# Patient Record
Sex: FEMALE | Race: BLACK OR AFRICAN AMERICAN | NOT HISPANIC OR LATINO | Employment: OTHER | URBAN - METROPOLITAN AREA
[De-identification: names, ages, dates, MRNs, and addresses within clinical notes are randomized per-mention and may not be internally consistent; named-entity substitution may affect disease eponyms.]

---

## 2017-03-22 ENCOUNTER — TELEPHONE (OUTPATIENT)
Dept: FAMILY MEDICINE | Facility: CLINIC | Age: 62
End: 2017-03-22

## 2017-03-22 ENCOUNTER — OFFICE VISIT (OUTPATIENT)
Dept: FAMILY MEDICINE | Facility: CLINIC | Age: 62
End: 2017-03-22
Payer: COMMERCIAL

## 2017-03-22 VITALS
TEMPERATURE: 97.7 F | SYSTOLIC BLOOD PRESSURE: 157 MMHG | WEIGHT: 209 LBS | HEART RATE: 90 BPM | DIASTOLIC BLOOD PRESSURE: 82 MMHG | HEIGHT: 60 IN | BODY MASS INDEX: 41.03 KG/M2

## 2017-03-22 DIAGNOSIS — Z12.11 SCREEN FOR COLON CANCER: ICD-10-CM

## 2017-03-22 DIAGNOSIS — Z12.31 ENCOUNTER FOR SCREENING MAMMOGRAM FOR BREAST CANCER: ICD-10-CM

## 2017-03-22 DIAGNOSIS — Z13.220 SCREENING CHOLESTEROL LEVEL: ICD-10-CM

## 2017-03-22 DIAGNOSIS — I10 HYPERTENSION GOAL BP (BLOOD PRESSURE) < 140/90: ICD-10-CM

## 2017-03-22 DIAGNOSIS — Z11.59 NEED FOR HEPATITIS C SCREENING TEST: ICD-10-CM

## 2017-03-22 DIAGNOSIS — Z23 NEED FOR DIPHTHERIA-TETANUS-PERTUSSIS (TDAP) VACCINE, ADULT/ADOLESCENT: ICD-10-CM

## 2017-03-22 DIAGNOSIS — Z00.00 ROUTINE GENERAL MEDICAL EXAMINATION AT A HEALTH CARE FACILITY: Primary | ICD-10-CM

## 2017-03-22 DIAGNOSIS — E66.01 MORBID OBESITY, UNSPECIFIED OBESITY TYPE (H): ICD-10-CM

## 2017-03-22 DIAGNOSIS — Z12.4 SCREENING FOR MALIGNANT NEOPLASM OF CERVIX: ICD-10-CM

## 2017-03-22 LAB
ANION GAP SERPL CALCULATED.3IONS-SCNC: 10 MMOL/L (ref 3–14)
BUN SERPL-MCNC: 13 MG/DL (ref 7–30)
CALCIUM SERPL-MCNC: 9.9 MG/DL (ref 8.5–10.1)
CHLORIDE SERPL-SCNC: 96 MMOL/L (ref 94–109)
CHOLEST SERPL-MCNC: 232 MG/DL
CO2 SERPL-SCNC: 33 MMOL/L (ref 20–32)
CREAT SERPL-MCNC: 0.83 MG/DL (ref 0.52–1.04)
GFR SERPL CREATININE-BSD FRML MDRD: 70 ML/MIN/1.7M2
GLUCOSE SERPL-MCNC: 104 MG/DL (ref 70–99)
HDLC SERPL-MCNC: 75 MG/DL
LDLC SERPL CALC-MCNC: 136 MG/DL
NONHDLC SERPL-MCNC: 157 MG/DL
POTASSIUM SERPL-SCNC: 3.3 MMOL/L (ref 3.4–5.3)
SODIUM SERPL-SCNC: 139 MMOL/L (ref 133–144)
TRIGL SERPL-MCNC: 105 MG/DL

## 2017-03-22 PROCEDURE — 36415 COLL VENOUS BLD VENIPUNCTURE: CPT | Performed by: FAMILY MEDICINE

## 2017-03-22 PROCEDURE — G0145 SCR C/V CYTO,THINLAYER,RESCR: HCPCS | Performed by: FAMILY MEDICINE

## 2017-03-22 PROCEDURE — 87624 HPV HI-RISK TYP POOLED RSLT: CPT | Performed by: FAMILY MEDICINE

## 2017-03-22 PROCEDURE — 99386 PREV VISIT NEW AGE 40-64: CPT | Mod: 25 | Performed by: FAMILY MEDICINE

## 2017-03-22 PROCEDURE — 90715 TDAP VACCINE 7 YRS/> IM: CPT | Performed by: FAMILY MEDICINE

## 2017-03-22 PROCEDURE — 80048 BASIC METABOLIC PNL TOTAL CA: CPT | Performed by: FAMILY MEDICINE

## 2017-03-22 PROCEDURE — 80061 LIPID PANEL: CPT | Performed by: FAMILY MEDICINE

## 2017-03-22 PROCEDURE — 90471 IMMUNIZATION ADMIN: CPT | Performed by: FAMILY MEDICINE

## 2017-03-22 NOTE — NURSING NOTE
Screening Questionnaire for Adult Immunization    Are you sick today?   No   Do you have allergies to medications, food, a vaccine component or latex?   No   Have you ever had a serious reaction after receiving a vaccination?   No   Do you have a long-term health problem with heart disease, lung disease, asthma, kidney disease, metabolic disease (e.g. diabetes), anemia, or other blood disorder?   No   Do you have cancer, leukemia, HIV/AIDS, or any other immune system problem?   No   In the past 3 months, have you taken medications that affect  your immune system, such as prednisone, other steroids, or anticancer drugs; drugs for the treatment of rheumatoid arthritis, Crohn s disease, or psoriasis; or have you had radiation treatments?   No   Have you had a seizure, or a brain or other nervous system problem?   No   During the past year, have you received a transfusion of blood or blood     products, or been given immune (gamma) globulin or antiviral drug?   No   For women: Are you pregnant or is there a chance you could become        pregnant during the next month?   No   Have you received any vaccinations in the past 4 weeks?   No     Immunization questionnaire answers were all negative.      MNVFC doesn't apply on this patient           Screening performed by Monika Beach on 3/22/2017 at 2:08 PM.

## 2017-03-22 NOTE — MR AVS SNAPSHOT
After Visit Summary   3/22/2017    Melissa Coronado    MRN: [id number]           Patient Information     Date Of Birth          1955        Visit Information        Provider Department      3/22/2017 9:20 AM Yodit Pickering MD [facility]        Today's Diagnoses     Routine general medical examination at a health care facility    -  1    Morbid obesity, unspecified obesity type (H)        Hypertension goal BP (blood pressure) < 140/90        Screening cholesterol level        Need for hepatitis C screening test        Encounter for screening mammogram for breast cancer        Screen for colon cancer        Screening for malignant neoplasm of cervix        Need for diphtheria-tetanus-pertussis (Tdap) vaccine, adult/adolescent          Care Instructions      Preventive Health Recommendations  Female Ages 50 - 64    Yearly exam: See your health care provider every year in order to  o Review health changes.   o Discuss preventive care.    o Review your medicines if your doctor has prescribed any.      Get a Pap test every three years (unless you have an abnormal result and your provider advises testing more often).    If you get Pap tests with HPV test, you only need to test every 5 years, unless you have an abnormal result.     You do not need a Pap test if your uterus was removed (hysterectomy) and you have not had cancer.    You should be tested each year for STDs (sexually transmitted diseases) if you're at risk.     Have a mammogram every 1 to 2 years.    Have a colonoscopy at age 50, or have a yearly FIT test (stool test). These exams screen for colon cancer.      Have a cholesterol test every 5 years, or more often if advised.    Have a diabetes test (fasting glucose) every three years. If you are at risk for diabetes, you should have this test more often.     If you are at risk for osteoporosis (brittle bone disease), think about having a bone density scan (DEXA).    Shots: Get a flu  shot each year. Get a tetanus shot every 10 years.    Nutrition:     Eat at least 5 servings of fruits and vegetables each day.    Eat whole-grain bread, whole-wheat pasta and brown rice instead of white grains and rice.    Talk to your provider about Calcium and Vitamin D.     Lifestyle    Exercise at least 150 minutes a week (30 minutes a day, 5 days a week). This will help you control your weight and prevent disease.    Limit alcohol to one drink per day.    No smoking.     Wear sunscreen to prevent skin cancer.     See your dentist every six months for an exam and cleaning.    See your eye doctor every 1 to 2 years.          Follow-ups after your visit        Your next 10 appointments already scheduled     Mar 30, 2017  3:20 PM CDT   Office Visit with Yodit Pickering MD   [facility] ([facility]    [address] [facility] [address] [phone]           Bring a current list of meds and any records pertaining to this visit.  For Physicals, please bring immunization records and any forms needing to be filled out.  Please arrive 10 minutes early to complete paperwork.              Future tests that were ordered for you today     Open Future Orders        Priority Expected Expires Ordered    Fecal colorectal cancer screen (FIT) Routine 4/12/2017 6/14/2017 3/22/2017    MA Screening Digital Bilateral Routine  3/22/2018 3/22/2017    **Hepatitis C Screen Reflex to RNA FUTURE anytime Routine 3/22/2017 3/22/2018 3/22/2017            Who to contact     If you have questions or need follow up information about today's clinic visit or your schedule please contact [facility] directly at [phone].  Normal or non-critical lab and imaging results will be communicated to you by MyChart, letter or phone within 4 business days after the clinic has received the results. If you do not hear from us within 7 days, please contact the clinic through MyChart or phone. If you have a  "critical or abnormal lab result, we will notify you by phone as soon as possible.  Submit refill requests through [facility] or call your pharmacy and they will forward the refill request to us. Please allow 3 business days for your refill to be completed.          Additional Information About Your Visit        [employer] Information     [facility] lets you send messages to your doctor, view your test results, renew your prescriptions, schedule appointments and more. To sign up, go to [email or web address] . Click on \"Log in\" on the left side of the screen, which will take you to the Welcome page. Then click on \"Sign up Now\" on the right side of the page.     You will be asked to enter the access code listed below, as well as some personal information. Please follow the directions to create your username and password.     Your access code is: [email or web address]  Expires: 2017  7:50 AM     Your access code will  in 90 days. If you need help or a new code, please call your [facility] clinic or [phone].        Care EveryWhere ID     This is your Care EveryWhere ID. This could be used by other organizations to access your [facility] medical records  [id number]        Your Vitals Were     Pulse Temperature Height BMI (Body Mass Index)          90 97.7  F (36.5  C) (Oral) 5' (1.524 m) 40.82 kg/m2         Blood Pressure from Last 3 Encounters:   17 157/82   16 174/85    Weight from Last 3 Encounters:   17 209 lb (94.8 kg)   16 196 lb (88.9 kg)              We Performed the Following     Basic metabolic panel     HPV High Risk Types DNA Cervical     Lipid panel reflex to direct LDL     Pap imaged thin layer screen with HPV - recommended age 30 - 65 years (select HPV order below)     TDAP (ADACEL AGES 11-64)        [facility] Care [facility] Office Phone #    [phone]       No address on file        Thank you!     Thank you for choosing [facility]  for your care. " Our goal is always to provide you with excellent care. Hearing back from our patients is one way we can continue to improve our services. Please take a few minutes to complete the written survey that you may receive in the mail after your visit with us. Thank you!             Your Updated Medication List - Protect others around you: Learn how to safely use, store and throw away your medicines at www.disposemymeds.org.          This list is accurate as of: 3/22/17  3:27 PM.  Always use your most recent med list.                   Brand Name Dispense Instructions for use    HYDROCHLOROTHIAZIDE PO          POTASSIUM CHLORIDE PO          TYLENOL EXTRA STRENGTH PO      Take 500 mg by mouth

## 2017-03-22 NOTE — LETTER
March 30, 2017    Melissa Ivonne  [address]   [place] [address]    Dear Melissa,  We are happy to inform you that your PAP smear result from 3/22/17 is normal.  We are now able to do a follow up test on PAP smears. The DNA test is for HPV (Human Papilloma Virus). Cervical cancer is closely linked with certain types of HPV. Your result showed no evidence of high risk HPV.  Therefore we recommend you return in 5 years for your next pap smear and HPV test.  You will still need to return to the clinic every year for an annual exam and other preventive tests.  Please contact the clinic at [phone] with any questions.  Sincerely,    Yodit Moralez MD/jacky

## 2017-03-22 NOTE — PROGRESS NOTES
SUBJECTIVE:     CC: Melissa Coronado is an 62 year old woman who presents for preventive health visit.     Healthy Habits:    Do you get at least three servings of calcium containing foods daily (dairy, green leafy vegetables, etc.)? No     Amount of exercise or daily activities, outside of work: 0 day(s) per week    Problems taking medications regularly No    Medication side effects: No    Have you had an eye exam in the past two years? yes    Do you see a dentist twice per year? no    Do you have sleep apnea, excessive snoring or daytime drowsiness?no    [place] Originally [place] [facility].  No concerns today except back pain.   She made fu appt to address back pain        Today's PHQ-2 Score: No flowsheet data found.    Abuse: Current or Past(Physical, Sexual or Emotional)- No  Do you feel safe in your environment - Yes    Social History   Substance Use Topics     Smoking status: Never Smoker     Smokeless tobacco: Not on file     Alcohol use Not on file     The patient does not drink >3 drinks per day nor >7 drinks per week.    No results for input(s): CHOL, HDL, LDL, TRIG, CHOLHDLRATIO, NHDL in the last 38647 hours.    Reviewed orders with patient.  Reviewed health maintenance and updated orders accordingly - Yes    Mammo Decision Support:  Patient over age 50, mutual decision to screen reflected in health maintenance.    Pertinent mammograms are reviewed under the imaging tab.  History of abnormal Pap smear: NO - age 30-65 PAP every 5 years with negative HPV co-testing recommended    Reviewed and updated as needed this visit by clinical staff  Tobacco  Allergies  Med Hx  Surg Hx  Fam Hx  Soc Hx        Reviewed and updated as needed this visit by Provider            ROS:  C: NEGATIVE for fever, chills, change in weight  I: NEGATIVE for worrisome rashes, moles or lesions  E: NEGATIVE for vision changes or irritation  ENT: NEGATIVE for ear, mouth and throat problems  R: NEGATIVE for  significant cough or SOB  B: NEGATIVE for masses, tenderness or discharge  CV: NEGATIVE for chest pain, palpitations or peripheral edema  GI: NEGATIVE for nausea, abdominal pain, heartburn, or change in bowel habits  : NEGATIVE for unusual urinary or vaginal symptoms. No vaginal bleeding.  M: NEGATIVE for significant arthralgias or myalgia  N: NEGATIVE for weakness, dizziness or paresthesias  P: NEGATIVE for changes in mood or affect     Problem list, Medication list, Allergies, and Medical/Social/Surgical histories reviewed in [facility] and updated as appropriate.  OBJECTIVE:     /82  Pulse 90  Temp 97.7  F (36.5  C) (Oral)  Ht 5' (1.524 m)  Wt 209 lb (94.8 kg)  BMI 40.82 kg/m2  EXAM:  GENERAL APPEARANCE: healthy, alert and no distress  EYES: Eyes grossly normal to inspection, PERRL and conjunctivae and sclerae normal  HENT: ear canals and TM's normal, nose and mouth without ulcers or lesions, oropharynx clear and oral mucous membranes moist  NECK: no adenopathy, no asymmetry, masses, or scars and thyroid normal to palpation  RESP: lungs clear to auscultation - no rales, rhonchi or wheezes  BREAST: normal without masses, tenderness or nipple discharge and no palpable axillary masses or adenopathy  CV: regular rate and rhythm, normal S1 S2, no S3 or S4, no murmur, click or rub, no peripheral edema and peripheral pulses strong  ABDOMEN: soft, nontender, no hepatosplenomegaly, no masses and bowel sounds normal   (female): normal female external genitalia, normal urethral meatus, vaginal mucosal atrophy noted, normal cervix, adnexae, and uterus without masses or abnormal discharge  MS: no musculoskeletal defects are noted and gait is age appropriate without ataxia  SKIN: no suspicious lesions or rashes  NEURO: Normal strength and tone, sensory exam grossly normal, mentation intact and speech normal  PSYCH: mentation appears normal and affect normal/bright    ASSESSMENT/PLAN:     1. Routine general medical  examination at a health care facility      2. Hypertension goal BP (blood pressure) < 140/90  Did not take her meds this morning, has fu appt. Will recheck at that time  - Basic metabolic panel    3. Screening cholesterol level    - Lipid panel reflex to direct LDL    4. Need for hepatitis C screening test    - **Hepatitis C Screen Reflex to RNA FUTURE anytime; Future    5. Encounter for screening mammogram for breast cancer    - MA Screening Digital Bilateral; Future    6. Screen for colon cancer    - Fecal colorectal cancer screen (FIT); Future    7. Screening for malignant neoplasm of cervix    - Pap imaged thin layer screen with HPV - recommended age 30 - 65 years (select HPV order below)  - HPV High Risk Types DNA Cervical    8. Need for diphtheria-tetanus-pertussis (Tdap) vaccine, adult/adolescent    - TDAP (ADACEL AGES 11-64)    COUNSELING:   Reviewed preventive health counseling, as reflected in patient instructions       Regular exercise       Healthy diet/nutrition       Vision screening       Osteoporosis Prevention/Bone Health         reports that she has never smoked. She does not have any smokeless tobacco history on file.    Estimated body mass index is 40.82 kg/(m^2) as calculated from the following:    Height as of this encounter: 5' (1.524 m).    Weight as of this encounter: 209 lb (94.8 kg).   Weight management plan: Discussed healthy diet and exercise guidelines and patient will follow up in 12 months in clinic to re-evaluate.    Counseling Resources:  ATP IV Guidelines  Pooled Cohorts Equation Calculator  Breast Cancer Risk Calculator  FRAX Risk Assessment  ICSI Preventive Guidelines  Dietary Guidelines for Americans, 2010  USDA's MyPlate  ASA Prophylaxis  Lung CA Screening    Yodit Moralez MD  [facility]

## 2017-03-22 NOTE — NURSING NOTE
Chief Complaint   Patient presents with     Physical       Initial /82  Pulse 90  Temp 97.7  F (36.5  C) (Oral)  Ht 5' (1.524 m)  Wt 209 lb (94.8 kg)  BMI 40.82 kg/m2 Estimated body mass index is 40.82 kg/(m^2) as calculated from the following:    Height as of this encounter: 5' (1.524 m).    Weight as of this encounter: 209 lb (94.8 kg).  Medication Reconciliation: complete     Monika Beach MA

## 2017-03-24 LAB
COPATH REPORT: NORMAL
PAP: NORMAL

## 2017-03-28 LAB
FINAL DIAGNOSIS: NORMAL
HPV HR 12 DNA CVX QL NAA+PROBE: NEGATIVE
HPV16 DNA SPEC QL NAA+PROBE: NEGATIVE
HPV18 DNA SPEC QL NAA+PROBE: NEGATIVE
SPECIMEN DESCRIPTION: NORMAL

## 2017-03-30 DIAGNOSIS — Z11.59 NEED FOR HEPATITIS C SCREENING TEST: ICD-10-CM

## 2017-03-30 DIAGNOSIS — Z12.11 SCREEN FOR COLON CANCER: ICD-10-CM

## 2017-03-30 PROCEDURE — 82274 ASSAY TEST FOR BLOOD FECAL: CPT | Performed by: FAMILY MEDICINE

## 2017-03-30 PROCEDURE — 86803 HEPATITIS C AB TEST: CPT | Performed by: FAMILY MEDICINE

## 2017-03-30 PROCEDURE — 36415 COLL VENOUS BLD VENIPUNCTURE: CPT | Performed by: FAMILY MEDICINE

## 2017-03-30 NOTE — LETTER
[facility]  [address] [facility] [address] [phone]        April 3, 2017    Melissa Coronado  [address]  [place] [address]            Dear Melissa,    Your screening test for colon cancer is negative. Repeat this test yearly. Below is a copy of the results.  It was a pleasure to see you at your last appointment.    If you have any questions or concerns, please call myself or my nurse at [phone].    Sincerely,    Yodit Moralez MD    Results for orders placed or performed in visit on 03/30/17   **Hepatitis C Screen Reflex to RNA FUTURE anytime   Result Value Ref Range    Hepatitis C Antibody  NR     Nonreactive   Assay performance characteristics have not been established for newborns,   infants, and children     Fecal colorectal cancer screen (FIT)   Result Value Ref Range    Occult Blood Scn FIT Negative NEG

## 2017-03-30 NOTE — LETTER
[facility]  [address] [facility] [address]  [phone]        March 31, 2017    Melissa Coronado  [address]  [place] [address]            Dear Melissa,    Your screening test for hepatitis C is negative.     Yodit Moralez MD/naty    Results for orders placed or performed in visit on 03/30/17   **Hepatitis C Screen Reflex to RNA FUTURE anytime   Result Value Ref Range    Hepatitis C Antibody  NR     Nonreactive   Assay performance characteristics have not been established for newborns,   infants, and children

## 2017-03-31 LAB
HCV AB SERPL QL IA: NORMAL
HEMOCCULT STL QL IA: NEGATIVE

## 2017-04-13 ENCOUNTER — RADIANT APPOINTMENT (OUTPATIENT)
Dept: MAMMOGRAPHY | Facility: CLINIC | Age: 62
End: 2017-04-13
Attending: FAMILY MEDICINE
Payer: COMMERCIAL

## 2017-04-13 ENCOUNTER — OFFICE VISIT (OUTPATIENT)
Dept: FAMILY MEDICINE | Facility: CLINIC | Age: 62
End: 2017-04-13
Payer: COMMERCIAL

## 2017-04-13 ENCOUNTER — RADIANT APPOINTMENT (OUTPATIENT)
Dept: GENERAL RADIOLOGY | Facility: CLINIC | Age: 62
End: 2017-04-13
Attending: FAMILY MEDICINE
Payer: COMMERCIAL

## 2017-04-13 VITALS
BODY MASS INDEX: 41.01 KG/M2 | WEIGHT: 210 LBS | TEMPERATURE: 98 F | OXYGEN SATURATION: 97 % | HEART RATE: 84 BPM | RESPIRATION RATE: 15 BRPM | SYSTOLIC BLOOD PRESSURE: 147 MMHG | DIASTOLIC BLOOD PRESSURE: 63 MMHG

## 2017-04-13 DIAGNOSIS — M25.562 CHRONIC PAIN OF BOTH KNEES: ICD-10-CM

## 2017-04-13 DIAGNOSIS — G89.29 CHRONIC PAIN OF BOTH KNEES: ICD-10-CM

## 2017-04-13 DIAGNOSIS — M54.50 MIDLINE LOW BACK PAIN WITHOUT SCIATICA, UNSPECIFIED CHRONICITY: Primary | ICD-10-CM

## 2017-04-13 DIAGNOSIS — Z12.31 ENCOUNTER FOR SCREENING MAMMOGRAM FOR BREAST CANCER: ICD-10-CM

## 2017-04-13 DIAGNOSIS — M25.561 CHRONIC PAIN OF BOTH KNEES: ICD-10-CM

## 2017-04-13 PROCEDURE — 99214 OFFICE O/P EST MOD 30 MIN: CPT | Performed by: FAMILY MEDICINE

## 2017-04-13 PROCEDURE — G0202 SCR MAMMO BI INCL CAD: HCPCS | Mod: TC

## 2017-04-13 PROCEDURE — 72100 X-RAY EXAM L-S SPINE 2/3 VWS: CPT

## 2017-04-13 RX ORDER — NAPROXEN SODIUM 220 MG
TABLET ORAL 2 TIMES DAILY WITH MEALS
COMMUNITY
End: 2018-08-03

## 2017-04-13 ASSESSMENT — PAIN SCALES - GENERAL: PAINLEVEL: SEVERE PAIN (6)

## 2017-04-13 NOTE — PATIENT INSTRUCTIONS
"1. Refer to physical therapy  2. Follow up with orthopedics for knee pain in 2 weeks  3. Follow up with your primary care provider for follow up back pain in 2-4 weeks    Neck/Back Pain: General    Both neck and back pain are usually caused by injury to the muscles or ligaments of the spine. Sometimes the disks that separate each bone of the spine may cause pain by pressing on a nearby nerve. Back and neck pain may appear after a sudden twisting/bending force (such as in a car accident), or sometimes after a simple awkward movement. In either case, muscle spasm is often present and adds to the pain.  Acute neck and back pain usually gets better in 1 to 2 weeks. Pain related to disk disease, arthritis in the spinal joints or spinal stenosis (narrowing of the spinal canal) can become chronic and last for months or years.  Back and neck pain are common problems. Most people feel better in 1 or 2 weeks, and most of the rest in 1 to 2 months. Most people can remain active.  Symptoms  People experience and describe pain differently.    Pain can be sharp, stabbing, shooting, aching, cramping, or burning    Movement, standing, bending, lifting, sitting, or walking may worsen the pain    Pain can be localized to one spot or area, or it can be more generalized    Pain can spread or radiate upwards, downwards, to the front, or go down your arms    Muscle spasm may occur.  Cause  Most of the time \"mechanical problems\" with the muscles or spine cause the pain. it is usually caused by an injury, whether known or not, to the muscles or ligaments. While illnesses can cause back pain, it is usually not caused by a serious illness. Pain is usually related to physical activity, whether sports, exercise, work, or normal activity. Sometimes it can occur without an identifiable cause. This can happen simply by stretching or moving wrong, without noting pain at the time. Other causes include:    Overexertion, lifting, pushing, pulling " incorrectly or too aggressively.    Sudden twisting, bending or stretching from an accident (car or fall), or accidental movement.    Poor posture    Poor conditioning, lack of regular exercise    Spinal disc disease or arthritis    Stress    Pregnancy, or illness like appendicitis, bladder or kidney infection, pelvic infections   Home care    For neck pain: Use a comfortable pillow that supports the head and keeps the spine in a neutral position. The position of the head should not be tilted forward or backward.    When in bed, try to find a position of comfort. A firm mattress is best. Try lying flat on your back with pillows under your knees. You can also try lying on your side with your knees bent up towards your chest and a pillow between your knees.    At first, do not try to stretch out the sore spots. If there is a strain, it is not like the good soreness you get after exercising without an injury. In this case, stretching may make it worse.    Avoid prolonged sitting, long car rides or travel. This puts more stress on the lower back than standing or walking.    During the first 24 to 72 hours after an injury, apply an ice pack to the painful area for 20 minutes and then remove it for 20 minutes over a period of 60 to 90 minutes or several times a day. As a safety precaution, do not use a heating pad at bedtime. Sleeping with a heating pad can lead to skin burns or tissue damage.    Ice and heat therapies can be alternated. Talk with your health care provider about the best treatment for your back or neck pain.    Therapeutic massage can help relax the back and neck muscles without stretching them.    Be aware of safe lifting methods and do not lift anything over 15 pounds until all the pain is gone.  Medications  Talk to your health care provider before using medications, especially if you have other medical problems or are taking other medicines.    You may use acetaminophen (such as Tylenol) or ibuprofen  (such as Advil or Motrin) to control pain, unless another pain medicine was prescribed. If you have chronic conditions like diabetes, liver or kidney disease, stomach ulcers,  gastrointestinal bleeding, or are taking blood thinner medications.    Be careful if you are given pain medicines, narcotics, or medication for muscle spasm. They can cause drowsiness, and can affect your coordination, reflexes, and judgment. Do not drive or operate heavy machinery.  Follow-up care  Follow up with your health care provider if your symptoms do not start to improve after one week. Physical therapy or further tests may be needed.  If X-rays were taken, they will be reviewed by a radiologist. You will be notified of any new findings that may affect your care.  Call 911  Seek emergency medical care if any of the following occur:    Trouble breathing    Confusion    Very drowsy or trouble awakening    Fainting or loss of consciousness    Rapid or very slow heart rate    Loss of bowel or bladder control  When to seek medical care  Get prompt medical attention if any of the following occur:    Pain becomes worse or spreads into your arms or legs    Weakness, numbness or pain in one or both arms or legs    Numbness in the groin area    Difficulty walking    Fever of 100.4 F (38 C) or higher, or as directed by your healthcare provider    [id number] [employer]. [address] [place] [address], [place] [address]. All rights reserved. This information is not intended as a substitute for professional medical care. Always follow your healthcare professional's instructions.

## 2017-04-13 NOTE — MR AVS SNAPSHOT
"              After Visit Summary   4/13/2017    Melissa Coronado    MRN: [id number]           Patient Information     Date Of Birth          1955        Visit Information        Provider Department      4/13/2017 11:40 Adriana Birmingham MD [facility]        Today's Diagnoses     Midline low back pain without sciatica, unspecified chronicity    -  1    Chronic pain of both knees          Care Instructions    1. Refer to physical therapy  2. Follow up with orthopedics for knee pain in 2 weeks  3. Follow up with your primary care provider for follow up back pain in 2-4 weeks    Neck/Back Pain: General    Both neck and back pain are usually caused by injury to the muscles or ligaments of the spine. Sometimes the disks that separate each bone of the spine may cause pain by pressing on a nearby nerve. Back and neck pain may appear after a sudden twisting/bending force (such as in a car accident), or sometimes after a simple awkward movement. In either case, muscle spasm is often present and adds to the pain.  Acute neck and back pain usually gets better in 1 to 2 weeks. Pain related to disk disease, arthritis in the spinal joints or spinal stenosis (narrowing of the spinal canal) can become chronic and last for months or years.  Back and neck pain are common problems. Most people feel better in 1 or 2 weeks, and most of the rest in 1 to 2 months. Most people can remain active.  Symptoms  People experience and describe pain differently.    Pain can be sharp, stabbing, shooting, aching, cramping, or burning    Movement, standing, bending, lifting, sitting, or walking may worsen the pain    Pain can be localized to one spot or area, or it can be more generalized    Pain can spread or radiate upwards, downwards, to the front, or go down your arms    Muscle spasm may occur.  Cause  Most of the time \"mechanical problems\" with the muscles or spine cause the pain. it is usually caused by an injury, " whether known or not, to the muscles or ligaments. While illnesses can cause back pain, it is usually not caused by a serious illness. Pain is usually related to physical activity, whether sports, exercise, work, or normal activity. Sometimes it can occur without an identifiable cause. This can happen simply by stretching or moving wrong, without noting pain at the time. Other causes include:    Overexertion, lifting, pushing, pulling incorrectly or too aggressively.    Sudden twisting, bending or stretching from an accident (car or fall), or accidental movement.    Poor posture    Poor conditioning, lack of regular exercise    Spinal disc disease or arthritis    Stress    Pregnancy, or illness like appendicitis, bladder or kidney infection, pelvic infections   Home care    For neck pain: Use a comfortable pillow that supports the head and keeps the spine in a neutral position. The position of the head should not be tilted forward or backward.    When in bed, try to find a position of comfort. A firm mattress is best. Try lying flat on your back with pillows under your knees. You can also try lying on your side with your knees bent up towards your chest and a pillow between your knees.    At first, do not try to stretch out the sore spots. If there is a strain, it is not like the good soreness you get after exercising without an injury. In this case, stretching may make it worse.    Avoid prolonged sitting, long car rides or travel. This puts more stress on the lower back than standing or walking.    During the first 24 to 72 hours after an injury, apply an ice pack to the painful area for 20 minutes and then remove it for 20 minutes over a period of 60 to 90 minutes or several times a day. As a safety precaution, do not use a heating pad at bedtime. Sleeping with a heating pad can lead to skin burns or tissue damage.    Ice and heat therapies can be alternated. Talk with your health care provider about the best  treatment for your back or neck pain.    Therapeutic massage can help relax the back and neck muscles without stretching them.    Be aware of safe lifting methods and do not lift anything over 15 pounds until all the pain is gone.  Medications  Talk to your health care provider before using medications, especially if you have other medical problems or are taking other medicines.    You may use acetaminophen (such as Tylenol) or ibuprofen (such as Advil or Motrin) to control pain, unless another pain medicine was prescribed. If you have chronic conditions like diabetes, liver or kidney disease, stomach ulcers,  gastrointestinal bleeding, or are taking blood thinner medications.    Be careful if you are given pain medicines, narcotics, or medication for muscle spasm. They can cause drowsiness, and can affect your coordination, reflexes, and judgment. Do not drive or operate heavy machinery.  Follow-up care  Follow up with your health care provider if your symptoms do not start to improve after one week. Physical therapy or further tests may be needed.  If X-rays were taken, they will be reviewed by a radiologist. You will be notified of any new findings that may affect your care.  Call 911  Seek emergency medical care if any of the following occur:    Trouble breathing    Confusion    Very drowsy or trouble awakening    Fainting or loss of consciousness    Rapid or very slow heart rate    Loss of bowel or bladder control  When to seek medical care  Get prompt medical attention if any of the following occur:    Pain becomes worse or spreads into your arms or legs    Weakness, numbness or pain in one or both arms or legs    Numbness in the groin area    Difficulty walking    Fever of 100.4 F (38 C) or higher, or as directed by your healthcare provider    0467-1653 [employer]. [address] [place] [address], [place] [address]. All rights reserved. This information is not intended as a substitute for professional  medical care. Always follow your healthcare professional's instructions.              Follow-ups after your visit        Additional Services     [facility] PT, HAND, AND CHIROPRACTIC REFERRAL       **This order will print in the [facility] Scheduling Office**    Physical Therapy, Hand Therapy and Chiropractic Care are available through:    *[facility]  *[facility]  *[facility]    Call one number to schedule at any of the above locations: [phone].    Your provider has referred you to: Physical Therapy at [facility] or [facility]    Indication/Reason for Referral: Low Back Pain  Onset of Illness: on and off for past few years, worse past 2 months  Therapy Orders: Evaluate and Treat  Special Programs: None  Special Request: care for knee use. Patient also has history of recurrent knee problems.  Esau Cifuentes      Additional Comments for the Therapist or Chiropractor:none    Please be aware that coverage of these services is subject to the terms and limitations of your health insurance plan.  Call member services at your health plan with any benefit or coverage questions.      Please bring the following to your appointment:    *Your personal calendar for scheduling future appointments  *Comfortable clothing            ORTHO  REFERRAL       [facility] is referring you to the Orthopedic  Services at [facility].       The  Representative will assist you in the coordination of your Orthopedic and Musculoskeletal Care as prescribed by your physician.    The  Representative will call you within 1 business day to help schedule your appointment, or you may contact the  Representative at:    All areas ~ [phone]     Type of Referral : Non Surgical       Timeframe requested: Routine    Coverage of these services is subject to the terms and limitations of your health insurance plan.  Please call member  "services at your health plan with any benefit or coverage questions.      If X-rays, CT or MRI's have been performed, please contact the facility where they were done to arrange for , prior to your scheduled appointment.  Please bring this referral request to your appointment and present it to your specialist.                  Who to contact     If you have questions or need follow up information about today's clinic visit or your schedule please contact [facility] directly at [phone].  Normal or non-critical lab and imaging results will be communicated to you by MyChart, letter or phone within 4 business days after the clinic has received the results. If you do not hear from us within 7 days, please contact the clinic through [employer] or phone. If you have a critical or abnormal lab result, we will notify you by phone as soon as possible.  Submit refill requests through [employer] or call your pharmacy and they will forward the refill request to us. Please allow 3 business days for your refill to be completed.          Additional Information About Your Visit        [employer] Information     [employer] lets you send messages to your doctor, view your test results, renew your prescriptions, schedule appointments and more. To sign up, go to [email or web address] . Click on \"Log in\" on the left side of the screen, which will take you to the Welcome page. Then click on \"Sign up Now\" on the right side of the page.     You will be asked to enter the access code listed below, as well as some personal information. Please follow the directions to create your username and password.     Your access code is: [email or web address]  Expires: 2017  7:50 AM     Your access code will  in 90 days. If you need help or a new code, please call your [facility] or [phone].        Care EveryWhere ID     This is your Care EveryWhere ID. This could be used by other organizations to access your [place] medical " records  [id number]        Your Vitals Were     Pulse Temperature Respirations Pulse Oximetry Breastfeeding? BMI (Body Mass Index)    84 98  F (36.7  C) (Oral) 15 97% No 41.01 kg/m2       Blood Pressure from Last 3 Encounters:   04/13/17 147/63   03/22/17 157/82   06/03/16 174/85    Weight from Last 3 Encounters:   04/13/17 210 lb (95.3 kg)   03/22/17 209 lb (94.8 kg)   06/03/16 196 lb (88.9 kg)              We Performed the Following     [facility] PT, HAND, AND CHIROPRACTIC REFERRAL     ORTHO  REFERRAL     XR Lumbar Spine 2/3 Views        Primary Care Provider Office Phone #    [facility] [phone]       No address on file        Thank you!     Thank you for choosing [facility]  for your care. Our goal is always to provide you with excellent care. Hearing back from our patients is one way we can continue to improve our services. Please take a few minutes to complete the written survey that you may receive in the mail after your visit with us. Thank you!             Your Updated Medication List - Protect others around you: Learn how to safely use, store and throw away your medicines at www.disposemymeds.org.          This list is accurate as of: 4/13/17 12:23 PM.  Always use your most recent med list.                   Brand Name Dispense Instructions for use    ALEVE 220 MG tablet   Generic drug:  naproxen sodium      Take by mouth 2 times daily (with meals)       HYDROCHLOROTHIAZIDE PO          POTASSIUM CHLORIDE PO          TYLENOL EXTRA STRENGTH PO      Take 500 mg by mouth Reported on 4/13/2017

## 2017-04-13 NOTE — PROGRESS NOTES
SUBJECTIVE:                                                    Melissa Coronado is a 62 year old female who presents to clinic today for the following health issues:      Back Pain      Duration: about 2 mmonths        Specific cause: none    Description:   Location of pain: low back bilateral and upper back bilateral  Character of pain: sharp and intermittent   Pain radiation:none  New numbness or weakness in legs, not attributed to pain:  no     Intensity: , severe    History:   Pain interferes with job: not currently employed.  History of back problems: no prior back problems  Any previous MRI or X-rays: None  Sees a specialist for back pain:  No  Therapies tried without relief: stretching exercises    Alleviating factors:   Improved by: aleve      Precipitating factors:  Worsened by: Bending, Standing and Sitting     Accompanying Signs & Symptoms:  Risk of Fracture:  None  Risk of Cauda Equina:  None  Risk of Infection:  None  Risk of Cancer:  None                 Lumbar back pain   Also occasional left upper back pain  Has chronic bilateral knee pain    Problem list, Medication list, Allergies, and Medical/Social/Surgical histories reviewed in [facility] and updated as appropriate.        REVIEW OF SYSTEMS  General: negative for fever, constitutional symptoms or weight loss  Resp: negative for chest pain or shortness of breath  CV: negative for chest pain  : negative for dysuria , incontinence, frequency  Musculoskeletal: as above  Neurologic: negative for ataxia, saddle anesthesia, fecal or urinary incontinence, one sided weakness,  Paresthesias  Constitutional, HEENT, cardiovascular, pulmonary, gi and gu systems are negative, except as otherwise noted.    Physical Exam:  Vitals: /63  Pulse 84  Temp 98  F (36.7  C) (Oral)  Resp 15  Wt 210 lb (95.3 kg)  SpO2 97%  Breastfeeding? No  BMI 41.01 kg/m2  BMI= Body mass index is 41.01 kg/(m^2).  Constitutional: healthy, alert and no acute distress   Head:  atraumatic  CARDIO: regular in rate and rhythm no murmurs rubs or gallops  RESP: lungs clear to auscultation  ABDOMEN: soft nontender  NEURO: Patellar reflexes intact and equal b/l  BACK:  Patient obese. Straight leg raise intact, No spine tenderness  Not much paraspinal muscle tenderness to palpation, strength intact and equal b/l lower extremities. Sensory intact. Rectal exam declined/deferred.   GAIT: intact  Increased pain with range of motion of back. Patient stooped to walk.  Bilateral knee pain worse with range of motion and flexion.   Psychiatric: mentation appears normal and affect normal/bright    Diagnostic Test Results:  xrays show multiple levels of degeration LS spine but nothing acute to my review official pending.       Impression:    ICD-10-CM    1. Midline low back pain without sciatica, unspecified chronicity M54.5 XR Lumbar Spine 2/3 Views     [facility] PT, HAND, AND CHIROPRACTIC REFERRAL   2. Chronic pain of both knees M25.561 ORTHO  REFERRAL    M25.562     G89.29          Plan:  Instructions for back care and return precautions discussed.    Recommend PT  It appears that her knee pain is actually more debilitating than her back pain. Referred back to ortho. Ortho evaluated for same problem last year  Follow up with primary care provider for back pain.  Alarm signs or symptoms discussed, if present recommend go to ER   Discussed patient instructions in detail and recommended that she have her son read this to her as well to make sure she understood everything correctly.  activity modifications advised.  Over the counter medications discussed. Patient aware to avoid NSAIDS if with any kidney disease or ulcers. Proper dosing of over the counter medications likewise discussed.  Adverse reaction to medication discussed.  aware to come in right away if with any fever or chills, worsening symptoms, headache, bowel or bladder incontinence, motor or sensory deficits or gait disturbances.   follow-up  recommended.      Adriana Jones MD

## 2017-04-21 ENCOUNTER — OFFICE VISIT (OUTPATIENT)
Dept: ORTHOPEDICS | Facility: CLINIC | Age: 62
End: 2017-04-21
Payer: COMMERCIAL

## 2017-04-21 VITALS
HEIGHT: 60 IN | WEIGHT: 210 LBS | BODY MASS INDEX: 41.23 KG/M2 | SYSTOLIC BLOOD PRESSURE: 168 MMHG | DIASTOLIC BLOOD PRESSURE: 71 MMHG

## 2017-04-21 DIAGNOSIS — M25.562 ARTHRALGIA OF BOTH LOWER LEGS: ICD-10-CM

## 2017-04-21 DIAGNOSIS — M25.561 ARTHRALGIA OF BOTH LOWER LEGS: ICD-10-CM

## 2017-04-21 DIAGNOSIS — M17.0 PRIMARY OSTEOARTHRITIS OF KNEES, BILATERAL: Primary | ICD-10-CM

## 2017-04-21 PROCEDURE — 20611 DRAIN/INJ JOINT/BURSA W/US: CPT | Mod: 50 | Performed by: FAMILY MEDICINE

## 2017-04-21 PROCEDURE — 99244 OFF/OP CNSLTJ NEW/EST MOD 40: CPT | Mod: 25 | Performed by: FAMILY MEDICINE

## 2017-04-21 NOTE — NURSING NOTE
Chief Complaint   Patient presents with     Knee Pain     chronic bilateral knee pain > 1 year        Initial /71  Ht 5' (1.524 m)  Wt 210 lb (95.3 kg)  BMI 41.01 kg/m2 Estimated body mass index is 41.01 kg/(m^2) as calculated from the following:    Height as of this encounter: 5' (1.524 m).    Weight as of this encounter: 210 lb (95.3 kg).  Medication Reconciliation: complete     Derik Dye ATC

## 2017-04-21 NOTE — PROGRESS NOTES
Melissa Coronado  :  1955  DOS: 2017  MRN: [id number]    Sports Medicine Clinic Visit    PCP: Neeru Miranda    Melissa Coronado is a 62 year old female who is seen in consultation at the request of Adriana Jones MD with bilateral chronic knee pain.    Injury: Chronic bilateral knee pain over the last 1 year.  Pain located over bilateral deep medial, anterior knee, nonradiating.  Additional Features:  Positive: swelling and grinding.  Symptoms are better with Ibuprofen and Rest.  Symptoms are worse with: walking, going from sit to stand position, lifting, kneeling.  Other evaluation and/or treatments so far consists of: Ibuprofen, Rest and PCP consult, Ortho Surgeon consult (Dr Parson), steroid injection.  Recent imaging completed: X-rays completed 2016.  Prior History of related problems: Steroid injections completed by Dr Parson in 2016 provided good relief for ~ 2 weeks.    Social History: works as PCA    Review of Systems  Musculoskeletal: as above  Remainder of review of systems is negative including constitutional, CV, pulmonary, GI, Skin and Neurologic except as noted in HPI or medical history.    No past medical history on file.  No past surgical history on file.    Objective  /71  Ht 5' (1.524 m)  Wt 210 lb (95.3 kg)  BMI 41.01 kg/m2    General: healthy, alert and in no distress    HEENT: no scleral icterus or conjunctival erythema   Skin: no suspicious lesions or rash. No jaundice.   CV: regular rhythm by palpation, 2+ distal pulses, no pedal edema    Resp: normal respiratory effort without conversational dyspnea   Psych: normal mood and affect    Gait: mildly antalgic, appropriate coordination and balance   Neuro: normal light touch sensory exam of the extremities. Motor strength as noted below     Bilateral Knee exam    ROM:        Flexion 110 degrees on R, 120 on L       Extension -2 degrees       Range of motion limited by pain, mechanical    Inspection:       no  visible ecchymosis        effusion noted trace    Skin:       no visible deformities       well perfused       capillary refill brisk    Patellar Motion:        Crepitus noted in the patellofemoral joint    Tender:        medial patellar border       lateral patellar border       medial joint line       lateral joint line    Non Tender:         remainder of knee area        along MCL        distal IT Band        infrapatellar tendon        tibial tubercle       pes anserine bursa    Special Tests:        neg (-) varus at 0 deg and 30 deg       neg (-) valgus at 0 deg and 30 deg    Evaluation of ipsilateral kinetic chain       decreased strength with resisted abduction of the right hip       decreased strength with resisted extension of the right hip       B/l decreased quad tone and core deconditioning    Sports Medicine Clinic Procedure  Ultrasound Guided Bilateral Intra-Articular Knee Aspiration & Injection    Technique: The risks of the procedure were explained to the patient.  A consent was signed for the intra-articular knee procedure.  The patient was evaluated with a [employer] ultrasound machine using a 12 MHz linear probe.   The Bilateral knee was prepped and draped in a sterile manner.  Ultrasound identification of the patella, suprapatellar pouch, quadriceps tendon and femur in both long and short axis. The probe was placed in short axis to the Bilateral femur.  A 1.5 inch 18 gauge needle was placed under ultrasound guidance into the superior knee joint.  15 ml of clear yellow colored fluid was aspirated on L, 12ml on R   A mixture of 2 ml's 1% lidocaine, 2ml's 0.5% marcaine, and 1 ml kenalog (40mg/ml) was injected without difficulty. The needle was removed and there was good hemostasis without complications.  There was ultrasound documentation of needle placement and injection.  Pre-procedural pain 7/10 on R, 6/10 on L.  Post procedural pain 2/10 b/l.    Radiology  XR KNEE BILATERAL 3 VW 6/3/2016 9:58  AM     HISTORY: Bilateral knee pain.     COMPARISON: None.     FINDINGS: 3 views of the right knee. Moderate tricompartmental  osteophyte formation. Joint spaces are preserved. No joint effusion.     3 views of the left knee. Moderate tricompartmental osteophyte  formation. Joint spaces are preserved. No joint effusion.         IMPRESSION: Moderate bilateral osteoarthritis, predominantly  characterized by marginal osteophyte formation.    Assessment:  1. Primary osteoarthritis of knees, bilateral    2. Arthralgia of both lower legs        Plan:  Discussed the assessment with the patient.  Follow up: prn  Signs of OA clinically and on XR  XR images independently visualized and reviewed with patient today in clinic  No repeat imaging today  B/l US guided CSI  Modest benefit previously  Hopeful that aspiration with US guided injection will be more helpful  Reviewed wt loss, activity modification and progressive increase in activity as tolerated and guided by pain  Reviewed options for potential steroid vs viscosupplementation injections and the possibility for future orthopedic referral prn  Reviewed safe and appropriate OTC medication choices, try tylenol first  Up to 3000mg daily of tylenol is generally safe, NSAID dosing and duration limitations reviewed  Discussed nature of degenerative arthrosis of the knee.   Discussed symptom treatment with ice or heat, topical treatments, and rest if needed.   Expectations and goals of CSI reviewed  Often 2-3 days for steroid effect, and can take up to two weeks for maximum effect  We discussed modified progressive pain-free activity as tolerated  Do not overuse in first two weeks if feeling better due to concern for vulnerability while steroid is working  Supportive care reviewed  All questions were answered today  Contact us with additional questions or concerns  Signs and sx of concern reviewed    Thanks very much for sending this nice lady to us, I will keep you updated  with her progress      Zuhair Davenport DO, CATANO  Primary Care Sports Medicine  [facility]               Disclaimer: This note consists of symbols derived from keyboarding, dictation and/or voice recognition software. As a result, there may be errors in the script that have gone undetected. Please consider this when interpreting information found in this chart.

## 2017-04-24 RX ORDER — TRIAMCINOLONE ACETONIDE 40 MG/ML
80 INJECTION, SUSPENSION INTRA-ARTICULAR; INTRAMUSCULAR ONCE
Qty: 2 ML | Refills: 0 | OUTPATIENT
Start: 2017-04-24 | End: 2017-04-24

## 2017-08-07 NOTE — PROGRESS NOTES
SUBJECTIVE:                                                    Melissa Coronado is a 62 year old female who presents to clinic today for the following health issues:      Hypertension Follow-up      Outpatient blood pressures are being checked at home.  Results are little high.    Low Salt Diet: not monitoring salt        Amount of exercise or physical activity: walk sosmetimew    Problems taking medications regularly: No    Medication side effects: hard time getting medication from pharmacy on time    Diet: regular (no restrictions)      Running 150's/ 80-90's  She was previously getting care through a clinic in [facility].   Only ever prescribed HCTZ for her BP.   Denies chest pain, palpitations, headaches, vision changes, dizziness, numbness, tingling, and peripheral edema.  She has only 2 doses of her blood pressure medication left. She has run out of the potassium.   She did not take any of her medication this morning.   Unsure what dosage of potassium and HCTZ she is taking.   She denies any other concerns.    Problem list and histories reviewed & adjusted, as indicated.  Additional history: as documented    Patient Active Problem List   Diagnosis     Hypertension goal BP (blood pressure) < 140/90     History reviewed. No pertinent surgical history.    Social History   Substance Use Topics     Smoking status: Never Smoker     Smokeless tobacco: Not on file     Alcohol use No     Family History   Problem Relation Age of Onset     Hernia Brother      Other - See Comments Son      suffers from sneezing disorder         Current Outpatient Prescriptions   Medication Sig Dispense Refill     naproxen sodium (ALEVE) 220 MG tablet Take by mouth 2 times daily (with meals)       HYDROCHLOROTHIAZIDE PO        Acetaminophen (TYLENOL EXTRA STRENGTH PO) Take 500 mg by mouth Reported on 4/13/2017       No Known Allergies  BP Readings from Last 3 Encounters:   08/08/17 195/84   04/21/17 168/71   04/13/17 147/63    Wt  Readings from Last 3 Encounters:   08/08/17 216 lb (98 kg)   04/21/17 210 lb (95.3 kg)   04/13/17 210 lb (95.3 kg)                        Reviewed and updated as needed this visit by clinical staff     Reviewed and updated as needed this visit by Provider         ROS:  Constitutional, HEENT, cardiovascular, pulmonary, neurological and integumentary systems are negative, except as otherwise noted.      OBJECTIVE:   /82  Pulse 62  Temp 97.4  F (36.3  C) (Oral)  Wt 216 lb (98 kg)  SpO2 98%  BMI 42.18 kg/m2  Body mass index is 42.18 kg/(m^2).  GENERAL: healthy, alert and no distress  EYES: Eyes grossly normal to inspection  HENT: normal cephalic/atraumatic, nose and mouth without ulcers or lesions, oropharynx clear and oral mucous membranes moist  NECK: no adenopathy, no asymmetry, masses, or scars and thyroid normal to palpation  RESP: lungs clear to auscultation - no rales, rhonchi or wheezes  CV: regular rate and rhythm, normal S1 S2, no S3 or S4, no murmur, click or rub, no peripheral edema and peripheral pulses strong  MS: no gross musculoskeletal defects noted, no edema  SKIN: no suspicious lesions or rashes  NEURO: Normal strength and tone, mentation intact and speech normal        ASSESSMENT/PLAN:     1. Hypertension goal BP (blood pressure) < 140/90  Uncontrolled. Stop HCTZ and potassium. Start Cardizem. Recheck in 2 weeks.   - diltiazem (CARDIZEM CD) 120 MG 24 hr capsule; Take 1 capsule (120 mg) by mouth daily  Dispense: 30 capsule; Refill: 0    Patient Instructions   Take your new medication as prescribed.    Return in 2 weeks for a recheck of your blood pressure. Recommend follow up with Dr. Crandall - our internal medicine provider.    Follow up with your eye doctor as soon as possible to continue care of your glaucoma.       Established High Blood Pressure    High blood pressure (hypertension) is a chronic disease. Often, healthcare providers don t know what causes it. But it can be caused by  certain health conditions and medicines.  If you have high blood pressure, you may not have any symptoms. If you do have symptoms, they may include headache, dizziness, changes in your vision, chest pain, and shortness of breath. But even without symptoms, high blood pressure that s not treated raises your risk for heart attack and stroke. High blood pressure is a serious health risk and shouldn t be ignored.  A blood pressure reading is made up of two numbers: a higher number over a lower number. The top number is the systolic pressure. The bottom number is the diastolic pressure. A normal blood pressure is a systolic pressure of  less than 120 over a diastolic pressure of less than 80. You will see your blood pressure readings written together. For example, a person with a systolic pressure of 188 and a diastolic pressure of 78 will have 118/78 written in the medical record.  High blood pressure is when either the top number is 140 or higher, or the bottom number is 90 or higher. This must be the result when taking your blood pressure a number of times. The blood pressures between normal and high are called prehypertension.  Home care  If you have high blood pressure, you should do what is listed below to lower your blood pressure. If you are taking medicines for high blood pressure, these methods may reduce or end your need for medicines in the future.    Begin a weight-loss program if you are overweight.    Cut back on how much salt you get in your diet. Here s how to do this:    Don t eat foods that have a lot of salt. These include olives, pickles, smoked meats, and salted potato chips.    Don t add salt to your food at the table.    Use only small amounts of salt when cooking.    Start an exercise program. Talk with your healthcare provider about the type of exercise program that would be best for you. It doesn't have to be hard. Even brisk walking for 20 minutes 3 times a week is a good form of  exercise.    Don t take medicines that stimulate the heart. This includes many over-the-counter cold and sinus decongestant pills and sprays, as well as diet pills. Check the warnings about hypertension on the label. Before buying any over-the-counter medicines or supplements, always ask the pharmacist about the product's potential interaction with your high blood pressure and your high blood pressure medicines.    Stimulants such as amphetamine or cocaine could be deadly for someone with high blood pressure. Never take these.    Limit how much caffeine you get in your diet. Switch to caffeine-free products.    Stop smoking. If you are a long-time smoker, this can be hard. Talk to your healthcare provider about medicines and nicotine replacement options to help you. Also, enroll in a stop-smoking program to make it more likely that you will quit for good.    Learn how to handle stress. This is an important part of any program to lower blood pressure. Learn about relaxation methods like meditation, yoga, or biofeedback.    If your provider prescribed medicines, take them exactly as directed. Missing doses may cause your blood pressure get out of control.    If you miss a dose or doses, check with your healthcare provider or pharmacist about what to do.    Consider buying an automatic blood pressure machine. Ask your provider for a recommendation. You can get one of these at most pharmacies.     The American Heart Association recommends the following guidelines for home blood pressure monitoring:    Don't smoke or drink coffee for 30 minutes before taking your blood pressure.    Go to the bathroom before the test.    Relax for 5 minutes before taking the measurement.    Sit with your back supported (don't sit on a couch or soft chair); keep your feet on the floor uncrossed. Place your arm on a solid flat surface (like a table) with the upper part of the arm at heart level. Place the middle of the cuff directly above  the eye of the elbow. Check the monitor's instruction manual for an illustration.    Take multiple readings. When you measure, take 2 to 3 readings one minute apart and record all of the results.    Take your blood pressure at the same time every day, or as your healthcare provider recommends.    Record the date, time, and blood pressure reading.    Take the record with you to your next medical appointment. If your blood pressure monitor has a built-in memory, simply take the monitor with you to your next appointment.    Call your provider if you have several high readings. Don't be frightened by a single high blood pressure reading, but if you get several high readings, check in with your healthcare provider.    Note: When blood pressure reaches a systolic (top number) of 180 or higher OR diastolic (bottom number) of 110 or higher, seek emergency medical treatment.  Follow-up care  You will need to see your healthcare provider regularly. This is to check your blood pressure and to make changes to your medicines. Make a follow-up appointment as directed. Bring the record of your home blood pressure readings to the appointment.  When to seek medical advice  Call your healthcare provider right away if any of these occur:    Blood pressure reaches a systolic (upper number) of 180 or higher OR a diastolic (bottom number) of 110 or higher    Chest pain or shortness of breath    Severe headache    Throbbing or rushing sound in the ears    Nosebleed    Sudden severe pain in your belly (abdomen)    Extreme drowsiness, confusion, or fainting    Dizziness or spinning sensation (vertigo)    Weakness of an arm or leg or one side of the face    You have problems speaking or seeing   Date Last Reviewed: 12/1/2016 2000-2017 [employer]. [address] [place] [address], [place] [address]. All rights reserved. This information is not intended as a substitute for professional medical care. Always follow your healthcare  professional's instructions.            Milla Koch PA-C  [facility]

## 2017-08-08 ENCOUNTER — OFFICE VISIT (OUTPATIENT)
Dept: FAMILY MEDICINE | Facility: CLINIC | Age: 62
End: 2017-08-08
Payer: COMMERCIAL

## 2017-08-08 VITALS
WEIGHT: 216 LBS | HEART RATE: 62 BPM | OXYGEN SATURATION: 98 % | BODY MASS INDEX: 42.18 KG/M2 | DIASTOLIC BLOOD PRESSURE: 82 MMHG | TEMPERATURE: 97.4 F | SYSTOLIC BLOOD PRESSURE: 170 MMHG

## 2017-08-08 DIAGNOSIS — I10 HYPERTENSION GOAL BP (BLOOD PRESSURE) < 140/90: Primary | ICD-10-CM

## 2017-08-08 PROCEDURE — 99214 OFFICE O/P EST MOD 30 MIN: CPT | Performed by: PHYSICIAN ASSISTANT

## 2017-08-08 RX ORDER — DILTIAZEM HYDROCHLORIDE 120 MG/1
120 CAPSULE, COATED, EXTENDED RELEASE ORAL DAILY
Qty: 30 CAPSULE | Refills: 0 | Status: SHIPPED | OUTPATIENT
Start: 2017-08-08 | End: 2017-09-04

## 2017-08-08 RX ORDER — POTASSIUM CHLORIDE 750 MG/1
10 TABLET, EXTENDED RELEASE ORAL
Qty: 30 TABLET | Refills: 0 | Status: CANCELLED | OUTPATIENT
Start: 2017-08-08

## 2017-08-08 RX ORDER — HYDROCHLOROTHIAZIDE 25 MG/1
25 TABLET ORAL DAILY
Qty: 30 TABLET | Refills: 1 | Status: CANCELLED | OUTPATIENT
Start: 2017-08-08

## 2017-08-08 ASSESSMENT — ANXIETY QUESTIONNAIRES
2. NOT BEING ABLE TO STOP OR CONTROL WORRYING: NOT AT ALL
5. BEING SO RESTLESS THAT IT IS HARD TO SIT STILL: NOT AT ALL
7. FEELING AFRAID AS IF SOMETHING AWFUL MIGHT HAPPEN: NOT AT ALL
IF YOU CHECKED OFF ANY PROBLEMS ON THIS QUESTIONNAIRE, HOW DIFFICULT HAVE THESE PROBLEMS MADE IT FOR YOU TO DO YOUR WORK, TAKE CARE OF THINGS AT HOME, OR GET ALONG WITH OTHER PEOPLE: NOT DIFFICULT AT ALL
6. BECOMING EASILY ANNOYED OR IRRITABLE: NOT AT ALL
1. FEELING NERVOUS, ANXIOUS, OR ON EDGE: MORE THAN HALF THE DAYS

## 2017-08-08 ASSESSMENT — PATIENT HEALTH QUESTIONNAIRE - PHQ9
SUM OF ALL RESPONSES TO PHQ QUESTIONS 1-9: 3
5. POOR APPETITE OR OVEREATING: NOT AT ALL

## 2017-08-08 NOTE — NURSING NOTE
Chief Complaint   Patient presents with     Hypertension       Initial /84  Pulse 62  Temp 97.4  F (36.3  C) (Oral)  Wt 216 lb (98 kg)  SpO2 98%  BMI 42.18 kg/m2 Estimated body mass index is 42.18 kg/(m^2) as calculated from the following:    Height as of 4/21/17: 5' (1.524 m).    Weight as of this encounter: 216 lb (98 kg).  Medication Reconciliation: complete     Lesa Michael, cma

## 2017-08-08 NOTE — MR AVS SNAPSHOT
After Visit Summary   8/8/2017    Melissa Coronado    MRN: [id number]           Patient Information     Date Of Birth          1955        Visit Information        Provider Department      8/8/2017 9:50 AM Milla Koch PA-C [facility]        Today's Diagnoses     Hypertension goal BP (blood pressure) < 140/90    -  1      Care Instructions    Take your new medication as prescribed.    Return in 2 weeks for a recheck of your blood pressure. Recommend follow up with Dr. Crandall - our internal medicine provider.    Follow up with your eye doctor as soon as possible to continue care of your glaucoma.       Established High Blood Pressure    High blood pressure (hypertension) is a chronic disease. Often, healthcare providers don t know what causes it. But it can be caused by certain health conditions and medicines.  If you have high blood pressure, you may not have any symptoms. If you do have symptoms, they may include headache, dizziness, changes in your vision, chest pain, and shortness of breath. But even without symptoms, high blood pressure that s not treated raises your risk for heart attack and stroke. High blood pressure is a serious health risk and shouldn t be ignored.  A blood pressure reading is made up of two numbers: a higher number over a lower number. The top number is the systolic pressure. The bottom number is the diastolic pressure. A normal blood pressure is a systolic pressure of  less than 120 over a diastolic pressure of less than 80. You will see your blood pressure readings written together. For example, a person with a systolic pressure of 188 and a diastolic pressure of 78 will have 118/78 written in the medical record.  High blood pressure is when either the top number is 140 or higher, or the bottom number is 90 or higher. This must be the result when taking your blood pressure a number of times. The blood pressures between normal and high are called  prehypertension.  Home care  If you have high blood pressure, you should do what is listed below to lower your blood pressure. If you are taking medicines for high blood pressure, these methods may reduce or end your need for medicines in the future.    Begin a weight-loss program if you are overweight.    Cut back on how much salt you get in your diet. Here s how to do this:    Don t eat foods that have a lot of salt. These include olives, pickles, smoked meats, and salted potato chips.    Don t add salt to your food at the table.    Use only small amounts of salt when cooking.    Start an exercise program. Talk with your healthcare provider about the type of exercise program that would be best for you. It doesn't have to be hard. Even brisk walking for 20 minutes 3 times a week is a good form of exercise.    Don t take medicines that stimulate the heart. This includes many over-the-counter cold and sinus decongestant pills and sprays, as well as diet pills. Check the warnings about hypertension on the label. Before buying any over-the-counter medicines or supplements, always ask the pharmacist about the product's potential interaction with your high blood pressure and your high blood pressure medicines.    Stimulants such as amphetamine or cocaine could be deadly for someone with high blood pressure. Never take these.    Limit how much caffeine you get in your diet. Switch to caffeine-free products.    Stop smoking. If you are a long-time smoker, this can be hard. Talk to your healthcare provider about medicines and nicotine replacement options to help you. Also, enroll in a stop-smoking program to make it more likely that you will quit for good.    Learn how to handle stress. This is an important part of any program to lower blood pressure. Learn about relaxation methods like meditation, yoga, or biofeedback.    If your provider prescribed medicines, take them exactly as directed. Missing doses may cause your  blood pressure get out of control.    If you miss a dose or doses, check with your healthcare provider or pharmacist about what to do.    Consider buying an automatic blood pressure machine. Ask your provider for a recommendation. You can get one of these at most pharmacies.     The American Heart Association recommends the following guidelines for home blood pressure monitoring:    Don't smoke or drink coffee for 30 minutes before taking your blood pressure.    Go to the bathroom before the test.    Relax for 5 minutes before taking the measurement.    Sit with your back supported (don't sit on a couch or soft chair); keep your feet on the floor uncrossed. Place your arm on a solid flat surface (like a table) with the upper part of the arm at heart level. Place the middle of the cuff directly above the eye of the elbow. Check the monitor's instruction manual for an illustration.    Take multiple readings. When you measure, take 2 to 3 readings one minute apart and record all of the results.    Take your blood pressure at the same time every day, or as your healthcare provider recommends.    Record the date, time, and blood pressure reading.    Take the record with you to your next medical appointment. If your blood pressure monitor has a built-in memory, simply take the monitor with you to your next appointment.    Call your provider if you have several high readings. Don't be frightened by a single high blood pressure reading, but if you get several high readings, check in with your healthcare provider.    Note: When blood pressure reaches a systolic (top number) of 180 or higher OR diastolic (bottom number) of 110 or higher, seek emergency medical treatment.  Follow-up care  You will need to see your healthcare provider regularly. This is to check your blood pressure and to make changes to your medicines. Make a follow-up appointment as directed. Bring the record of your home blood pressure readings to the  "appointment.  When to seek medical advice  Call your healthcare provider right away if any of these occur:    Blood pressure reaches a systolic (upper number) of 180 or higher OR a diastolic (bottom number) of 110 or higher    Chest pain or shortness of breath    Severe headache    Throbbing or rushing sound in the ears    Nosebleed    Sudden severe pain in your belly (abdomen)    Extreme drowsiness, confusion, or fainting    Dizziness or spinning sensation (vertigo)    Weakness of an arm or leg or one side of the face    You have problems speaking or seeing   Date Last Reviewed: 12/1/2016 2000-2017 [employer] [address] [place] [address]. All rights reserved. This information is not intended as a substitute for professional medical care. Always follow your healthcare professional's instructions.                Follow-ups after your visit        Who to contact     If you have questions or need follow up information about today's clinic visit or your schedule please contact [facility] directly at [phone].  Normal or non-critical lab and imaging results will be communicated to you by [employer], letter or phone within 4 business days after the clinic has received the results. If you do not hear from us within 7 days, please contact the clinic through [employer] or phone. If you have a critical or abnormal lab result, we will notify you by phone as soon as possible.  Submit refill requests through [employer] or call your pharmacy and they will forward the refill request to us. Please allow 3 business days for your refill to be completed.          Additional Information About Your Visit        [employer] Information     [employer] lets you send messages to your doctor, view your test results, renew your prescriptions, schedule appointments and more. To sign up, go to [email or web address] . Click on \"Log in\" on the left side of the screen, which will take you to the Welcome page. " "Then click on \"Sign up Now\" on the right side of the page.     You will be asked to enter the access code listed below, as well as some personal information. Please follow the directions to create your username and password.     Your access code is: [email or web address]  Expires: 2017 10:53 AM     Your access code will  in 90 days. If you need help or a new code, please call your [place] clinic or [phone].        Care EveryWhere ID     This is your Care EveryWhere ID. This could be used by other organizations to access your [place] medical records  [id number]        Your Vitals Were     Pulse Temperature Pulse Oximetry BMI (Body Mass Index)          62 97.4  F (36.3  C) (Oral) 98% 42.18 kg/m2         Blood Pressure from Last 3 Encounters:   17 170/82   17 168/71   17 147/63    Weight from Last 3 Encounters:   17 216 lb (98 kg)   17 210 lb (95.3 kg)   17 210 lb (95.3 kg)              Today, you had the following     No orders found for display         Today's Medication Changes          These changes are accurate as of: 17 10:53 AM.  If you have any questions, ask your nurse or doctor.               Start taking these medicines.        Dose/Directions    diltiazem 120 MG 24 hr capsule   Commonly known as:  CARDIZEM CD   Used for:  Hypertension goal BP (blood pressure) < 140/90   Started by:  Milla Koch PA-C        Dose:  120 mg   Take 1 capsule (120 mg) by mouth daily   Quantity:  30 capsule   Refills:  0         Stop taking these medicines if you haven't already. Please contact your care team if you have questions.     POTASSIUM CHLORIDE PO   Stopped by:  Milla Koch PA-C                Where to get your medicines      These medications were sent to [facility]/pharmacy #[id number] - [place] -  [address] [place] [address]     Phone:  [phone]     diltiazem 120 MG 24 hr capsule       "          Primary Care Provider Office Phone #    [facility] [phone]       No address on file        Equal Access to Services     JOHN MAJOR : Hadii aad ku hadtiffanie Castañeda, wajudyda luqdavid, yazta kakeyonnada belia, leonila tioin hayaan keibrandt carroll laEvanyari murray. So [facility] [phone].    ATENCIÓN: Si habla español, tiene a sprague disposición servicios gratuitos de asistencia lingüística. Llame al [phone].    We comply with applicable federal civil rights laws and [place] laws. We do not discriminate on the basis of race, color, national origin, age, disability sex, sexual orientation or gender identity.            Thank you!     Thank you for choosing [facility]  for your care. Our goal is always to provide you with excellent care. Hearing back from our patients is one way we can continue to improve our services. Please take a few minutes to complete the written survey that you may receive in the mail after your visit with us. Thank you!             Your Updated Medication List - Protect others around you: Learn how to safely use, store and throw away your medicines at www.disposemymeds.org.          This list is accurate as of: 8/8/17 10:53 AM.  Always use your most recent med list.                   Brand Name Dispense Instructions for use Diagnosis    ALEVE 220 MG tablet   Generic drug:  naproxen sodium      Take by mouth 2 times daily (with meals)        diltiazem 120 MG 24 hr capsule    CARDIZEM CD    30 capsule    Take 1 capsule (120 mg) by mouth daily    Hypertension goal BP (blood pressure) < 140/90       HYDROCHLOROTHIAZIDE PO           TYLENOL EXTRA STRENGTH PO      Take 500 mg by mouth Reported on 4/13/2017

## 2017-08-08 NOTE — PATIENT INSTRUCTIONS
Take your new medication as prescribed.    Return in 2 weeks for a recheck of your blood pressure. Recommend follow up with Dr. Crandall - our internal medicine provider.    Follow up with your eye doctor as soon as possible to continue care of your glaucoma.       Established High Blood Pressure    High blood pressure (hypertension) is a chronic disease. Often, healthcare providers don t know what causes it. But it can be caused by certain health conditions and medicines.  If you have high blood pressure, you may not have any symptoms. If you do have symptoms, they may include headache, dizziness, changes in your vision, chest pain, and shortness of breath. But even without symptoms, high blood pressure that s not treated raises your risk for heart attack and stroke. High blood pressure is a serious health risk and shouldn t be ignored.  A blood pressure reading is made up of two numbers: a higher number over a lower number. The top number is the systolic pressure. The bottom number is the diastolic pressure. A normal blood pressure is a systolic pressure of  less than 120 over a diastolic pressure of less than 80. You will see your blood pressure readings written together. For example, a person with a systolic pressure of 188 and a diastolic pressure of 78 will have 118/78 written in the medical record.  High blood pressure is when either the top number is 140 or higher, or the bottom number is 90 or higher. This must be the result when taking your blood pressure a number of times. The blood pressures between normal and high are called prehypertension.  Home care  If you have high blood pressure, you should do what is listed below to lower your blood pressure. If you are taking medicines for high blood pressure, these methods may reduce or end your need for medicines in the future.    Begin a weight-loss program if you are overweight.    Cut back on how much salt you get in your diet. Here s how to do  this:    Don t eat foods that have a lot of salt. These include olives, pickles, smoked meats, and salted potato chips.    Don t add salt to your food at the table.    Use only small amounts of salt when cooking.    Start an exercise program. Talk with your healthcare provider about the type of exercise program that would be best for you. It doesn't have to be hard. Even brisk walking for 20 minutes 3 times a week is a good form of exercise.    Don t take medicines that stimulate the heart. This includes many over-the-counter cold and sinus decongestant pills and sprays, as well as diet pills. Check the warnings about hypertension on the label. Before buying any over-the-counter medicines or supplements, always ask the pharmacist about the product's potential interaction with your high blood pressure and your high blood pressure medicines.    Stimulants such as amphetamine or cocaine could be deadly for someone with high blood pressure. Never take these.    Limit how much caffeine you get in your diet. Switch to caffeine-free products.    Stop smoking. If you are a long-time smoker, this can be hard. Talk to your healthcare provider about medicines and nicotine replacement options to help you. Also, enroll in a stop-smoking program to make it more likely that you will quit for good.    Learn how to handle stress. This is an important part of any program to lower blood pressure. Learn about relaxation methods like meditation, yoga, or biofeedback.    If your provider prescribed medicines, take them exactly as directed. Missing doses may cause your blood pressure get out of control.    If you miss a dose or doses, check with your healthcare provider or pharmacist about what to do.    Consider buying an automatic blood pressure machine. Ask your provider for a recommendation. You can get one of these at most pharmacies.     The American [employer] Association recommends the following guidelines for home blood pressure  monitoring:    Don't smoke or drink coffee for 30 minutes before taking your blood pressure.    Go to the bathroom before the test.    Relax for 5 minutes before taking the measurement.    Sit with your back supported (don't sit on a couch or soft chair); keep your feet on the floor uncrossed. Place your arm on a solid flat surface (like a table) with the upper part of the arm at heart level. Place the middle of the cuff directly above the eye of the elbow. Check the monitor's instruction manual for an illustration.    Take multiple readings. When you measure, take 2 to 3 readings one minute apart and record all of the results.    Take your blood pressure at the same time every day, or as your healthcare provider recommends.    Record the date, time, and blood pressure reading.    Take the record with you to your next medical appointment. If your blood pressure monitor has a built-in memory, simply take the monitor with you to your next appointment.    Call your provider if you have several high readings. Don't be frightened by a single high blood pressure reading, but if you get several high readings, check in with your healthcare provider.    Note: When blood pressure reaches a systolic (top number) of 180 or higher OR diastolic (bottom number) of 110 or higher, seek emergency medical treatment.  Follow-up care  You will need to see your healthcare provider regularly. This is to check your blood pressure and to make changes to your medicines. Make a follow-up appointment as directed. Bring the record of your home blood pressure readings to the appointment.  When to seek medical advice  Call your healthcare provider right away if any of these occur:    Blood pressure reaches a systolic (upper number) of 180 or higher OR a diastolic (bottom number) of 110 or higher    Chest pain or shortness of breath    Severe headache    Throbbing or rushing sound in the ears    Nosebleed    Sudden severe pain in your belly  (abdomen)    Extreme drowsiness, confusion, or fainting    Dizziness or spinning sensation (vertigo)    Weakness of an arm or leg or one side of the face    You have problems speaking or seeing   Date Last Reviewed: 12/1/2016 2000-2017 [employer]. [address] [place] [id number]. All rights reserved. This information is not intended as a substitute for professional medical care. Always follow your healthcare professional's instructions.

## 2017-08-21 ENCOUNTER — OFFICE VISIT (OUTPATIENT)
Dept: INTERNAL MEDICINE | Facility: CLINIC | Age: 62
End: 2017-08-21
Payer: COMMERCIAL

## 2017-08-21 VITALS
TEMPERATURE: 97.7 F | BODY MASS INDEX: 41.52 KG/M2 | DIASTOLIC BLOOD PRESSURE: 68 MMHG | HEART RATE: 62 BPM | OXYGEN SATURATION: 96 % | WEIGHT: 212.6 LBS | SYSTOLIC BLOOD PRESSURE: 148 MMHG

## 2017-08-21 DIAGNOSIS — I10 BENIGN ESSENTIAL HYPERTENSION: Primary | ICD-10-CM

## 2017-08-21 DIAGNOSIS — G44.219 EPISODIC TENSION-TYPE HEADACHE, NOT INTRACTABLE: ICD-10-CM

## 2017-08-21 DIAGNOSIS — E66.01 MORBID OBESITY, UNSPECIFIED OBESITY TYPE (H): ICD-10-CM

## 2017-08-21 LAB
ANION GAP SERPL CALCULATED.3IONS-SCNC: 4 MMOL/L (ref 3–14)
BUN SERPL-MCNC: 17 MG/DL (ref 7–30)
CALCIUM SERPL-MCNC: 9.2 MG/DL (ref 8.5–10.1)
CHLORIDE SERPL-SCNC: 106 MMOL/L (ref 94–109)
CO2 SERPL-SCNC: 30 MMOL/L (ref 20–32)
CREAT SERPL-MCNC: 0.58 MG/DL (ref 0.52–1.04)
GFR SERPL CREATININE-BSD FRML MDRD: >90 ML/MIN/1.7M2
GLUCOSE SERPL-MCNC: 103 MG/DL (ref 70–99)
POTASSIUM SERPL-SCNC: 3.8 MMOL/L (ref 3.4–5.3)
SODIUM SERPL-SCNC: 140 MMOL/L (ref 133–144)

## 2017-08-21 PROCEDURE — 36415 COLL VENOUS BLD VENIPUNCTURE: CPT | Performed by: INTERNAL MEDICINE

## 2017-08-21 PROCEDURE — 80048 BASIC METABOLIC PNL TOTAL CA: CPT | Performed by: INTERNAL MEDICINE

## 2017-08-21 PROCEDURE — 99214 OFFICE O/P EST MOD 30 MIN: CPT | Performed by: INTERNAL MEDICINE

## 2017-08-21 RX ORDER — METOPROLOL SUCCINATE 25 MG/1
12.5 TABLET, EXTENDED RELEASE ORAL DAILY
Qty: 45 TABLET | Refills: 1 | Status: SHIPPED | OUTPATIENT
Start: 2017-08-21 | End: 2018-01-18

## 2017-08-21 ASSESSMENT — ANXIETY QUESTIONNAIRES
6. BECOMING EASILY ANNOYED OR IRRITABLE: NOT AT ALL
5. BEING SO RESTLESS THAT IT IS HARD TO SIT STILL: NOT AT ALL
3. WORRYING TOO MUCH ABOUT DIFFERENT THINGS: NOT AT ALL
7. FEELING AFRAID AS IF SOMETHING AWFUL MIGHT HAPPEN: SEVERAL DAYS
6. BECOMING EASILY ANNOYED OR IRRITABLE: NOT AT ALL
2. NOT BEING ABLE TO STOP OR CONTROL WORRYING: NOT AT ALL
GAD7 TOTAL SCORE: 3
1. FEELING NERVOUS, ANXIOUS, OR ON EDGE: SEVERAL DAYS
2. NOT BEING ABLE TO STOP OR CONTROL WORRYING: NOT AT ALL
IF YOU CHECKED OFF ANY PROBLEMS ON THIS QUESTIONNAIRE, HOW DIFFICULT HAVE THESE PROBLEMS MADE IT FOR YOU TO DO YOUR WORK, TAKE CARE OF THINGS AT HOME, OR GET ALONG WITH OTHER PEOPLE: NOT DIFFICULT AT ALL
7. FEELING AFRAID AS IF SOMETHING AWFUL MIGHT HAPPEN: SEVERAL DAYS
5. BEING SO RESTLESS THAT IT IS HARD TO SIT STILL: NOT AT ALL
3. WORRYING TOO MUCH ABOUT DIFFERENT THINGS: NOT AT ALL
IF YOU CHECKED OFF ANY PROBLEMS ON THIS QUESTIONNAIRE, HOW DIFFICULT HAVE THESE PROBLEMS MADE IT FOR YOU TO DO YOUR WORK, TAKE CARE OF THINGS AT HOME, OR GET ALONG WITH OTHER PEOPLE: NOT DIFFICULT AT ALL
GAD7 TOTAL SCORE: 2
1. FEELING NERVOUS, ANXIOUS, OR ON EDGE: NOT AT ALL

## 2017-08-21 ASSESSMENT — PATIENT HEALTH QUESTIONNAIRE - PHQ9
5. POOR APPETITE OR OVEREATING: SEVERAL DAYS
5. POOR APPETITE OR OVEREATING: SEVERAL DAYS

## 2017-08-21 NOTE — PROGRESS NOTES
SUBJECTIVE:   Melissa Coronado is a 62 year old female who presents to clinic today for the following health issues:      Hypertension Follow-up      Outpatient blood pressures are being checked at home.  Results are >140/90.    Low Salt Diet: no added salt        Amount of exercise or physical activity: 2-3 days/week for an average of 30-45 minutes    Problems taking medications regularly: Yes,      Medication side effects: headache  Diet: low fat/cholesterol      Seen by another clinic provider (Milla Koch) 2 weeks ago, BP was uncontrolled at that time, patient was switched from hydrochlorothiazide (d/t prev noted hypokalemia, 6 months ago-actually, she had run out of hydrochlorothiazide when seen 2 weeks ago) and started on diltiazem. She feels that her headaches at the back of the head are not as well controlled on the diltiazem.    Patient denies chest pain, chest pressure, shortness of breath, or any noted lower extremity swelling.   She has had visual changes lately (for the past several months)-she has made an eye doctor appointment.  sometimes feels palpitations about once a month-lasting a few minutes at a time-sometimes gets this with shortness of breath, when she walks for a long time-usually when she walks faster.  Also gets left-sided LBP when she walks faster.     Her headaches: can stretch from the back to the front of the head, (but) often unilateral and involving the eye; no teariness of the eyes.    She is not sure if she has migraines in her family.     Patient is originally from [facility].     (d.t a misunderstanding, 2 FAHEEM-7 were filled today, but they are both low scores).      Problem list and histories reviewed & adjusted, as indicated.  Additional history: as documented    Patient Active Problem List   Diagnosis     Hypertension goal BP (blood pressure) < 140/90     Benign essential hypertension     History reviewed. No pertinent surgical history.    Social History   Substance Use Topics      Smoking status: Never Smoker     Smokeless tobacco: Never Used     Alcohol use Yes      Comment: small amt     Family History   Problem Relation Age of Onset     Hernia Brother      Other - See Comments Son      suffers from sneezing disorder         Current Outpatient Prescriptions   Medication Sig Dispense Refill     metoprolol (TOPROL-XL) 25 MG 24 hr tablet Take 0.5 tablets (12.5 mg) by mouth daily 45 tablet 1     diltiazem (CARDIZEM CD) 120 MG 24 hr capsule Take 1 capsule (120 mg) by mouth daily 30 capsule 0     naproxen sodium (ALEVE) 220 MG tablet Take by mouth 2 times daily (with meals)       Acetaminophen (TYLENOL EXTRA STRENGTH PO) Take 500 mg by mouth Reported on 4/13/2017           Reviewed and updated as needed this visit by clinical staffTobacco  Allergies  Meds  Med Hx  Surg Hx  Fam Hx  Soc Hx      Reviewed and updated as needed this visit by Provider       ==============================================================  ROS:  Constitutional, HEENT, cardiovascular, pulmonary, GI, , musculoskeletal, neuro, skin, endocrine and psych systems are negative, except as otherwise noted.         OBJECTIVE:                                                    /68 (BP Location: Right arm, Patient Position: Chair, Cuff Size: Adult Large)  Pulse 62  Temp 97.7  F (36.5  C) (Oral)  Wt 212 lb 9.6 oz (96.4 kg)  SpO2 96%  BMI 41.52 kg/m2  Body mass index is 41.52 kg/(m^2).     Vitals:    08/21/17 1004   BP: 148/68   BP Location: Right arm   Patient Position: Chair   Cuff Size: Adult Large   Pulse: 62   Temp: 97.7  F (36.5  C)   TempSrc: Oral   SpO2: 96%   Weight: 212 lb 9.6 oz (96.4 kg)     GENERAL APPEARANCE: healthy, alert and in no distress  EYES: Eyes grossly normal to inspection, and conjunctivae and sclerae normal  HENT: head normocephalic and atraumatic and mouth without ulcers or lesions, oropharynx clear and oral mucous membranes moist  NECK: no noticeable adenopathy, no asymmetry, masses, or  scars   RESP: lungs clear to auscultation - no rales, rhonchi or wheezes  CV: regular rate and rhythm, normal S1 S2, no S3 or S4, no murmur, click or rub, no peripheral edema and peripheral pulses strong  ABDOMEN: soft, nontender, no hepatosplenomegaly, no masses and bowel sounds normal  MS: no musculoskeletal defects are noted and gait is age appropriate without ataxia; no neck tenderness  SKIN: no suspicious lesions or rashes  NEURO: mentation intact and speech normal  PSYCH: mentation appears normal and affect normal/bright.     Results for orders placed or performed in visit on 08/21/17   Basic metabolic panel   Result Value Ref Range    Sodium 140 133 - 144 mmol/L    Potassium 3.8 3.4 - 5.3 mmol/L    Chloride 106 94 - 109 mmol/L    Carbon Dioxide 30 20 - 32 mmol/L    Anion Gap 4 3 - 14 mmol/L    Glucose 103 (H) 70 - 99 mg/dL    Urea Nitrogen 17 7 - 30 mg/dL    Creatinine 0.58 0.52 - 1.04 mg/dL    GFR Estimate >90 >60 mL/min/1.7m2    GFR Estimate If Black >90 >60 mL/min/1.7m2    Calcium 9.2 8.5 - 10.1 mg/dL        ASSESSMENT/PLAN:                                                        ICD-10-CM    1. Benign essential hypertension I10 Basic metabolic panel     Basic metabolic panel     metoprolol (TOPROL-XL) 25 MG 24 hr tablet   2. Episodic tension-type headache, not intractable G44.219 metoprolol (TOPROL-XL) 25 MG 24 hr tablet     (I10) Benign essential hypertension  (primary encounter diagnosis)  Comment: controlled, asympt-doubt the headaches are caused by her now modestly elevated BPs  Plan: Basic metabolic panel, metoprolol (TOPROL-XL)         25 MG 24 hr tablet        As per orders above and patient instructions below.     (G44.219) Episodic tension-type headache, not intractable  Comment: differential diagnosis: tension +/- migraine; vs s/e to the diltiazem  Plan: metoprolol (TOPROL-XL) 25 MG 24 hr tablet       As per orders above and patient instructions below.      Patient Instructions     For your blood  pressure:  Please start taking metoprolol and continue diltiazem.  Continue your healthy diet habits.  See tips below for the DASH diet.     We will let you know your test results as discussed: by phone for urgent results, otherwise by postal mail.  We do advise yearly eye doctor appointments for patients with a history of high blood pressure. Keep your upcoming appointment today, with the eye doctor. Ask them to send a copy of their note to us.    For your headaches:  The metoprolol which we will add today, to your diltiazem should help with your headaches.  Please see below for more tips regarding headaches.    Please return to clinic in 2 months to recheck your blood pressure and your headaches.        Tension Headaches     Massaging your neck muscles can help relieve tension headache pain.     Tension headaches cause a dull, steady pain on both sides of the head and in the neck and the back of the head. The eyes may also feel tired. Tension headaches can be triggered by muscle tension and clenching, lack of sleep, poor posture, eyestrain, stress,depression, anxiety, arthritis of the neck, and other factors.  To help prevent tension headaches  Take the following steps:     Make sure your work area is properly set up to help you avoid neck strain and eyestrain.    Make sure that your eyeglass prescription is current and is appropriate for the work you do.    Learn techniques for relaxing and reducing emotional stress. These include deep breathing, progressive relaxation, yoga, meditation, and biofeedback.    Maintain a regular exercise regimen under the guidance of a doctor. This can help keep your neck and back flexible, strong, and relaxed.  To relieve the pain  Take the following steps:    Use moist heat to relax the muscles. Soak in a hot bath or wrap a warm, moist towel around your neck.    Brush your scalp lightly with a soft hairbrush.    Give yourself a massage. Knead the muscles running from your  shoulders up the back of your skull.    Use an ice pack. Apply this directly to the place where you feel pain.    Rest. Sleeping often helps relieve headache pain.    Drink plenty of fluids. Dehydration is another trigger for headaches.    Use pain medicine sparingly for moderate to severe pain.   Date Last Reviewed: 10/19/2015    1095-9505 [employer]. [address] [place] [address], [place] [address]. All rights reserved. This information is not intended as a substitute for professional medical care. Always follow your healthcare professional's instructions.        Self-Care for Headaches  Most headaches aren't serious and can be relieved with self-care. But some headaches may be a sign of another health problem like eye trouble or high blood pressure. To find the best treatment, learn what kind of headaches you get. For tension headaches, self-care will usually help. To treat migraines, ask your healthcare provider for advice. It is also possible to get both tension and migraine headaches. Self-care involves relieving the pain and avoiding headache  triggers  if you can.    Ways to reduce pain and tension  Try these steps:    Apply a cold compress or ice pack to the pain site.    Drink fluids. If nausea makes it hard to drink, try sucking on ice.    Rest. Protect yourself from bright light and loud noises.    Calm your emotions by imagining a peaceful scene.    Massage tight neck, shoulder, and head muscles.    To relax muscles, soak in a hot bath or use a hot shower.  Use medicines  Aspirin or aspirin substitutes, such as ibuprofen and acetaminophen, can relieve headache. Remember: Never give aspirin to anyone 18 years old or younger because of the risk of developing Reye syndrome. Use pain medicines only when necessary.  Track your headaches  Keeping a headache diary can help you and your healthcare provider identify what's causing your headaches:    Note when each headache happens.    Identify your  "activities and the foods you've eaten 6 to 8 hours before the headache began.    Look for any trends or \"triggers.\"  Signs of tension headache  Any of the following can be signs:    Dull pain or feeling of pressure in a tight band around your head    Pain in your neck or shoulders    Headache without a definite beginning or end    Headache after an activity such as driving or working on a computer  Signs of migraine  Any of the following can be signs:    Throbbing pain on one or both sides of your head    Nausea or vomiting    Extreme sensitivity to light, sound, and smells    Bright spots, flashes, or other visual changes    Pain or nausea so severe that you can't continue your daily activities  Call your healthcare provider   If you have any of the following symptoms, contact your healthcare provider:    A headache that lingers after a recent injury or bump to the head.    A fever with a stiff neck or pain when you bend your head toward your chest.    A headache along with slurred speech, changes in your vision, or numbness or weakness in your arms or legs.    A headache for longer than 3 days.    Frequent headaches, especially in the morning.    Headaches with seizures     Seek immediate medical attention if you have a headache that you would call \"the worst headache you have ever had.\"   Date Last Reviewed: 10/4/2015    1716-1513 [employer]. [address] [place] [address], [place] [address]. All rights reserved. This information is not intended as a substitute for professional medical care. Always follow your healthcare professional's instructions.        * Tension Headache    Muscle Tension Headache (also called \"stress headache\") is a very common cause of head pain. Under stress, some people tense the muscles of their shoulder, neck and scalp without knowing it. If this lasts long enough, a headache can occur. These headaches can be very painful and last for hours or even days.  Home Care:    If you " were given pain medicine for this headache, do not drive yourself home. Arrange for a ride, instead. When you get home, try to sleep. You should feel much better when you wake up.    Heat to the back of your neck may relieve neck spasm.    Drink only clear liquids or eat a very light diet to avoid nausea/vomiting until symptoms improve.  Preventing Future Headaches    Identify the sources of stress in your life. These may not be obvious! Learn new ways to handle your stress, such as regular exercise, biofeedback, self-hypnosis and meditation. For more information about this, consult your doctor or go to a local bookstore and review the many books and tapes on this subject.    At the first sign of a tension headache, take time out if possible. Remove yourself from the stressful situation, find a quiet comfortable place to sit or lie down and let yourself relax. Heat and deep massage of the tight areas in the neck and shoulders may help reduce muscle spasm. Medicine, such as ibuprofen (Advil or Motrin) or a prescribed muscle relaxant may be helpful at this point.  Follow Up with your doctor if the headache is not better within the next 24 hours. If you have frequent headaches you should discuss a treatment plan with your primary care doctor. Ask if you can have medicine to take at home the next time you get a bad headache. This may avoid the need for a visit to the emergency department in the future. Poorly controlled chronic headaches may require a referral to a neurologist (headache specialist).  Call your Doctor Or Get Prompt Medical Attention if any of the following occur:    Worsening of your head pain or no improvement within 24 hours    Repeated vomiting (unable to keep liquids down)    Fever of 100.4 F (38.0 C) or higher, or as directed by your healthcare provider    Stiff neck    Extreme drowsiness, confusion or fainting    Dizziness, vertigo (dizziness with spinning sensation)    Weakness of an arm or leg or  one side of the face    Difficulty with speech or vision    8170-8147 [employer], [address] [facility], [place] [address]. All rights reserved. This information is not intended as a substitute for professional medical care. Always follow your healthcare professional's instructions.                                       Dietary Approaches to Stop Hypertension (The DASH Diet)   What is hypertension?   Hypertension is blood pressure that keeps being higher than normal. Blood pressure is the force of blood against artery walls as the heart pumps blood through the body. Blood pressure can be unhealthy if it is above 120/80. The higher your blood pressure, the greater the health risk.   High blood pressure can be controlled if you take these steps:   Maintain a healthy weight.   Are physically active.   Follow a healthy eating plan, which includes foods that do not have a lot of salt and sodium.   Do not drink a lot of alcohol.   Diet affects high blood pressure. Following the DASH diet and reducing the amount of sodium in your diet will help lower your blood pressure. It will also help prevent high blood pressure.   What is the DASH diet?   Dietary Approaches to Stop Hypertension (DASH) is a diet that is low in saturated fat, cholesterol, and total fat. It emphasizes fruits, vegetables, and low-fat dairy foods. The DASH diet also includes whole-grain products, fish, poultry, and nuts. It encourages fewer servings of red meat, sweets, and sugar-containing beverages. It is rich in magnesium, potassium, and calcium, as well as protein and fiber.   How do I get started on the DASH diet?   The DASH diet requires no special foods and has no hard-to-follow recipes. Start by seeing how DASH compares with your current eating habits.   The DASH eating plan illustrated below is based on a diet of 2,000 calories a day. Your healthcare provider or a dietitian can help you determine how many calories a day you need. Most adults  need somewhere between 1600 and 2800 calories a day. Serving sizes for different foods vary from 1/2 cup to 1 and 1/4 cups. Check product nutrition labels for serving sizes and the number of calories per serving.                      Number of        Examples of  Food Group      servings         serving size  ----------------------------------------------------------------    Grains and      7 to 8 per day   1 slice of bread  grain products                   1 cup ready-to-eat cold cereal                                   1/2 cup cooked rice, pasta,                                   or cereal    Vegetables      4 to 5 per day   1 cup raw leafy vegetable                                   1/2 cup cooked vegetable                                   6 ounces (oz) vegetable juice      Fruits          4 to 5 per day   1 medium fruit                                   1/4 cup dried fruit                                   1/2 cup fresh, frozen, or                                   canned fruit                                   6 oz fruit juice      Low-fat or      2 to 3 per day   8 oz milk  fat-free                         1 cup yogurt  dairy foods                      1 and 1/2 oz cheese    Lean meats,  poultry,        2 or fewer per   3 oz cooked lean meat,  or fish         day              skinless poultry, or fish    Nuts, seeds,    4 to 5 per week  1/3 cup or 1 and 1/2 oz nuts  and dry beans                    1 tablespoon or 1/2 oz seeds                                   1/2 cup cooked dry beans    Fats and oils   2 to 3 per day   1 teaspoon soft margarine                                   1 tablespoon low-fat mayonnaise                                   2 tablespoons light salad                                   dressing                                   1 teaspoon vegetable oil    Sweets          5 per week       1 tablespoon sugar                                   1 tablespoon jelly or jam                                    1/2 oz jelly beans                                   8 oz lemonade  ----------------------------------------------------------------  Make changes gradually. Here are some suggestions that might help:   If you now eat 1 or 2 servings of vegetables a day, add a serving at lunch and another at dinner.   If you have not been eating fruit regularly, or have only juice at breakfast, add a serving to your meals or have it as a snack.   Drink milk or water with lunch or dinner instead of soda, sugar-sweetened tea, or alcohol. Choose low-fat (1%) or fat-free (nonfat) dairy products so that you are eating fewer calories and less saturated fat, total fat, and cholesterol.   Read food labels on margarines and salad dressings to choose products lowest in fat.   If you now eat large portions of meat, slowly cut back--by a half or a third at each meal. Limit meat to 6 ounces a day (two 3-ounce servings). Three to 4 ounces is about the size of a deck of cards.   Have 2 or more meatless meals each week. Increase servings of vegetables, rice, pasta, and beans in all meals. Try casseroles, pasta, and stir-de paz dishes, which have less meat and more vegetables, grains, and beans.   Use fruits canned in their own juice. Fresh fruits require little or no preparation. Dried fruits are a good choice to carry with you or to have ready in the car.   Try these snacks ideas: unsalted pretzels or nuts mixed with raisins, afshan crackers, low-fat and fat-free yogurt or frozen yogurt, popcorn with no salt or butter added, and raw vegetables.   Choose whole-grain foods to get more nutrients, including minerals and fiber. For example, choose whole-wheat bread, whole-grain cereals, or brown rice.   Use fresh, frozen, or no-salt-added canned vegetables.   Remember to also reduce the salt and sodium in your diet. Try to have no more than 2000 milligrams (mg) of sodium per day, with a goal of further reducing the sodium to 1500 mg per day. Three  important ways to reduce sodium are:   Eat food products with reduced-sodium or no salt added.   Use less salt when you prepare foods and do not add salt to your food at the table.   Read food labels. Aim for foods that contain less than 5% of the daily value of sodium.   The DASH eating plan is not designed for weight loss. But it contains many lower-calorie foods, such as fruits and vegetables. You can make it lower in calories by replacing high-calorie foods with more fruits and vegetables. Some ideas to increase fruits and vegetables and decrease calories include:   Eat a medium apple instead of 4 shortbread cookies. You'll save 80 calories.   Eat 1/4 cup of dried apricots instead of a 2-ounce bag of pork rinds. You'll save 230 calories.   Have a hamburger that's 3 ounces instead of 6 ounces. Add a 1/2 cup serving of carrots and a 1/2 cup serving of spinach. You'll save more than 200 calories.   Instead of 5 ounces of chicken, have a stir de paz with 2 ounces of chicken and 1 and 1/2 cups of raw vegetables. Use just a small amount of vegetable oil. You'll save 50 calories.   Have a 1/2 cup serving of low-fat frozen                                 Dietary Approaches to Stop Hypertension (The DASH Diet)   What is hypertension?   Hypertension is blood pressure that keeps being higher than normal. Blood pressure is the force of blood against artery walls as the heart pumps blood through the body. Blood pressure can be unhealthy if it is above 120/80. The higher your blood pressure, the greater the health risk.   High blood pressure can be controlled if you take these steps:   Maintain a healthy weight.   Are physically active.   Follow a healthy eating plan, which includes foods that do not have a lot of salt and sodium.   Do not drink a lot of alcohol.   Diet affects high blood pressure. Following the DASH diet and reducing the amount of sodium in your diet will help lower your blood pressure. It will also help  prevent high blood pressure.   What is the DASH diet?   Dietary Approaches to Stop Hypertension (DASH) is a diet that is low in saturated fat, cholesterol, and total fat. It emphasizes fruits, vegetables, and low-fat dairy foods. The DASH diet also includes whole-grain products, fish, poultry, and nuts. It encourages fewer servings of red meat, sweets, and sugar-containing beverages. It is rich in magnesium, potassium, and calcium, as well as protein and fiber.   How do I get started on the DASH diet?   The DASH diet requires no special foods and has no hard-to-follow recipes. Start by seeing how DASH compares with your current eating habits.   The DASH eating plan illustrated below is based on a diet of 2,000 calories a day. Your healthcare provider or a dietitian can help you determine how many calories a day you need. Most adults need somewhere between 1600 and 2800 calories a day. Serving sizes for different foods vary from 1/2 cup to 1 and 1/4 cups. Check product nutrition labels for serving sizes and the number of calories per serving.                      Number of        Examples of  Food Group      servings         serving size  ----------------------------------------------------------------    Grains and      7 to 8 per day   1 slice of bread  grain products                   1 cup ready-to-eat cold cereal                                   1/2 cup cooked rice, pasta,                                   or cereal    Vegetables      4 to 5 per day   1 cup raw leafy vegetable                                   1/2 cup cooked vegetable                                   6 ounces (oz) vegetable juice      Fruits          4 to 5 per day   1 medium fruit                                   1/4 cup dried fruit                                   1/2 cup fresh, frozen, or                                   canned fruit                                   6 oz fruit juice      Low-fat or      2 to 3 per day   8 oz  milk  fat-free                         1 cup yogurt  dairy foods                      1 and 1/2 oz cheese    Lean meats,  poultry,        2 or fewer per   3 oz cooked lean meat,  or fish         day              skinless poultry, or fish    Nuts, seeds,    4 to 5 per week  1/3 cup or 1 and 1/2 oz nuts  and dry beans                    1 tablespoon or 1/2 oz seeds                                   1/2 cup cooked dry beans    Fats and oils   2 to 3 per day   1 teaspoon soft margarine                                   1 tablespoon low-fat mayonnaise                                   2 tablespoons light salad                                   dressing                                   1 teaspoon vegetable oil    Sweets          5 per week       1 tablespoon sugar                                   1 tablespoon jelly or jam                                   1/2 oz jelly beans                                   8 oz lemonade  ----------------------------------------------------------------  Make changes gradually. Here are some suggestions that might help:   If you now eat 1 or 2 servings of vegetables a day, add a serving at lunch and another at dinner.   If you have not been eating fruit regularly, or have only juice at breakfast, add a serving to your meals or have it as a snack.   Drink milk or water with lunch or dinner instead of soda, sugar-sweetened tea, or alcohol. Choose low-fat (1%) or fat-free (nonfat) dairy products so that you are eating fewer calories and less saturated fat, total fat, and cholesterol.   Read food labels on margarines and salad dressings to choose products lowest in fat.   If you now eat large portions of meat, slowly cut back--by a half or a third at each meal. Limit meat to 6 ounces a day (two 3-ounce servings). Three to 4 ounces is about the size of a deck of cards.   Have 2 or more meatless meals each week. Increase servings of vegetables, rice, pasta, and beans in all meals. Try  casseroles, pasta, and stir-de paz dishes, which have less meat and more vegetables, grains, and beans.   Use fruits canned in their own juice. Fresh fruits require little or no preparation. Dried fruits are a good choice to carry with you or to have ready in the car.   Try these snacks ideas: unsalted pretzels or nuts mixed with raisins, afshan crackers, low-fat and fat-free yogurt or frozen yogurt, popcorn with no salt or butter added, and raw vegetables.   Choose whole-grain foods to get more nutrients, including minerals and fiber. For example, choose whole-wheat bread, whole-grain cereals, or brown rice.   Use fresh, frozen, or no-salt-added canned vegetables.   Remember to also reduce the salt and sodium in your diet. Try to have no more than 2000 milligrams (mg) of sodium per day, with a goal of further reducing the sodium to 1500 mg per day. Three important ways to reduce sodium are:   Eat food products with reduced-sodium or no salt added.   Use less salt when you prepare foods and do not add salt to your food at the table.   Read food labels. Aim for foods that contain less than 5% of the daily value of sodium.   The DASH eating plan is not designed for weight loss. But it contains many lower-calorie foods, such as fruits and vegetables. You can make it lower in calories by replacing high-calorie foods with more fruits and vegetables. Some ideas to increase fruits and vegetables and decrease calories include:   Eat a medium apple instead of 4 shortbread cookies. You'll save 80 calories.   Eat 1/4 cup of dried apricots instead of a 2-ounce bag of pork rinds. You'll save 230 calories.   Have a hamburger that's 3 ounces instead of 6 ounces. Add a 1/2 cup serving of carrots and a 1/2 cup serving of spinach. You'll save more than 200 calories.   Instead of 5 ounces of chicken, have a stir de paz with 2 ounces of chicken and 1 and 1/2 cups of raw vegetables. Use just a small amount of vegetable oil. You'll save 50  calories.   Have a 1/2 cup serving of low-fat frozen yogurt instead of a 1-and-1/2-ounce chocolate bar. You'll save about 110 calories.   Use low-fat or fat-free condiments, such as fat-free salad dressings.   Eat smaller portions. Cut back gradually.   Use food labels to compare fat and calorie content in packaged foods. Items marked low fat or fat free may be lower in fat but not lower in calories than their regular versions.   Limit foods with lots of added sugar, such as pies, flavored yogurts, candy bars, ice cream, sherbet, regular soft drinks, and fruit drinks.   Drink water or club soda instead of cola or other soda drinks.     For more information, see the National [employer], [employer], [employer] [place] Web site at: http://www.nhlbi.nih.gov/health/public/heart/hbp/dash/.  yogurt instead of a 1-and-1/2-ounce chocolate bar. You'll save about 110 calories.   Use low-fat or fat-free condiments, such as fat-free salad dressings.   Eat smaller portions. Cut back gradually.   Use food labels to compare fat and calorie content in packaged foods. Items marked low fat or fat free may be lower in fat but not lower in calories than their regular versions.   Limit foods with lots of added sugar, such as pies, flavored yogurts, candy bars, ice cream, sherbet, regular soft drinks, and fruit drinks.   Drink water or club soda instead of cola or other soda drinks.     For more information, see the National Heart, Lung, and [employer] [place] Web site at: http://www.nhlbi.nih.gov/health/public/heart/hbp/dash/.                         Marina Crandall MD  [facility]

## 2017-08-21 NOTE — PATIENT INSTRUCTIONS
For your blood pressure:  Please start taking metoprolol and continue diltiazem.  Continue your healthy diet habits.  See tips below for the DASH diet.     We will let you know your test results as discussed: by phone for urgent results, otherwise by postal mail.  We do advise yearly eye doctor appointments for patients with a history of high blood pressure. Keep your upcoming appointment today, with the eye doctor. Ask them to send a copy of their note to us.    For your headaches:  The metoprolol which we will add today, to your diltiazem should help with your headaches.  Please see below for more tips regarding headaches.    Please return to clinic in 2 months to recheck your blood pressure and your headaches.        Tension Headaches     Massaging your neck muscles can help relieve tension headache pain.     Tension headaches cause a dull, steady pain on both sides of the head and in the neck and the back of the head. The eyes may also feel tired. Tension headaches can be triggered by muscle tension and clenching, lack of sleep, poor posture, eyestrain, stress,depression, anxiety, arthritis of the neck, and other factors.  To help prevent tension headaches  Take the following steps:     Make sure your work area is properly set up to help you avoid neck strain and eyestrain.    Make sure that your eyeglass prescription is current and is appropriate for the work you do.    Learn techniques for relaxing and reducing emotional stress. These include deep breathing, progressive relaxation, yoga, meditation, and biofeedback.    Maintain a regular exercise regimen under the guidance of a doctor. This can help keep your neck and back flexible, strong, and relaxed.  To relieve the pain  Take the following steps:    Use moist heat to relax the muscles. Soak in a hot bath or wrap a warm, moist towel around your neck.    Brush your scalp lightly with a soft hairbrush.    Give yourself a massage. Knead the muscles running  from your shoulders up the back of your skull.    Use an ice pack. Apply this directly to the place where you feel pain.    Rest. Sleeping often helps relieve headache pain.    Drink plenty of fluids. Dehydration is another trigger for headaches.    Use pain medicine sparingly for moderate to severe pain.   Date Last Reviewed: 10/19/2015    0850-1629 [employer]. [address] [place] [address], [place] [address]. All rights reserved. This information is not intended as a substitute for professional medical care. Always follow your healthcare professional's instructions.        Self-Care for Headaches  Most headaches aren't serious and can be relieved with self-care. But some headaches may be a sign of another health problem like eye trouble or high blood pressure. To find the best treatment, learn what kind of headaches you get. For tension headaches, self-care will usually help. To treat migraines, ask your healthcare provider for advice. It is also possible to get both tension and migraine headaches. Self-care involves relieving the pain and avoiding headache  triggers  if you can.    Ways to reduce pain and tension  Try these steps:    Apply a cold compress or ice pack to the pain site.    Drink fluids. If nausea makes it hard to drink, try sucking on ice.    Rest. Protect yourself from bright light and loud noises.    Calm your emotions by imagining a peaceful scene.    Massage tight neck, shoulder, and head muscles.    To relax muscles, soak in a hot bath or use a hot shower.  Use medicines  Aspirin or aspirin substitutes, such as ibuprofen and acetaminophen, can relieve headache. Remember: Never give aspirin to anyone 18 years old or younger because of the risk of developing Reye syndrome. Use pain medicines only when necessary.  Track your headaches  Keeping a headache diary can help you and your healthcare provider identify what's causing your headaches:    Note when each headache happens.    Identify  "your activities and the foods you've eaten 6 to 8 hours before the headache began.    Look for any trends or \"triggers.\"  Signs of tension headache  Any of the following can be signs:    Dull pain or feeling of pressure in a tight band around your head    Pain in your neck or shoulders    Headache without a definite beginning or end    Headache after an activity such as driving or working on a computer  Signs of migraine  Any of the following can be signs:    Throbbing pain on one or both sides of your head    Nausea or vomiting    Extreme sensitivity to light, sound, and smells    Bright spots, flashes, or other visual changes    Pain or nausea so severe that you can't continue your daily activities  Call your healthcare provider   If you have any of the following symptoms, contact your healthcare provider:    A headache that lingers after a recent injury or bump to the head.    A fever with a stiff neck or pain when you bend your head toward your chest.    A headache along with slurred speech, changes in your vision, or numbness or weakness in your arms or legs.    A headache for longer than 3 days.    Frequent headaches, especially in the morning.    Headaches with seizures     Seek immediate medical attention if you have a headache that you would call \"the worst headache you have ever had.\"   Date Last Reviewed: 10/4/2015    4692-5098 [employer]. [address] [place] [address], [place] [address]. All rights reserved. This information is not intended as a substitute for professional medical care. Always follow your healthcare professional's instructions.        * Tension Headache    Muscle Tension Headache (also called \"stress headache\") is a very common cause of head pain. Under stress, some people tense the muscles of their shoulder, neck and scalp without knowing it. If this lasts long enough, a headache can occur. These headaches can be very painful and last for hours or even days.  Home Care:    If " you were given pain medicine for this headache, do not drive yourself home. Arrange for a ride, instead. When you get home, try to sleep. You should feel much better when you wake up.    Heat to the back of your neck may relieve neck spasm.    Drink only clear liquids or eat a very light diet to avoid nausea/vomiting until symptoms improve.  Preventing Future Headaches    Identify the sources of stress in your life. These may not be obvious! Learn new ways to handle your stress, such as regular exercise, biofeedback, self-hypnosis and meditation. For more information about this, consult your doctor or go to a local bookstore and review the many books and tapes on this subject.    At the first sign of a tension headache, take time out if possible. Remove yourself from the stressful situation, find a quiet comfortable place to sit or lie down and let yourself relax. Heat and deep massage of the tight areas in the neck and shoulders may help reduce muscle spasm. Medicine, such as ibuprofen (Advil or Motrin) or a prescribed muscle relaxant may be helpful at this point.  Follow Up with your doctor if the headache is not better within the next 24 hours. If you have frequent headaches you should discuss a treatment plan with your primary care doctor. Ask if you can have medicine to take at home the next time you get a bad headache. This may avoid the need for a visit to the emergency department in the future. Poorly controlled chronic headaches may require a referral to a neurologist (headache specialist).  Call your Doctor Or Get Prompt Medical Attention if any of the following occur:    Worsening of your head pain or no improvement within 24 hours    Repeated vomiting (unable to keep liquids down)    Fever of 100.4 F (38.0 C) or higher, or as directed by your healthcare provider    Stiff neck    Extreme drowsiness, confusion or fainting    Dizziness, vertigo (dizziness with spinning sensation)    Weakness of an arm or  leg or one side of the face    Difficulty with speech or vision    3898-6406 [employer], [address] [facility], [place] [address]. All rights reserved. This information is not intended as a substitute for professional medical care. Always follow your healthcare professional's instructions.                                       Dietary Approaches to Stop Hypertension (The DASH Diet)   What is hypertension?   Hypertension is blood pressure that keeps being higher than normal. Blood pressure is the force of blood against artery walls as the heart pumps blood through the body. Blood pressure can be unhealthy if it is above 120/80. The higher your blood pressure, the greater the health risk.   High blood pressure can be controlled if you take these steps:   Maintain a healthy weight.   Are physically active.   Follow a healthy eating plan, which includes foods that do not have a lot of salt and sodium.   Do not drink a lot of alcohol.   Diet affects high blood pressure. Following the DASH diet and reducing the amount of sodium in your diet will help lower your blood pressure. It will also help prevent high blood pressure.   What is the DASH diet?   Dietary Approaches to Stop Hypertension (DASH) is a diet that is low in saturated fat, cholesterol, and total fat. It emphasizes fruits, vegetables, and low-fat dairy foods. The DASH diet also includes whole-grain products, fish, poultry, and nuts. It encourages fewer servings of red meat, sweets, and sugar-containing beverages. It is rich in magnesium, potassium, and calcium, as well as protein and fiber.   How do I get started on the DASH diet?   The DASH diet requires no special foods and has no hard-to-follow recipes. Start by seeing how DASH compares with your current eating habits.   The DASH eating plan illustrated below is based on a diet of 2,000 calories a day. Your healthcare provider or a dietitian can help you determine how many calories a day you need. Most  adults need somewhere between 1600 and 2800 calories a day. Serving sizes for different foods vary from 1/2 cup to 1 and 1/4 cups. Check product nutrition labels for serving sizes and the number of calories per serving.                      Number of        Examples of  Food Group      servings         serving size  ----------------------------------------------------------------    Grains and      7 to 8 per day   1 slice of bread  grain products                   1 cup ready-to-eat cold cereal                                   1/2 cup cooked rice, pasta,                                   or cereal    Vegetables      4 to 5 per day   1 cup raw leafy vegetable                                   1/2 cup cooked vegetable                                   6 ounces (oz) vegetable juice      Fruits          4 to 5 per day   1 medium fruit                                   1/4 cup dried fruit                                   1/2 cup fresh, frozen, or                                   canned fruit                                   6 oz fruit juice      Low-fat or      2 to 3 per day   8 oz milk  fat-free                         1 cup yogurt  dairy foods                      1 and 1/2 oz cheese    Lean meats,  poultry,        2 or fewer per   3 oz cooked lean meat,  or fish         day              skinless poultry, or fish    Nuts, seeds,    4 to 5 per week  1/3 cup or 1 and 1/2 oz nuts  and dry beans                    1 tablespoon or 1/2 oz seeds                                   1/2 cup cooked dry beans    Fats and oils   2 to 3 per day   1 teaspoon soft margarine                                   1 tablespoon low-fat mayonnaise                                   2 tablespoons light salad                                   dressing                                   1 teaspoon vegetable oil    Sweets          5 per week       1 tablespoon sugar                                   1 tablespoon jelly or jam                                    1/2 oz jelly beans                                   8 oz lemonade  ----------------------------------------------------------------  Make changes gradually. Here are some suggestions that might help:   If you now eat 1 or 2 servings of vegetables a day, add a serving at lunch and another at dinner.   If you have not been eating fruit regularly, or have only juice at breakfast, add a serving to your meals or have it as a snack.   Drink milk or water with lunch or dinner instead of soda, sugar-sweetened tea, or alcohol. Choose low-fat (1%) or fat-free (nonfat) dairy products so that you are eating fewer calories and less saturated fat, total fat, and cholesterol.   Read food labels on margarines and salad dressings to choose products lowest in fat.   If you now eat large portions of meat, slowly cut back--by a half or a third at each meal. Limit meat to 6 ounces a day (two 3-ounce servings). Three to 4 ounces is about the size of a deck of cards.   Have 2 or more meatless meals each week. Increase servings of vegetables, rice, pasta, and beans in all meals. Try casseroles, pasta, and stir-de paz dishes, which have less meat and more vegetables, grains, and beans.   Use fruits canned in their own juice. Fresh fruits require little or no preparation. Dried fruits are a good choice to carry with you or to have ready in the car.   Try these snacks ideas: unsalted pretzels or nuts mixed with raisins, afshan crackers, low-fat and fat-free yogurt or frozen yogurt, popcorn with no salt or butter added, and raw vegetables.   Choose whole-grain foods to get more nutrients, including minerals and fiber. For example, choose whole-wheat bread, whole-grain cereals, or brown rice.   Use fresh, frozen, or no-salt-added canned vegetables.   Remember to also reduce the salt and sodium in your diet. Try to have no more than 2000 milligrams (mg) of sodium per day, with a goal of further reducing the sodium to 1500 mg per day.  Three important ways to reduce sodium are:   Eat food products with reduced-sodium or no salt added.   Use less salt when you prepare foods and do not add salt to your food at the table.   Read food labels. Aim for foods that contain less than 5% of the daily value of sodium.   The DASH eating plan is not designed for weight loss. But it contains many lower-calorie foods, such as fruits and vegetables. You can make it lower in calories by replacing high-calorie foods with more fruits and vegetables. Some ideas to increase fruits and vegetables and decrease calories include:   Eat a medium apple instead of 4 shortbread cookies. You'll save 80 calories.   Eat 1/4 cup of dried apricots instead of a 2-ounce bag of pork rinds. You'll save 230 calories.   Have a hamburger that's 3 ounces instead of 6 ounces. Add a 1/2 cup serving of carrots and a 1/2 cup serving of spinach. You'll save more than 200 calories.   Instead of 5 ounces of chicken, have a stir de paz with 2 ounces of chicken and 1 and 1/2 cups of raw vegetables. Use just a small amount of vegetable oil. You'll save 50 calories.   Have a 1/2 cup serving of low-fat frozen                                 Dietary Approaches to Stop Hypertension (The DASH Diet)   What is hypertension?   Hypertension is blood pressure that keeps being higher than normal. Blood pressure is the force of blood against artery walls as the heart pumps blood through the body. Blood pressure can be unhealthy if it is above 120/80. The higher your blood pressure, the greater the health risk.   High blood pressure can be controlled if you take these steps:   Maintain a healthy weight.   Are physically active.   Follow a healthy eating plan, which includes foods that do not have a lot of salt and sodium.   Do not drink a lot of alcohol.   Diet affects high blood pressure. Following the DASH diet and reducing the amount of sodium in your diet will help lower your blood pressure. It will also help  prevent high blood pressure.   What is the DASH diet?   Dietary Approaches to Stop Hypertension (DASH) is a diet that is low in saturated fat, cholesterol, and total fat. It emphasizes fruits, vegetables, and low-fat dairy foods. The DASH diet also includes whole-grain products, fish, poultry, and nuts. It encourages fewer servings of red meat, sweets, and sugar-containing beverages. It is rich in magnesium, potassium, and calcium, as well as protein and fiber.   How do I get started on the DASH diet?   The DASH diet requires no special foods and has no hard-to-follow recipes. Start by seeing how DASH compares with your current eating habits.   The DASH eating plan illustrated below is based on a diet of 2,000 calories a day. Your healthcare provider or a dietitian can help you determine how many calories a day you need. Most adults need somewhere between 1600 and 2800 calories a day. Serving sizes for different foods vary from 1/2 cup to 1 and 1/4 cups. Check product nutrition labels for serving sizes and the number of calories per serving.                      Number of        Examples of  Food Group      servings         serving size  ----------------------------------------------------------------    Grains and      7 to 8 per day   1 slice of bread  grain products                   1 cup ready-to-eat cold cereal                                   1/2 cup cooked rice, pasta,                                   or cereal    Vegetables      4 to 5 per day   1 cup raw leafy vegetable                                   1/2 cup cooked vegetable                                   6 ounces (oz) vegetable juice      Fruits          4 to 5 per day   1 medium fruit                                   1/4 cup dried fruit                                   1/2 cup fresh, frozen, or                                   canned fruit                                   6 oz fruit juice      Low-fat or      2 to 3 per day   8 oz  milk  fat-free                         1 cup yogurt  dairy foods                      1 and 1/2 oz cheese    Lean meats,  poultry,        2 or fewer per   3 oz cooked lean meat,  or fish         day              skinless poultry, or fish    Nuts, seeds,    4 to 5 per week  1/3 cup or 1 and 1/2 oz nuts  and dry beans                    1 tablespoon or 1/2 oz seeds                                   1/2 cup cooked dry beans    Fats and oils   2 to 3 per day   1 teaspoon soft margarine                                   1 tablespoon low-fat mayonnaise                                   2 tablespoons light salad                                   dressing                                   1 teaspoon vegetable oil    Sweets          5 per week       1 tablespoon sugar                                   1 tablespoon jelly or jam                                   1/2 oz jelly beans                                   8 oz lemonade  ----------------------------------------------------------------  Make changes gradually. Here are some suggestions that might help:   If you now eat 1 or 2 servings of vegetables a day, add a serving at lunch and another at dinner.   If you have not been eating fruit regularly, or have only juice at breakfast, add a serving to your meals or have it as a snack.   Drink milk or water with lunch or dinner instead of soda, sugar-sweetened tea, or alcohol. Choose low-fat (1%) or fat-free (nonfat) dairy products so that you are eating fewer calories and less saturated fat, total fat, and cholesterol.   Read food labels on margarines and salad dressings to choose products lowest in fat.   If you now eat large portions of meat, slowly cut back--by a half or a third at each meal. Limit meat to 6 ounces a day (two 3-ounce servings). Three to 4 ounces is about the size of a deck of cards.   Have 2 or more meatless meals each week. Increase servings of vegetables, rice, pasta, and beans in all meals. Try  casseroles, pasta, and stir-de paz dishes, which have less meat and more vegetables, grains, and beans.   Use fruits canned in their own juice. Fresh fruits require little or no preparation. Dried fruits are a good choice to carry with you or to have ready in the car.   Try these snacks ideas: unsalted pretzels or nuts mixed with raisins, afshan crackers, low-fat and fat-free yogurt or frozen yogurt, popcorn with no salt or butter added, and raw vegetables.   Choose whole-grain foods to get more nutrients, including minerals and fiber. For example, choose whole-wheat bread, whole-grain cereals, or brown rice.   Use fresh, frozen, or no-salt-added canned vegetables.   Remember to also reduce the salt and sodium in your diet. Try to have no more than 2000 milligrams (mg) of sodium per day, with a goal of further reducing the sodium to 1500 mg per day. Three important ways to reduce sodium are:   Eat food products with reduced-sodium or no salt added.   Use less salt when you prepare foods and do not add salt to your food at the table.   Read food labels. Aim for foods that contain less than 5% of the daily value of sodium.   The DASH eating plan is not designed for weight loss. But it contains many lower-calorie foods, such as fruits and vegetables. You can make it lower in calories by replacing high-calorie foods with more fruits and vegetables. Some ideas to increase fruits and vegetables and decrease calories include:   Eat a medium apple instead of 4 shortbread cookies. You'll save 80 calories.   Eat 1/4 cup of dried apricots instead of a 2-ounce bag of pork rinds. You'll save 230 calories.   Have a hamburger that's 3 ounces instead of 6 ounces. Add a 1/2 cup serving of carrots and a 1/2 cup serving of spinach. You'll save more than 200 calories.   Instead of 5 ounces of chicken, have a stir de paz with 2 ounces of chicken and 1 and 1/2 cups of raw vegetables. Use just a small amount of vegetable oil. You'll save 50  calories.   Have a 1/2 cup serving of low-fat frozen yogurt instead of a 1-and-1/2-ounce chocolate bar. You'll save about 110 calories.   Use low-fat or fat-free condiments, such as fat-free salad dressings.   Eat smaller portions. Cut back gradually.   Use food labels to compare fat and calorie content in packaged foods. Items marked low fat or fat free may be lower in fat but not lower in calories than their regular versions.   Limit foods with lots of added sugar, such as pies, flavored yogurts, candy bars, ice cream, sherbet, regular soft drinks, and fruit drinks.   Drink water or club soda instead of cola or other soda drinks.     For more information, see the National Heart, Lung, and Blood [place] Web site at: http://www.nhlbi.nih.gov/health/public/heart/hbp/dash/.  yogurt instead of a 1-and-1/2-ounce chocolate bar. You'll save about 110 calories.   Use low-fat or fat-free condiments, such as fat-free salad dressings.   Eat smaller portions. Cut back gradually.   Use food labels to compare fat and calorie content in packaged foods. Items marked low fat or fat free may be lower in fat but not lower in calories than their regular versions.   Limit foods with lots of added sugar, such as pies, flavored yogurts, candy bars, ice cream, sherbet, regular soft drinks, and fruit drinks.   Drink water or club soda instead of cola or other soda drinks.     For more information, see the National Heart, Lung, and Blood [place] Web site at: http://www.nhlbi.nih.gov/health/public/heart/hbp/dash/.

## 2017-08-21 NOTE — LETTER
August 24, 2017    Melissa Coronado  [address]  [place] [address]          Dear Melissa,     Your potassium level is now within normal limits.    Please continue the treatment plan that we discussed at your last clinic visit and let me know if you have any questions via [facility] or by calling [phone].      Marina Crandall MD/[facility]    Results for orders placed or performed in visit on 08/21/17   Basic metabolic panel   Result Value Ref Range    Sodium 140 133 - 144 mmol/L    Potassium 3.8 3.4 - 5.3 mmol/L    Chloride 106 94 - 109 mmol/L    Carbon Dioxide 30 20 - 32 mmol/L    Anion Gap 4 3 - 14 mmol/L    Glucose 103 (H) 70 - 99 mg/dL    Urea Nitrogen 17 7 - 30 mg/dL    Creatinine 0.58 0.52 - 1.04 mg/dL    GFR Estimate >90 >60 mL/min/1.7m2    GFR Estimate If Black >90 >60 mL/min/1.7m2    Calcium 9.2 8.5 - 10.1 mg/dL

## 2017-08-21 NOTE — NURSING NOTE
Chief Complaint   Patient presents with     Establish Care     Hypertension       Initial /68 (BP Location: Right arm, Patient Position: Chair, Cuff Size: Adult Large)  Pulse 62  Temp 97.7  F (36.5  C) (Oral)  Wt 212 lb 9.6 oz (96.4 kg)  SpO2 96%  BMI 41.52 kg/m2 Estimated body mass index is 41.52 kg/(m^2) as calculated from the following:    Height as of 4/21/17: 5' (1.524 m).    Weight as of this encounter: 212 lb 9.6 oz (96.4 kg).  Medication Reconciliation: complete    Evelyn Ayala CMA

## 2017-08-21 NOTE — MR AVS SNAPSHOT
After Visit Summary   8/21/2017    Melissa Coronado    MRN: [id number]           Patient Information     Date Of Birth          1955        Visit Information        Provider Department      8/21/2017 9:50 AM Marina Crandall MD [facility]        Today's Diagnoses     Benign essential hypertension    -  1    Episodic tension-type headache, not intractable          Care Instructions    For your blood pressure:  Please start taking metoprolol and continue diltiazem.  Continue your healthy diet habits.  See tips below for the DASH diet.     We will let you know your test results as discussed: by phone for urgent results, otherwise by postal mail.  We do advise yearly eye doctor appointments for patients with a history of high blood pressure. Keep your upcoming appointment today, with the eye doctor. Ask them to send a copy of their note to us.    For your headaches:  The metoprolol which we will add today, to your diltiazem should help with your headaches.  Please see below for more tips regarding headaches.    Please return to clinic in 2 months to recheck your blood pressure and your headaches.        Tension Headaches     Massaging your neck muscles can help relieve tension headache pain.     Tension headaches cause a dull, steady pain on both sides of the head and in the neck and the back of the head. The eyes may also feel tired. Tension headaches can be triggered by muscle tension and clenching, lack of sleep, poor posture, eyestrain, stress,depression, anxiety, arthritis of the neck, and other factors.  To help prevent tension headaches  Take the following steps:     Make sure your work area is properly set up to help you avoid neck strain and eyestrain.    Make sure that your eyeglass prescription is current and is appropriate for the work you do.    Learn techniques for relaxing and reducing emotional stress. These include deep breathing, progressive relaxation, yoga,  meditation, and biofeedback.    Maintain a regular exercise regimen under the guidance of a doctor. This can help keep your neck and back flexible, strong, and relaxed.  To relieve the pain  Take the following steps:    Use moist heat to relax the muscles. Soak in a hot bath or wrap a warm, moist towel around your neck.    Brush your scalp lightly with a soft hairbrush.    Give yourself a massage. Knead the muscles running from your shoulders up the back of your skull.    Use an ice pack. Apply this directly to the place where you feel pain.    Rest. Sleeping often helps relieve headache pain.    Drink plenty of fluids. Dehydration is another trigger for headaches.    Use pain medicine sparingly for moderate to severe pain.   Date Last Reviewed: 10/19/2015    6343-3674 [employer]. [address] [place] [address], [place] [address]. All rights reserved. This information is not intended as a substitute for professional medical care. Always follow your healthcare professional's instructions.        Self-Care for Headaches  Most headaches aren't serious and can be relieved with self-care. But some headaches may be a sign of another health problem like eye trouble or high blood pressure. To find the best treatment, learn what kind of headaches you get. For tension headaches, self-care will usually help. To treat migraines, ask your healthcare provider for advice. It is also possible to get both tension and migraine headaches. Self-care involves relieving the pain and avoiding headache  triggers  if you can.    Ways to reduce pain and tension  Try these steps:    Apply a cold compress or ice pack to the pain site.    Drink fluids. If nausea makes it hard to drink, try sucking on ice.    Rest. Protect yourself from bright light and loud noises.    Calm your emotions by imagining a peaceful scene.    Massage tight neck, shoulder, and head muscles.    To relax muscles, soak in a hot bath or use a hot shower.  Use  "medicines  Aspirin or aspirin substitutes, such as ibuprofen and acetaminophen, can relieve headache. Remember: Never give aspirin to anyone 18 years old or younger because of the risk of developing Reye syndrome. Use pain medicines only when necessary.  Track your headaches  Keeping a headache diary can help you and your healthcare provider identify what's causing your headaches:    Note when each headache happens.    Identify your activities and the foods you've eaten 6 to 8 hours before the headache began.    Look for any trends or \"triggers.\"  Signs of tension headache  Any of the following can be signs:    Dull pain or feeling of pressure in a tight band around your head    Pain in your neck or shoulders    Headache without a definite beginning or end    Headache after an activity such as driving or working on a computer  Signs of migraine  Any of the following can be signs:    Throbbing pain on one or both sides of your head    Nausea or vomiting    Extreme sensitivity to light, sound, and smells    Bright spots, flashes, or other visual changes    Pain or nausea so severe that you can't continue your daily activities  Call your healthcare provider   If you have any of the following symptoms, contact your healthcare provider:    A headache that lingers after a recent injury or bump to the head.    A fever with a stiff neck or pain when you bend your head toward your chest.    A headache along with slurred speech, changes in your vision, or numbness or weakness in your arms or legs.    A headache for longer than 3 days.    Frequent headaches, especially in the morning.    Headaches with seizures     Seek immediate medical attention if you have a headache that you would call \"the worst headache you have ever had.\"   Date Last Reviewed: 10/4/2015    4367-7713 [employer]. [address] [place] [address], [place] [address]. All rights reserved. This information is not intended as a substitute for professional " "medical care. Always follow your healthcare professional's instructions.        * Tension Headache    Muscle Tension Headache (also called \"stress headache\") is a very common cause of head pain. Under stress, some people tense the muscles of their shoulder, neck and scalp without knowing it. If this lasts long enough, a headache can occur. These headaches can be very painful and last for hours or even days.  Home Care:    If you were given pain medicine for this headache, do not drive yourself home. Arrange for a ride, instead. When you get home, try to sleep. You should feel much better when you wake up.    Heat to the back of your neck may relieve neck spasm.    Drink only clear liquids or eat a very light diet to avoid nausea/vomiting until symptoms improve.  Preventing Future Headaches    Identify the sources of stress in your life. These may not be obvious! Learn new ways to handle your stress, such as regular exercise, biofeedback, self-hypnosis and meditation. For more information about this, consult your doctor or go to a local bookstore and review the many books and tapes on this subject.    At the first sign of a tension headache, take time out if possible. Remove yourself from the stressful situation, find a quiet comfortable place to sit or lie down and let yourself relax. Heat and deep massage of the tight areas in the neck and shoulders may help reduce muscle spasm. Medicine, such as ibuprofen (Advil or Motrin) or a prescribed muscle relaxant may be helpful at this point.  Follow Up with your doctor if the headache is not better within the next 24 hours. If you have frequent headaches you should discuss a treatment plan with your primary care doctor. Ask if you can have medicine to take at home the next time you get a bad headache. This may avoid the need for a visit to the emergency department in the future. Poorly controlled chronic headaches may require a referral to a neurologist (headache " specialist).  Call your Doctor Or Get Prompt Medical Attention if any of the following occur:    Worsening of your head pain or no improvement within 24 hours    Repeated vomiting (unable to keep liquids down)    Fever of 100.4 F (38.0 C) or higher, or as directed by your healthcare provider    Stiff neck    Extreme drowsiness, confusion or fainting    Dizziness, vertigo (dizziness with spinning sensation)    Weakness of an arm or leg or one side of the face    Difficulty with speech or vision    0460-1049 [facility], [address] [place] [address]. All rights reserved. This information is not intended as a substitute for professional medical care. Always follow your healthcare professional's instructions.                                       Dietary Approaches to Stop Hypertension (The DASH Diet)   What is hypertension?   Hypertension is blood pressure that keeps being higher than normal. Blood pressure is the force of blood against artery walls as the heart pumps blood through the body. Blood pressure can be unhealthy if it is above 120/80. The higher your blood pressure, the greater the health risk.   High blood pressure can be controlled if you take these steps:   Maintain a healthy weight.   Are physically active.   Follow a healthy eating plan, which includes foods that do not have a lot of salt and sodium.   Do not drink a lot of alcohol.   Diet affects high blood pressure. Following the DASH diet and reducing the amount of sodium in your diet will help lower your blood pressure. It will also help prevent high blood pressure.   What is the DASH diet?   Dietary Approaches to Stop Hypertension (DASH) is a diet that is low in saturated fat, cholesterol, and total fat. It emphasizes fruits, vegetables, and low-fat dairy foods. The DASH diet also includes whole-grain products, fish, poultry, and nuts. It encourages fewer servings of red meat, sweets, and sugar-containing beverages. It is rich in  magnesium, potassium, and calcium, as well as protein and fiber.   How do I get started on the DASH diet?   The DASH diet requires no special foods and has no hard-to-follow recipes. Start by seeing how DASH compares with your current eating habits.   The DASH eating plan illustrated below is based on a diet of 2,000 calories a day. Your healthcare provider or a dietitian can help you determine how many calories a day you need. Most adults need somewhere between 1600 and 2800 calories a day. Serving sizes for different foods vary from 1/2 cup to 1 and 1/4 cups. Check product nutrition labels for serving sizes and the number of calories per serving.                      Number of        Examples of  Food Group      servings         serving size  ----------------------------------------------------------------    Grains and      7 to 8 per day   1 slice of bread  grain products                   1 cup ready-to-eat cold cereal                                   1/2 cup cooked rice, pasta,                                   or cereal    Vegetables      4 to 5 per day   1 cup raw leafy vegetable                                   1/2 cup cooked vegetable                                   6 ounces (oz) vegetable juice      Fruits          4 to 5 per day   1 medium fruit                                   1/4 cup dried fruit                                   1/2 cup fresh, frozen, or                                   canned fruit                                   6 oz fruit juice      Low-fat or      2 to 3 per day   8 oz milk  fat-free                         1 cup yogurt  dairy foods                      1 and 1/2 oz cheese    Lean meats,  poultry,        2 or fewer per   3 oz cooked lean meat,  or fish         day              skinless poultry, or fish    Nuts, seeds,    4 to 5 per week  1/3 cup or 1 and 1/2 oz nuts  and dry beans                    1 tablespoon or 1/2 oz seeds                                   1/2 cup  cooked dry beans    Fats and oils   2 to 3 per day   1 teaspoon soft margarine                                   1 tablespoon low-fat mayonnaise                                   2 tablespoons light salad                                   dressing                                   1 teaspoon vegetable oil    Sweets          5 per week       1 tablespoon sugar                                   1 tablespoon jelly or jam                                   1/2 oz jelly beans                                   8 oz lemonade  ----------------------------------------------------------------  Make changes gradually. Here are some suggestions that might help:   If you now eat 1 or 2 servings of vegetables a day, add a serving at lunch and another at dinner.   If you have not been eating fruit regularly, or have only juice at breakfast, add a serving to your meals or have it as a snack.   Drink milk or water with lunch or dinner instead of soda, sugar-sweetened tea, or alcohol. Choose low-fat (1%) or fat-free (nonfat) dairy products so that you are eating fewer calories and less saturated fat, total fat, and cholesterol.   Read food labels on margarines and salad dressings to choose products lowest in fat.   If you now eat large portions of meat, slowly cut back--by a half or a third at each meal. Limit meat to 6 ounces a day (two 3-ounce servings). Three to 4 ounces is about the size of a deck of cards.   Have 2 or more meatless meals each week. Increase servings of vegetables, rice, pasta, and beans in all meals. Try casseroles, pasta, and stir-de paz dishes, which have less meat and more vegetables, grains, and beans.   Use fruits canned in their own juice. Fresh fruits require little or no preparation. Dried fruits are a good choice to carry with you or to have ready in the car.   Try these snacks ideas: unsalted pretzels or nuts mixed with raisins, afshan crackers, low-fat and fat-free yogurt or frozen yogurt, popcorn with no  salt or butter added, and raw vegetables.   Choose whole-grain foods to get more nutrients, including minerals and fiber. For example, choose whole-wheat bread, whole-grain cereals, or brown rice.   Use fresh, frozen, or no-salt-added canned vegetables.   Remember to also reduce the salt and sodium in your diet. Try to have no more than 2000 milligrams (mg) of sodium per day, with a goal of further reducing the sodium to 1500 mg per day. Three important ways to reduce sodium are:   Eat food products with reduced-sodium or no salt added.   Use less salt when you prepare foods and do not add salt to your food at the table.   Read food labels. Aim for foods that contain less than 5% of the daily value of sodium.   The DASH eating plan is not designed for weight loss. But it contains many lower-calorie foods, such as fruits and vegetables. You can make it lower in calories by replacing high-calorie foods with more fruits and vegetables. Some ideas to increase fruits and vegetables and decrease calories include:   Eat a medium apple instead of 4 shortbread cookies. You'll save 80 calories.   Eat 1/4 cup of dried apricots instead of a 2-ounce bag of pork rinds. You'll save 230 calories.   Have a hamburger that's 3 ounces instead of 6 ounces. Add a 1/2 cup serving of carrots and a 1/2 cup serving of spinach. You'll save more than 200 calories.   Instead of 5 ounces of chicken, have a stir de paz with 2 ounces of chicken and 1 and 1/2 cups of raw vegetables. Use just a small amount of vegetable oil. You'll save 50 calories.   Have a 1/2 cup serving of low-fat frozen                                 Dietary Approaches to Stop Hypertension (The DASH Diet)   What is hypertension?   Hypertension is blood pressure that keeps being higher than normal. Blood pressure is the force of blood against artery walls as the heart pumps blood through the body. Blood pressure can be unhealthy if it is above 120/80. The higher your blood  pressure, the greater the health risk.   High blood pressure can be controlled if you take these steps:   Maintain a healthy weight.   Are physically active.   Follow a healthy eating plan, which includes foods that do not have a lot of salt and sodium.   Do not drink a lot of alcohol.   Diet affects high blood pressure. Following the DASH diet and reducing the amount of sodium in your diet will help lower your blood pressure. It will also help prevent high blood pressure.   What is the DASH diet?   Dietary Approaches to Stop Hypertension (DASH) is a diet that is low in saturated fat, cholesterol, and total fat. It emphasizes fruits, vegetables, and low-fat dairy foods. The DASH diet also includes whole-grain products, fish, poultry, and nuts. It encourages fewer servings of red meat, sweets, and sugar-containing beverages. It is rich in magnesium, potassium, and calcium, as well as protein and fiber.   How do I get started on the DASH diet?   The DASH diet requires no special foods and has no hard-to-follow recipes. Start by seeing how DASH compares with your current eating habits.   The DASH eating plan illustrated below is based on a diet of 2,000 calories a day. Your healthcare provider or a dietitian can help you determine how many calories a day you need. Most adults need somewhere between 1600 and 2800 calories a day. Serving sizes for different foods vary from 1/2 cup to 1 and 1/4 cups. Check product nutrition labels for serving sizes and the number of calories per serving.                      Number of        Examples of  Food Group      servings         serving size  ----------------------------------------------------------------    Grains and      7 to 8 per day   1 slice of bread  grain products                   1 cup ready-to-eat cold cereal                                   1/2 cup cooked rice, pasta,                                   or cereal    Vegetables      4 to 5 per day   1 cup raw leafy  vegetable                                   1/2 cup cooked vegetable                                   6 ounces (oz) vegetable juice      Fruits          4 to 5 per day   1 medium fruit                                   1/4 cup dried fruit                                   1/2 cup fresh, frozen, or                                   canned fruit                                   6 oz fruit juice      Low-fat or      2 to 3 per day   8 oz milk  fat-free                         1 cup yogurt  dairy foods                      1 and 1/2 oz cheese    Lean meats,  poultry,        2 or fewer per   3 oz cooked lean meat,  or fish         day              skinless poultry, or fish    Nuts, seeds,    4 to 5 per week  1/3 cup or 1 and 1/2 oz nuts  and dry beans                    1 tablespoon or 1/2 oz seeds                                   1/2 cup cooked dry beans    Fats and oils   2 to 3 per day   1 teaspoon soft margarine                                   1 tablespoon low-fat mayonnaise                                   2 tablespoons light salad                                   dressing                                   1 teaspoon vegetable oil    Sweets          5 per week       1 tablespoon sugar                                   1 tablespoon jelly or jam                                   1/2 oz jelly beans                                   8 oz lemonade  ----------------------------------------------------------------  Make changes gradually. Here are some suggestions that might help:   If you now eat 1 or 2 servings of vegetables a day, add a serving at lunch and another at dinner.   If you have not been eating fruit regularly, or have only juice at breakfast, add a serving to your meals or have it as a snack.   Drink milk or water with lunch or dinner instead of soda, sugar-sweetened tea, or alcohol. Choose low-fat (1%) or fat-free (nonfat) dairy products so that you are eating fewer calories and less saturated fat,  total fat, and cholesterol.   Read food labels on margarines and salad dressings to choose products lowest in fat.   If you now eat large portions of meat, slowly cut back--by a half or a third at each meal. Limit meat to 6 ounces a day (two 3-ounce servings). Three to 4 ounces is about the size of a deck of cards.   Have 2 or more meatless meals each week. Increase servings of vegetables, rice, pasta, and beans in all meals. Try casseroles, pasta, and stir-de paz dishes, which have less meat and more vegetables, grains, and beans.   Use fruits canned in their own juice. Fresh fruits require little or no preparation. Dried fruits are a good choice to carry with you or to have ready in the car.   Try these snacks ideas: unsalted pretzels or nuts mixed with raisins, afshan crackers, low-fat and fat-free yogurt or frozen yogurt, popcorn with no salt or butter added, and raw vegetables.   Choose whole-grain foods to get more nutrients, including minerals and fiber. For example, choose whole-wheat bread, whole-grain cereals, or brown rice.   Use fresh, frozen, or no-salt-added canned vegetables.   Remember to also reduce the salt and sodium in your diet. Try to have no more than 2000 milligrams (mg) of sodium per day, with a goal of further reducing the sodium to 1500 mg per day. Three important ways to reduce sodium are:   Eat food products with reduced-sodium or no salt added.   Use less salt when you prepare foods and do not add salt to your food at the table.   Read food labels. Aim for foods that contain less than 5% of the daily value of sodium.   The DASH eating plan is not designed for weight loss. But it contains many lower-calorie foods, such as fruits and vegetables. You can make it lower in calories by replacing high-calorie foods with more fruits and vegetables. Some ideas to increase fruits and vegetables and decrease calories include:   Eat a medium apple instead of 4 shortbread cookies. You'll save 80  calories.   Eat 1/4 cup of dried apricots instead of a 2-ounce bag of pork rinds. You'll save 230 calories.   Have a hamburger that's 3 ounces instead of 6 ounces. Add a 1/2 cup serving of carrots and a 1/2 cup serving of spinach. You'll save more than 200 calories.   Instead of 5 ounces of chicken, have a stir de paz with 2 ounces of chicken and 1 and 1/2 cups of raw vegetables. Use just a small amount of vegetable oil. You'll save 50 calories.   Have a 1/2 cup serving of low-fat frozen yogurt instead of a 1-and-1/2-ounce chocolate bar. You'll save about 110 calories.   Use low-fat or fat-free condiments, such as fat-free salad dressings.   Eat smaller portions. Cut back gradually.   Use food labels to compare fat and calorie content in packaged foods. Items marked low fat or fat free may be lower in fat but not lower in calories than their regular versions.   Limit foods with lots of added sugar, such as pies, flavored yogurts, candy bars, ice cream, sherbet, regular soft drinks, and fruit drinks.   Drink water or club soda instead of cola or other soda drinks.     For more information, see the National Heart, Lung, and [employer] [place] Web site at: http://www.nhlbi.nih.gov/health/public/heart/hbp/dash/.  yogurt instead of a 1-and-1/2-ounce chocolate bar. You'll save about 110 calories.   Use low-fat or fat-free condiments, such as fat-free salad dressings.   Eat smaller portions. Cut back gradually.   Use food labels to compare fat and calorie content in packaged foods. Items marked low fat or fat free may be lower in fat but not lower in calories than their regular versions.   Limit foods with lots of added sugar, such as pies, flavored yogurts, candy bars, ice cream, sherbet, regular soft drinks, and fruit drinks.   Drink water or club soda instead of cola or other soda drinks.     For more information, see the National Heart, Lung, and Blood [place] Web site at:  "http://www.nhlbi.nih.gov/health/public/heart/hbp/dash/.                Follow-ups after your visit        Who to contact     If you have questions or need follow up information about today's clinic visit or your schedule please contact [facility] directly at [phone].  Normal or non-critical lab and imaging results will be communicated to you by MyChart, letter or phone within 4 business days after the clinic has received the results. If you do not hear from us within 7 days, please contact the clinic through MyChart or phone. If you have a critical or abnormal lab result, we will notify you by phone as soon as possible.  Submit refill requests through [employer] or call your pharmacy and they will forward the refill request to us. Please allow 3 business days for your refill to be completed.          Additional Information About Your Visit        MyChart Information     [employer] lets you send messages to your doctor, view your test results, renew your prescriptions, schedule appointments and more. To sign up, go to [email or web address] . Click on \"Log in\" on the left side of the screen, which will take you to the Welcome page. Then click on \"Sign up Now\" on the right side of the page.     You will be asked to enter the access code listed below, as well as some personal information. Please follow the directions to create your username and password.     Your access code is: [email or web address]  Expires: 2017 10:53 AM     Your access code will  in 90 days. If you need help or a new code, please call your [facility] or [phone].        Care EveryWhere ID     This is your Care EveryWhere ID. This could be used by other organizations to access your [place] medical records  [id number]        Your Vitals Were     Pulse Temperature Pulse Oximetry BMI (Body Mass Index)          62 97.7  F (36.5  C) (Oral) 96% 41.52 kg/m2         Blood Pressure from Last 3 Encounters:   17 148/68 "   08/08/17 170/82   04/21/17 168/71    Weight from Last 3 Encounters:   08/21/17 212 lb 9.6 oz (96.4 kg)   08/08/17 216 lb (98 kg)   04/21/17 210 lb (95.3 kg)              We Performed the Following     Basic metabolic panel          Today's Medication Changes          These changes are accurate as of: 8/21/17 10:49 AM.  If you have any questions, ask your nurse or doctor.               Start taking these medicines.        Dose/Directions    metoprolol 25 MG 24 hr tablet   Commonly known as:  TOPROL-XL   Used for:  Benign essential hypertension, Episodic tension-type headache, not intractable   Started by:  Marina Crandall MD        Dose:  12.5 mg   Take 0.5 tablets (12.5 mg) by mouth daily   Quantity:  45 tablet   Refills:  1         Stop taking these medicines if you haven't already. Please contact your care team if you have questions.     HYDROCHLOROTHIAZIDE PO   Stopped by:  Marina Crandall MD                Where to get your medicines      These medications were sent to [facility]/pharmacy #[id number] - [place], [place] - [address] [address] [place] [address]     Phone:  [phone]     metoprolol 25 MG 24 hr tablet                Primary Care Provider Office Phone #    [facility] [phone]       No address on file        Equal Access to Services     JOHN MAJOR AH: Hadcal alaniz Sohu, waaxda luqadaha, qaybta kaalmada belia, leonila barnes . So [facility] [phone].    ATENCIÓN: Si habla español, tiene a sprague disposición servicios gratuitos de asistencia lingüística. Llame al [phone].    We comply with applicable federal civil rights laws and [place] laws. We do not discriminate on the basis of race, color, national origin, age, disability sex, sexual orientation or gender identity.            Thank you!     [facility] you [facility]  [facility]. Our goal is always  to provide you with excellent care. Hearing back from our patients is one way we can continue to improve our services. Please take a few minutes to complete the written survey that you may receive in the mail after your visit with us. Thank you!             Your Updated Medication List - Protect others around you: Learn how to safely use, store and throw away your medicines at www.disposemymeds.org.          This list is accurate as of: 8/21/17 10:49 AM.  Always use your most recent med list.                   Brand Name Dispense Instructions for use Diagnosis    ALEVE 220 MG tablet   Generic drug:  naproxen sodium      Take by mouth 2 times daily (with meals)        diltiazem 120 MG 24 hr capsule    CARDIZEM CD    30 capsule    Take 1 capsule (120 mg) by mouth daily    Hypertension goal BP (blood pressure) < 140/90       metoprolol 25 MG 24 hr tablet    TOPROL-XL    45 tablet    Take 0.5 tablets (12.5 mg) by mouth daily    Benign essential hypertension, Episodic tension-type headache, not intractable       TYLENOL EXTRA STRENGTH PO      Take 500 mg by mouth Reported on 4/13/2017

## 2017-08-22 PROBLEM — E66.01 MORBID OBESITY (H): Status: ACTIVE | Noted: 2017-08-22

## 2017-08-22 ASSESSMENT — ANXIETY QUESTIONNAIRES: GAD7 TOTAL SCORE: 3

## 2017-08-24 NOTE — PROGRESS NOTES
Please send letter informing the patient and attach results:    Dear Melissa,     Your potassium level is now within normal limits.    Please continue the treatment plan that we discussed at your last clinic visit and let me know if you have any questions via [employer] or by calling [phone].      Marina Crandall MD

## 2017-09-04 DIAGNOSIS — I10 HYPERTENSION GOAL BP (BLOOD PRESSURE) < 140/90: ICD-10-CM

## 2017-09-06 RX ORDER — DILTIAZEM HYDROCHLORIDE 120 MG/1
CAPSULE, COATED, EXTENDED RELEASE ORAL
Qty: 30 CAPSULE | Refills: 0 | Status: SHIPPED | OUTPATIENT
Start: 2017-09-06 | End: 2017-10-14

## 2017-10-14 DIAGNOSIS — I10 HYPERTENSION GOAL BP (BLOOD PRESSURE) < 140/90: ICD-10-CM

## 2017-10-17 RX ORDER — DILTIAZEM HYDROCHLORIDE 120 MG/1
120 CAPSULE, COATED, EXTENDED RELEASE ORAL DAILY
Qty: 30 CAPSULE | Refills: 0 | Status: SHIPPED | OUTPATIENT
Start: 2017-10-17 | End: 2017-12-16

## 2017-12-16 DIAGNOSIS — I10 HYPERTENSION GOAL BP (BLOOD PRESSURE) < 140/90: ICD-10-CM

## 2017-12-19 RX ORDER — DILTIAZEM HYDROCHLORIDE 120 MG/1
CAPSULE, COATED, EXTENDED RELEASE ORAL
Qty: 30 CAPSULE | Refills: 0 | Status: SHIPPED | OUTPATIENT
Start: 2017-12-19 | End: 2017-12-22

## 2017-12-19 NOTE — TELEPHONE ENCOUNTER
Overdue for a follow-up visit and no showed to the last appointment.  I will send a 1 month supply-please call and advise the patient to schedule an appointment with me, within that time frame.  Thank you,  Marina Crandall MD

## 2017-12-22 ENCOUNTER — OFFICE VISIT (OUTPATIENT)
Dept: INTERNAL MEDICINE | Facility: CLINIC | Age: 62
End: 2017-12-22
Payer: COMMERCIAL

## 2017-12-22 DIAGNOSIS — I10 HYPERTENSION GOAL BP (BLOOD PRESSURE) < 140/90: ICD-10-CM

## 2017-12-22 DIAGNOSIS — J40 BRONCHITIS: Primary | ICD-10-CM

## 2017-12-22 PROCEDURE — 99214 OFFICE O/P EST MOD 30 MIN: CPT | Performed by: INTERNAL MEDICINE

## 2017-12-22 RX ORDER — AZITHROMYCIN 250 MG/1
TABLET, FILM COATED ORAL
Qty: 6 TABLET | Refills: 0 | Status: SHIPPED | OUTPATIENT
Start: 2017-12-22 | End: 2018-01-18

## 2017-12-22 RX ORDER — DILTIAZEM HYDROCHLORIDE 180 MG/1
180 CAPSULE, COATED, EXTENDED RELEASE ORAL DAILY
Qty: 90 CAPSULE | Refills: 0 | Status: SHIPPED | OUTPATIENT
Start: 2017-12-22 | End: 2018-01-18

## 2017-12-22 NOTE — MR AVS SNAPSHOT
After Visit Summary   12/22/2017    Melissa Coronado    MRN: [id number]           Patient Information     Date Of Birth          1955        Visit Information        Provider Department      12/22/2017 10:10 AM Marina Crandall MD [facility]        Today's Diagnoses     Bronchitis    -  1    Hypertension goal BP (blood pressure) < 140/90          Care Instructions    For your current cold and cough symptoms:  Treat this as you would treat a regular cold. If your symptoms are not better after a total of 10 days, start to take the antibiotic (Zpack).  If your symptoms become worse in the next few days, start to take the antibiotic. Otherwise, do not take the antibiotic.  Please also measure your temperature every 4 hours and take acetaminophen (Tylenol) or Ibuprofen for a fever of 100.4 degrees F or above.     For your blood pressure:  Please increase the Diltiazem from 120mg daily to 180mg daily (I sent in a new prescription).  Continue your metoprolol.   return to clinic in a month.     For your headaches:  You can retry using the Aleve (Naproxen).   See the tips below, regarding headaches:    Tension Headaches     Massaging your neck muscles can help relieve tension headache pain.     Tension headaches cause a dull, steady pain on both sides of the head and in the neck and the back of the head. The eyes may also feel tired. Tension headaches can be triggered by muscle tension and clenching, lack of sleep, poor posture, eyestrain, stress,depression, anxiety, arthritis of the neck, and other factors.  To help prevent tension headaches  Take the following steps:    Make sure your work area is properly set up to help you avoid neck strain and eyestrain.    Make sure that your eyeglass prescription is current and is appropriate for the work you do.    Learn techniques for relaxing and reducing emotional stress. These include deep breathing, progressive relaxation, yoga,  meditation, and biofeedback.    Maintain a regular exercise regimen under the guidance of a doctor. This can help keep your neck and back flexible, strong, and relaxed.  To relieve the pain  Take the following steps:    Use moist heat to relax the muscles. Soak in a hot bath or wrap a warm, moist towel around your neck.    Brush your scalp lightly with a soft hairbrush.    Give yourself a massage. Knead the muscles running from your shoulders up the back of your skull.    Use an ice pack. Apply this directly to the place where you feel pain.    Rest. Sleeping often helps relieve headache pain.    Drink plenty of fluids. Dehydration is another trigger for headaches.    Use pain medicine sparingly for moderate to severe pain.   Date Last Reviewed: 10/19/2015    1705-3825 [employer]. [address] [place] [address], [place] [address]. All rights reserved. This information is not intended as a substitute for professional medical care. Always follow your healthcare professional's instructions.        Self-Care for Headaches  Most headaches aren't serious and can be relieved with self-care. But some headaches may be a sign of another health problem like eye trouble or high blood pressure. To find the best treatment, learn what kind of headaches you get. For tension headaches, self-care will usually help. To treat migraines, ask your healthcare provider for advice. It is also possible to get both tension and migraine headaches. Self-care involves relieving the pain and avoiding headache  triggers  if you can.    Ways to reduce pain and tension  Try these steps:    Apply a cold compress or ice pack to the pain site.    Drink fluids. If nausea makes it hard to drink, try sucking on ice.    Rest. Protect yourself from bright light and loud noises.    Calm your emotions by imagining a peaceful scene.    Massage tight neck, shoulder, and head muscles.    To relax muscles, soak in a hot bath or use a hot shower.  Use  "medicines  Aspirin or aspirin substitutes, such as ibuprofen and acetaminophen, can relieve headache. Remember: Never give aspirin to anyone 18 years old or younger because of the risk of developing Reye syndrome. Use pain medicines only when necessary.  Track your headaches  Keeping a headache diary can help you and your healthcare provider identify what's causing your headaches:    Note when each headache happens.    Identify your activities and the foods you've eaten 6 to 8 hours before the headache began.    Look for any trends or \"triggers.\"  Signs of tension headache  Any of the following can be signs:    Dull pain or feeling of pressure in a tight band around your head    Pain in your neck or shoulders    Headache without a definite beginning or end    Headache after an activity such as driving or working on a computer  Signs of migraine  Any of the following can be signs:    Throbbing pain on one or both sides of your head    Nausea or vomiting    Extreme sensitivity to light, sound, and smells    Bright spots, flashes, or other visual changes    Pain or nausea so severe that you can't continue your daily activities  Call your healthcare provider   If you have any of the following symptoms, contact your healthcare provider:    A headache that lingers after a recent injury or bump to the head.    A fever with a stiff neck or pain when you bend your head toward your chest.    A headache along with slurred speech, changes in your vision, or numbness or weakness in your arms or legs.    A headache for longer than 3 days.    Frequent headaches, especially in the morning.    Headaches with seizures     Seek immediate medical attention if you have a headache that you would call \"the worst headache you have ever had.\"   Date Last Reviewed: 10/4/2015    4707-4900 [employer]. [address] [place] [address], [place] [address]. All rights reserved. This information is not intended as a substitute for professional " medical care. Always follow your healthcare professional's instructions.        Preventing Tension Headaches  Lifestyle changes are the key to preventing tension-type headaches. Learn what changes in your environment and daily activities can prevent the strain and tension that lead to headaches. If emotional stress is a factor, stress reduction may bring relief. Other lifestyle changes can help keep you healthier and better able to cope with pain.  What may cause your headaches  Causes of tension-type headaches include:    Posture and movement. Your posture while you sit, work, drive, and even sleep can put stress on your shoulders and neck. This can tighten muscles in the back of your head, causing headaches.    Lifting and carrying. These can cause strain on your back and neck, especially if you carry too much weight, carry weight unevenly, or use poor lifting technique.    Certain sports. Activities that involve jumping, running, and sudden starts, stops, or changes of direction can jar your neck and head. This may lead to tight muscles and pain. Weightlifting and other activities that require upper body strength can lead to tight neck and shoulder muscles.    Jaw tension. Clenching your jaw or grinding your teeth can result in tension and pain. This may happen while you sleep without your knowing it.    Eye problems. Eyestrain can cause tension in the muscles around the eyes. Or a problem with your eyeglass prescription can make you hold your head at an awkward angle. This can cause neck strain and headaches.    Emotional stress. Many factors lead to emotional stress: overwork, family problems, financial difficulties, or sudden life changes. This may cause muscle tension.  What you can do  Once you know what s causing your headaches, you can work to prevent them. You may need to seek professional help to make some of the needed changes.    Posture and movement changes. Change the setup of your workspace and car.  Learn and maintain good posture. Avoid positions that strain your neck or shoulders. Make sure your bedding and pillows provide good support. Avoid sleeping on couches, chairs, or floors when a bed is available.     Lifting and carrying. Learn good lifting technique. Make sure to use the proper tools and equipment for lifting.    Change your sport. Switch to a low-impact sport. To help relieve stress on your neck and head, cut back on activities that depend on upper body strength or that put a lot of twisting motion on the back, such as golf.     Dental work. See your dentist if you think your headaches are caused by jaw tension or teeth grinding.    New eyewear. Buy an extra pair of glasses adjusted for reading or working at a computer. This helps to reduce eyestrain and keep your neck at a comfortable angle.    Stress management. Learn techniques for relaxing and reducing emotional stress, like deep breathing, visualization, progressive relaxation, and biofeedback. Regular sleep habits can also help to control stress.    Regular sleep and meals. It is important to have a regular sleep cycle and to avoid skipping meals.  Exercise can help  Exercise can improve overall health and help you to relax.    Neck exercises help keep your neck muscles relaxed during the day. Try the neck exercises shown on this sheet. Start in a neutral (relaxed, centered) position. Do 3 repetitions every 2 to 4 hours throughout the day.    Low-impact aerobic exercise helps keep your muscles strong and flexible. This helps prevent tension and the pain it causes.    Stretching, richard chi, and yoga help keep your muscles flexible. They may also help relieve emotional stress.    Lower your left ear toward your left shoulder. Return to neutral. Repeat on the right side.   Lower your chin to your chest and slowly return to neutral.     Look to the left. Return to neutral. Then look to the right.     Move your head forward and back while keeping  "the top of your head level.   Date Last Reviewed: 11/8/2015 2000-2017 [employer]. [address] [place] [address], [place] [address]. All rights reserved. This information is not intended as a substitute for professional medical care. Always follow your healthcare professional's instructions.                Follow-ups after your visit        Your next 10 appointments already scheduled     Dec 29, 2017  8:50 AM CST   Office Visit with Marina [facility]all MD   [facility]    [address] [address] [phone]           Bring a current list of meds and any records pertaining to this visit. For Physicals, please bring immunization records and any forms needing to be filled out. Please arrive 10 minutes early to complete paperwork.              Who to contact     If you have questions or need follow up information about today's clinic visit or your schedule please contact [facility] directly at [phone].  Normal or non-critical lab and imaging results will be communicated to you by [employer], letter or phone within 4 business days after the clinic has received the results. If you do not hear from us within 7 days, please contact the clinic through [employer] or phone. If you have a critical or abnormal lab result, we will notify you by phone as soon as possible.  Submit refill requests through [employer] or call your pharmacy and they will forward the refill request to us. Please allow 3 business days for your refill to be completed.          Additional Information About Your Visit        [employer] Information     [employer] lets you send messages to your doctor, view your test results, renew your prescriptions, schedule appointments and more. To sign up, go to [email or web address] . Click on \"Log in\" on the left side of the screen, which will take you to the Welcome page. Then click on \"Sign up Now\" on the right side of the page. "     You will be asked to enter the access code listed below, as well as some personal information. Please follow the directions to create your username and password.     Your access code is: [email or web address]  Expires: 3/22/2018 10:44 AM     Your access code will  in 90 days. If you need help or a new code, please call your [place] clinic or [phone].        Care EveryWhere ID     This is your Care EveryWhere ID. This could be used by other organizations to access your [place] medical records  [id number]        Your Vitals Were     Pulse Temperature Respirations Height Pulse Oximetry BMI (Body Mass Index)    72 100.6  F (38.1  C) (Oral) 22 5' (1.524 m) 95% 40.43 kg/m2       Blood Pressure from Last 3 Encounters:   17 155/80   17 148/68   17 170/82    Weight from Last 3 Encounters:   17 207 lb (93.9 kg)   17 212 lb 9.6 oz (96.4 kg)   17 216 lb (98 kg)              Today, you had the following     No orders found for display         Today's Medication Changes          These changes are accurate as of: 17 10:44 AM.  If you have any questions, ask your nurse or doctor.               Start taking these medicines.        Dose/Directions    azithromycin 250 MG tablet   Commonly known as:  ZITHROMAX   Used for:  Bronchitis   Started by:  Marina Crandall MD        Two tablets first day, then one tablet daily for four days.   Quantity:  6 tablet   Refills:  0         These medicines have changed or have updated prescriptions.        Dose/Directions    diltiazem 180 MG 24 hr capsule   This may have changed:  See the new instructions.   Used for:  Hypertension goal BP (blood pressure) < 140/90   Changed by:  Marina Crandall MD        Dose:  180 mg   Take 1 capsule (180 mg) by mouth daily   Quantity:  90 capsule   Refills:  0            Where to get your medicines      These medications were sent to [facility]/pharmacy #[id number] - [place], [place] - [address] [place]  [address] AT [facility]  [address] [place] [address] [place] [address]     Phone:  [phone]     azithromycin 250 MG tablet    diltiazem 180 MG 24 hr capsule                Primary Care Provider Office Phone # Fax #    Marina Crandall [phone] [address] [facility] [address]        Equal Access to Services     JOHN MAJOR : Hadii aad ku hadasho Soomaali, waaxda luqadaha, qaybta kaalmada adeegyada, waxay idiin hayaan adeeg kharash laandrade . So [facility] [phone].    ATENCIÓN: Si habla español, tiene a sprague disposición servicios gratuitos de asistencia lingüística. Llame al [phone].    We comply with applicable federal civil rights laws and [place] laws. We do not discriminate on the basis of race, color, national origin, age, disability, sex, sexual orientation, or gender identity.            Thank you!     Thank you for choosing [facility]  for your care. Our goal is always to provide you with excellent care. Hearing back from our patients is one way we can continue to improve our services. Please take a few minutes to complete the written survey that you may receive in the mail after your visit with us. Thank you!             Your Updated Medication List - Protect others around you: Learn how to safely use, store and throw away your medicines at www.disposemymeds.org.          This list is accurate as of: 12/22/17 10:44 AM.  Always use your most recent med list.                   Brand Name Dispense Instructions for use Diagnosis    ALEVE 220 MG tablet   Generic drug:  naproxen sodium      Take by mouth 2 times daily (with meals)        azithromycin 250 MG tablet    ZITHROMAX    6 tablet    Two tablets first day, then one tablet daily for four days.    Bronchitis       diltiazem 180 MG 24 hr capsule     90 capsule    Take 1 capsule (180 mg) by mouth daily    Hypertension goal BP (blood pressure) < 140/90       metoprolol 25 MG 24 hr tablet     TOPROL-XL    45 tablet    Take 0.5 tablets (12.5 mg) by mouth daily    Benign essential hypertension, Episodic tension-type headache, not intractable       TYLENOL EXTRA STRENGTH PO      Take 500 mg by mouth Reported on 4/13/2017

## 2017-12-22 NOTE — PATIENT INSTRUCTIONS
For your current cold and cough symptoms:  Treat this as you would treat a regular cold. If your symptoms are not better after a total of 10 days, start to take the antibiotic (Zpack).  If your symptoms become worse in the next few days, start to take the antibiotic. Otherwise, do not take the antibiotic.  Please also measure your temperature every 4 hours and take acetaminophen (Tylenol) or Ibuprofen for a fever of 100.4 degrees F or above.     For your blood pressure:  Please increase the Diltiazem from 120mg daily to 180mg daily (I sent in a new prescription).  Continue your metoprolol.   return to clinic in a month.     For your headaches:  You can retry using the Aleve (Naproxen).   See the tips below, regarding headaches:    Tension Headaches     Massaging your neck muscles can help relieve tension headache pain.     Tension headaches cause a dull, steady pain on both sides of the head and in the neck and the back of the head. The eyes may also feel tired. Tension headaches can be triggered by muscle tension and clenching, lack of sleep, poor posture, eyestrain, stress,depression, anxiety, arthritis of the neck, and other factors.  To help prevent tension headaches  Take the following steps:    Make sure your work area is properly set up to help you avoid neck strain and eyestrain.    Make sure that your eyeglass prescription is current and is appropriate for the work you do.    Learn techniques for relaxing and reducing emotional stress. These include deep breathing, progressive relaxation, yoga, meditation, and biofeedback.    Maintain a regular exercise regimen under the guidance of a doctor. This can help keep your neck and back flexible, strong, and relaxed.  To relieve the pain  Take the following steps:    Use moist heat to relax the muscles. Soak in a hot bath or wrap a warm, moist towel around your neck.    Brush your scalp lightly with a soft hairbrush.    Give yourself a massage. Knead the muscles  running from your shoulders up the back of your skull.    Use an ice pack. Apply this directly to the place where you feel pain.    Rest. Sleeping often helps relieve headache pain.    Drink plenty of fluids. Dehydration is another trigger for headaches.    Use pain medicine sparingly for moderate to severe pain.   Date Last Reviewed: 10/19/2015    9751-5884 [employer]. [address] [place] [address], [place] [address]. All rights reserved. This information is not intended as a substitute for professional medical care. Always follow your healthcare professional's instructions.        Self-Care for Headaches  Most headaches aren't serious and can be relieved with self-care. But some headaches may be a sign of another health problem like eye trouble or high blood pressure. To find the best treatment, learn what kind of headaches you get. For tension headaches, self-care will usually help. To treat migraines, ask your healthcare provider for advice. It is also possible to get both tension and migraine headaches. Self-care involves relieving the pain and avoiding headache  triggers  if you can.    Ways to reduce pain and tension  Try these steps:    Apply a cold compress or ice pack to the pain site.    Drink fluids. If nausea makes it hard to drink, try sucking on ice.    Rest. Protect yourself from bright light and loud noises.    Calm your emotions by imagining a peaceful scene.    Massage tight neck, shoulder, and head muscles.    To relax muscles, soak in a hot bath or use a hot shower.  Use medicines  Aspirin or aspirin substitutes, such as ibuprofen and acetaminophen, can relieve headache. Remember: Never give aspirin to anyone 18 years old or younger because of the risk of developing Reye syndrome. Use pain medicines only when necessary.  Track your headaches  Keeping a headache diary can help you and your healthcare provider identify what's causing your headaches:    Note when each headache  "happens.    Identify your activities and the foods you've eaten 6 to 8 hours before the headache began.    Look for any trends or \"triggers.\"  Signs of tension headache  Any of the following can be signs:    Dull pain or feeling of pressure in a tight band around your head    Pain in your neck or shoulders    Headache without a definite beginning or end    Headache after an activity such as driving or working on a computer  Signs of migraine  Any of the following can be signs:    Throbbing pain on one or both sides of your head    Nausea or vomiting    Extreme sensitivity to light, sound, and smells    Bright spots, flashes, or other visual changes    Pain or nausea so severe that you can't continue your daily activities  Call your healthcare provider   If you have any of the following symptoms, contact your healthcare provider:    A headache that lingers after a recent injury or bump to the head.    A fever with a stiff neck or pain when you bend your head toward your chest.    A headache along with slurred speech, changes in your vision, or numbness or weakness in your arms or legs.    A headache for longer than 3 days.    Frequent headaches, especially in the morning.    Headaches with seizures     Seek immediate medical attention if you have a headache that you would call \"the worst headache you have ever had.\"   Date Last Reviewed: 10/4/2015    7995-3335 [employer]. [address] [place] [address], [place] [address]. All rights reserved. This information is not intended as a substitute for professional medical care. Always follow your healthcare professional's instructions.        Preventing Tension Headaches  Lifestyle changes are the key to preventing tension-type headaches. Learn what changes in your environment and daily activities can prevent the strain and tension that lead to headaches. If emotional stress is a factor, stress reduction may bring relief. Other lifestyle changes can help keep you " healthier and better able to cope with pain.  What may cause your headaches  Causes of tension-type headaches include:    Posture and movement. Your posture while you sit, work, drive, and even sleep can put stress on your shoulders and neck. This can tighten muscles in the back of your head, causing headaches.    Lifting and carrying. These can cause strain on your back and neck, especially if you carry too much weight, carry weight unevenly, or use poor lifting technique.    Certain sports. Activities that involve jumping, running, and sudden starts, stops, or changes of direction can jar your neck and head. This may lead to tight muscles and pain. Weightlifting and other activities that require upper body strength can lead to tight neck and shoulder muscles.    Jaw tension. Clenching your jaw or grinding your teeth can result in tension and pain. This may happen while you sleep without your knowing it.    Eye problems. Eyestrain can cause tension in the muscles around the eyes. Or a problem with your eyeglass prescription can make you hold your head at an awkward angle. This can cause neck strain and headaches.    Emotional stress. Many factors lead to emotional stress: overwork, family problems, financial difficulties, or sudden life changes. This may cause muscle tension.  What you can do  Once you know what s causing your headaches, you can work to prevent them. You may need to seek professional help to make some of the needed changes.    Posture and movement changes. Change the setup of your workspace and car. Learn and maintain good posture. Avoid positions that strain your neck or shoulders. Make sure your bedding and pillows provide good support. Avoid sleeping on couches, chairs, or floors when a bed is available.     Lifting and carrying. Learn good lifting technique. Make sure to use the proper tools and equipment for lifting.    Change your sport. Switch to a low-impact sport. To help relieve stress on  your neck and head, cut back on activities that depend on upper body strength or that put a lot of twisting motion on the back, such as golf.     Dental work. See your dentist if you think your headaches are caused by jaw tension or teeth grinding.    New eyewear. Buy an extra pair of glasses adjusted for reading or working at a computer. This helps to reduce eyestrain and keep your neck at a comfortable angle.    Stress management. Learn techniques for relaxing and reducing emotional stress, like deep breathing, visualization, progressive relaxation, and biofeedback. Regular sleep habits can also help to control stress.    Regular sleep and meals. It is important to have a regular sleep cycle and to avoid skipping meals.  Exercise can help  Exercise can improve overall health and help you to relax.    Neck exercises help keep your neck muscles relaxed during the day. Try the neck exercises shown on this sheet. Start in a neutral (relaxed, centered) position. Do 3 repetitions every 2 to 4 hours throughout the day.    Low-impact aerobic exercise helps keep your muscles strong and flexible. This helps prevent tension and the pain it causes.    Stretching, richard chi, and yoga help keep your muscles flexible. They may also help relieve emotional stress.    Lower your left ear toward your left shoulder. Return to neutral. Repeat on the right side.   Lower your chin to your chest and slowly return to neutral.     Look to the left. Return to neutral. Then look to the right.     Move your head forward and back while keeping the top of your head level.   Date Last Reviewed: 11/8/2015 2000-2017 [employer]. [address] [place] [address], [place] [address]. All rights reserved. This information is not intended as a substitute for professional medical care. Always follow your healthcare professional's instructions.

## 2017-12-22 NOTE — PROGRESS NOTES
SUBJECTIVE:   Melissa Coronado is a 62 year old female who presents to clinic today for the following health issues:      ENT Symptoms             Symptoms: cc Present Absent Comment   Fever/Chills  x     Fatigue  x     Muscle Aches  x  Joint pain and headache   Eye Irritation   x    Sneezing  x     Nasal Jatinder/Drg  x     Sinus Pressure/Pain   x    Loss of smell  x     Dental pain  x     Sore Throat  x     Swollen Glands  x     Ear Pain/Fullness   x    Cough  x     Wheeze  x     Chest Pain  x     Shortness of breath   x    Rash   xx    Other         Symptom duration:  3days ago   Symptom severity:  severe   Treatments tried:  none   Contacts:  none       Reviewed and updated as needed this visit by clinical staffTobacco  Allergies  Meds  Problems  Med Hx  Surg Hx  Fam Hx  Soc Hx        Reviewed and updated as needed this visit by Provider  Tobacco  Meds  Problems           Patient Active Problem List   Diagnosis     Hypertension goal BP (blood pressure) < 140/90     Benign essential hypertension     Morbid obesity (H)       History reviewed. No pertinent past medical history.    History reviewed. No pertinent surgical history.    Family History   Problem Relation Age of Onset     Hernia Brother      Other - See Comments Son      suffers from sneezing disorder       Social History   Substance Use Topics     Smoking status: Never Smoker     Smokeless tobacco: Never Used     Alcohol use Yes      Comment: small amt       Current Outpatient Prescriptions   Medication     diltiazem 180 MG 24 hr capsule     azithromycin (ZITHROMAX) 250 MG tablet     metoprolol (TOPROL-XL) 25 MG 24 hr tablet     naproxen sodium (ALEVE) 220 MG tablet     Acetaminophen (TYLENOL EXTRA STRENGTH PO)     No current facility-administered medications for this visit.          ROS:  Constitutional, HEENT, cardiovascular, pulmonary, GI, , musculoskeletal, neuro, skin, endocrine and psych systems are negative, except as otherwise noted.      OBJECTIVE:                                                    /80  Pulse 72  Temp 100.6  F (38.1  C) (Oral)  Resp 22  Ht 5' (1.524 m)  Wt 207 lb (93.9 kg)  SpO2 95%  BMI 40.43 kg/m2     GENERAL APPEARANCE: healthy, alert and in no distress  EYES: Eyes grossly normal to inspection, and conjunctivae and sclerae normal  HENT: head normocephalic and atraumatic and mouth without ulcers or lesions, oropharynx clear and oral mucous membranes moist  NECK: no noticeable adenopathy, no asymmetry, masses, or scars   RESP: lungs clear to auscultation - no rales, rhonchi or wheezes  CV: regular rate and rhythm, normal S1 S2, no S3 or S4, no murmur, click or rub, no peripheral edema and peripheral pulses strong  ABDOMEN: soft, nontender, no hepatosplenomegaly, no masses and bowel sounds normal  MS: no musculoskeletal defects are noted and gait is age appropriate without ataxia  SKIN: no suspicious lesions or rashes  NEURO: mentation intact and speech normal  PSYCH: mentation appears normal and affect normal/bright.    Results for orders placed or performed in visit on 08/21/17   Basic metabolic panel   Result Value Ref Range    Sodium 140 133 - 144 mmol/L    Potassium 3.8 3.4 - 5.3 mmol/L    Chloride 106 94 - 109 mmol/L    Carbon Dioxide 30 20 - 32 mmol/L    Anion Gap 4 3 - 14 mmol/L    Glucose 103 (H) 70 - 99 mg/dL    Urea Nitrogen 17 7 - 30 mg/dL    Creatinine 0.58 0.52 - 1.04 mg/dL    GFR Estimate >90 >60 mL/min/1.7m2    GFR Estimate If Black >90 >60 mL/min/1.7m2    Calcium 9.2 8.5 - 10.1 mg/dL       No results found for this or any previous visit (from the past 744 hour(s)).      ASSESSMENT/PLAN:                                                        ICD-10-CM    1. Bronchitis J40 azithromycin (ZITHROMAX) 250 MG tablet   2. Hypertension goal BP (blood pressure) < 140/90 I10 diltiazem 180 MG 24 hr capsule       Patient Instructions     For your current cold and cough symptoms:  Treat this as you would treat a  regular cold. If your symptoms are not better after a total of 10 days, start to take the antibiotic (Zpack).  If your symptoms become worse in the next few days, start to take the antibiotic. Otherwise, do not take the antibiotic.  Please also measure your temperature every 4 hours and take acetaminophen (Tylenol) or Ibuprofen for a fever of 100.4 degrees F or above.     For your blood pressure:  Please increase the Diltiazem from 120mg daily to 180mg daily (I sent in a new prescription).  Continue your metoprolol.   return to clinic in a month.     For your headaches:  You can retry using the Aleve (Naproxen).   See the tips below, regarding headaches:    Tension Headaches     Massaging your neck muscles can help relieve tension headache pain.     Tension headaches cause a dull, steady pain on both sides of the head and in the neck and the back of the head. The eyes may also feel tired. Tension headaches can be triggered by muscle tension and clenching, lack of sleep, poor posture, eyestrain, stress,depression, anxiety, arthritis of the neck, and other factors.  To help prevent tension headaches  Take the following steps:    Make sure your work area is properly set up to help you avoid neck strain and eyestrain.    Make sure that your eyeglass prescription is current and is appropriate for the work you do.    Learn techniques for relaxing and reducing emotional stress. These include deep breathing, progressive relaxation, yoga, meditation, and biofeedback.    Maintain a regular exercise regimen under the guidance of a doctor. This can help keep your neck and back flexible, strong, and relaxed.  To relieve the pain  Take the following steps:    Use moist heat to relax the muscles. Soak in a hot bath or wrap a warm, moist towel around your neck.    Brush your scalp lightly with a soft hairbrush.    Give yourself a massage. Knead the muscles running from your shoulders up the back of your skull.    Use an ice  pack. Apply this directly to the place where you feel pain.    Rest. Sleeping often helps relieve headache pain.    Drink plenty of fluids. Dehydration is another trigger for headaches.    Use pain medicine sparingly for moderate to severe pain.   Date Last Reviewed: 10/19/2015    6326-9235 [employer]. [address] [place] [address], [place] [address]. All rights reserved. This information is not intended as a substitute for professional medical care. Always follow your healthcare professional's instructions.        Self-Care for Headaches  Most headaches aren't serious and can be relieved with self-care. But some headaches may be a sign of another health problem like eye trouble or high blood pressure. To find the best treatment, learn what kind of headaches you get. For tension headaches, self-care will usually help. To treat migraines, ask your healthcare provider for advice. It is also possible to get both tension and migraine headaches. Self-care involves relieving the pain and avoiding headache  triggers  if you can.    Ways to reduce pain and tension  Try these steps:    Apply a cold compress or ice pack to the pain site.    Drink fluids. If nausea makes it hard to drink, try sucking on ice.    Rest. Protect yourself from bright light and loud noises.    Calm your emotions by imagining a peaceful scene.    Massage tight neck, shoulder, and head muscles.    To relax muscles, soak in a hot bath or use a hot shower.  Use medicines  Aspirin or aspirin substitutes, such as ibuprofen and acetaminophen, can relieve headache. Remember: Never give aspirin to anyone 18 years old or younger because of the risk of developing Reye syndrome. Use pain medicines only when necessary.  Track your headaches  Keeping a headache diary can help you and your healthcare provider identify what's causing your headaches:    Note when each headache happens.    Identify your activities and the foods you've eaten 6 to 8 hours  "before the headache began.    Look for any trends or \"triggers.\"  Signs of tension headache  Any of the following can be signs:    Dull pain or feeling of pressure in a tight band around your head    Pain in your neck or shoulders    Headache without a definite beginning or end    Headache after an activity such as driving or working on a computer  Signs of migraine  Any of the following can be signs:    Throbbing pain on one or both sides of your head    Nausea or vomiting    Extreme sensitivity to light, sound, and smells    Bright spots, flashes, or other visual changes    Pain or nausea so severe that you can't continue your daily activities  Call your healthcare provider   If you have any of the following symptoms, contact your healthcare provider:    A headache that lingers after a recent injury or bump to the head.    A fever with a stiff neck or pain when you bend your head toward your chest.    A headache along with slurred speech, changes in your vision, or numbness or weakness in your arms or legs.    A headache for longer than 3 days.    Frequent headaches, especially in the morning.    Headaches with seizures     Seek immediate medical attention if you have a headache that you would call \"the worst headache you have ever had.\"   Date Last Reviewed: 10/4/2015    3051-2999 [email or web address]. [address] [place] [address], [place] [address]. All rights reserved. This information is not intended as a substitute for professional medical care. Always follow your healthcare professional's instructions.        Preventing Tension Headaches  Lifestyle changes are the key to preventing tension-type headaches. Learn what changes in your environment and daily activities can prevent the strain and tension that lead to headaches. If emotional stress is a factor, stress reduction may bring relief. Other lifestyle changes can help keep you healthier and better able to cope with pain.  What may cause your " headaches  Causes of tension-type headaches include:    Posture and movement. Your posture while you sit, work, drive, and even sleep can put stress on your shoulders and neck. This can tighten muscles in the back of your head, causing headaches.    Lifting and carrying. These can cause strain on your back and neck, especially if you carry too much weight, carry weight unevenly, or use poor lifting technique.    Certain sports. Activities that involve jumping, running, and sudden starts, stops, or changes of direction can jar your neck and head. This may lead to tight muscles and pain. Weightlifting and other activities that require upper body strength can lead to tight neck and shoulder muscles.    Jaw tension. Clenching your jaw or grinding your teeth can result in tension and pain. This may happen while you sleep without your knowing it.    Eye problems. Eyestrain can cause tension in the muscles around the eyes. Or a problem with your eyeglass prescription can make you hold your head at an awkward angle. This can cause neck strain and headaches.    Emotional stress. Many factors lead to emotional stress: overwork, family problems, financial difficulties, or sudden life changes. This may cause muscle tension.  What you can do  Once you know what s causing your headaches, you can work to prevent them. You may need to seek professional help to make some of the needed changes.    Posture and movement changes. Change the setup of your workspace and car. Learn and maintain good posture. Avoid positions that strain your neck or shoulders. Make sure your bedding and pillows provide good support. Avoid sleeping on couches, chairs, or floors when a bed is available.     Lifting and carrying. Learn good lifting technique. Make sure to use the proper tools and equipment for lifting.    Change your sport. Switch to a low-impact sport. To help relieve stress on your neck and head, cut back on activities that depend on upper  body strength or that put a lot of twisting motion on the back, such as golf.     Dental work. See your dentist if you think your headaches are caused by jaw tension or teeth grinding.    New eyewear. Buy an extra pair of glasses adjusted for reading or working at a computer. This helps to reduce eyestrain and keep your neck at a comfortable angle.    Stress management. Learn techniques for relaxing and reducing emotional stress, like deep breathing, visualization, progressive relaxation, and biofeedback. Regular sleep habits can also help to control stress.    Regular sleep and meals. It is important to have a regular sleep cycle and to avoid skipping meals.  Exercise can help  Exercise can improve overall health and help you to relax.    Neck exercises help keep your neck muscles relaxed during the day. Try the neck exercises shown on this sheet. Start in a neutral (relaxed, centered) position. Do 3 repetitions every 2 to 4 hours throughout the day.    Low-impact aerobic exercise helps keep your muscles strong and flexible. This helps prevent tension and the pain it causes.    Stretching, richard chi, and yoga help keep your muscles flexible. They may also help relieve emotional stress.    Lower your left ear toward your left shoulder. Return to neutral. Repeat on the right side.   Lower your chin to your chest and slowly return to neutral.     Look to the left. Return to neutral. Then look to the right.     Move your head forward and back while keeping the top of your head level.   Date Last Reviewed: 11/8/2015 2000-2017 [employer]. [address] [place] [address], [place] [address]. All rights reserved. This information is not intended as a substitute for professional medical care. Always follow your healthcare professional's instructions.            Marina Crandall MD    [address] [phone]

## 2017-12-24 VITALS
HEART RATE: 72 BPM | OXYGEN SATURATION: 95 % | SYSTOLIC BLOOD PRESSURE: 149 MMHG | TEMPERATURE: 100.6 F | WEIGHT: 207 LBS | RESPIRATION RATE: 22 BRPM | BODY MASS INDEX: 40.64 KG/M2 | DIASTOLIC BLOOD PRESSURE: 80 MMHG | HEIGHT: 60 IN

## 2018-01-18 ENCOUNTER — OFFICE VISIT (OUTPATIENT)
Dept: INTERNAL MEDICINE | Facility: CLINIC | Age: 63
End: 2018-01-18
Payer: COMMERCIAL

## 2018-01-18 VITALS
BODY MASS INDEX: 41.03 KG/M2 | SYSTOLIC BLOOD PRESSURE: 148 MMHG | DIASTOLIC BLOOD PRESSURE: 80 MMHG | TEMPERATURE: 98.2 F | HEART RATE: 60 BPM | WEIGHT: 209 LBS | HEIGHT: 60 IN | OXYGEN SATURATION: 96 % | RESPIRATION RATE: 18 BRPM

## 2018-01-18 DIAGNOSIS — I10 HYPERTENSION GOAL BP (BLOOD PRESSURE) < 140/90: Primary | ICD-10-CM

## 2018-01-18 DIAGNOSIS — I10 BENIGN ESSENTIAL HYPERTENSION: ICD-10-CM

## 2018-01-18 DIAGNOSIS — G44.219 EPISODIC TENSION-TYPE HEADACHE, NOT INTRACTABLE: ICD-10-CM

## 2018-01-18 DIAGNOSIS — K59.00 CONSTIPATION, UNSPECIFIED CONSTIPATION TYPE: ICD-10-CM

## 2018-01-18 DIAGNOSIS — E66.01 MORBID OBESITY, UNSPECIFIED OBESITY TYPE (H): ICD-10-CM

## 2018-01-18 DIAGNOSIS — Z23 NEED FOR PROPHYLACTIC VACCINATION AND INOCULATION AGAINST INFLUENZA: ICD-10-CM

## 2018-01-18 DIAGNOSIS — S39.012D BACK STRAIN, SUBSEQUENT ENCOUNTER: ICD-10-CM

## 2018-01-18 DIAGNOSIS — R68.89 COLD SENSITIVITY: ICD-10-CM

## 2018-01-18 LAB
ANION GAP SERPL CALCULATED.3IONS-SCNC: 7 MMOL/L (ref 3–14)
BUN SERPL-MCNC: 11 MG/DL (ref 7–30)
CALCIUM SERPL-MCNC: 9.3 MG/DL (ref 8.5–10.1)
CHLORIDE SERPL-SCNC: 102 MMOL/L (ref 94–109)
CO2 SERPL-SCNC: 30 MMOL/L (ref 20–32)
CREAT SERPL-MCNC: 0.74 MG/DL (ref 0.52–1.04)
GFR SERPL CREATININE-BSD FRML MDRD: 79 ML/MIN/1.7M2
GLUCOSE SERPL-MCNC: 97 MG/DL (ref 70–99)
POTASSIUM SERPL-SCNC: 4.1 MMOL/L (ref 3.4–5.3)
SODIUM SERPL-SCNC: 139 MMOL/L (ref 133–144)
TSH SERPL DL<=0.005 MIU/L-ACNC: 1.22 MU/L (ref 0.4–4)

## 2018-01-18 PROCEDURE — 84443 ASSAY THYROID STIM HORMONE: CPT | Performed by: INTERNAL MEDICINE

## 2018-01-18 PROCEDURE — 36415 COLL VENOUS BLD VENIPUNCTURE: CPT | Performed by: INTERNAL MEDICINE

## 2018-01-18 PROCEDURE — 99214 OFFICE O/P EST MOD 30 MIN: CPT | Mod: 25 | Performed by: INTERNAL MEDICINE

## 2018-01-18 PROCEDURE — 90471 IMMUNIZATION ADMIN: CPT | Performed by: INTERNAL MEDICINE

## 2018-01-18 PROCEDURE — 80048 BASIC METABOLIC PNL TOTAL CA: CPT | Performed by: INTERNAL MEDICINE

## 2018-01-18 PROCEDURE — 90686 IIV4 VACC NO PRSV 0.5 ML IM: CPT | Performed by: INTERNAL MEDICINE

## 2018-01-18 RX ORDER — DILTIAZEM HYDROCHLORIDE 180 MG/1
180 CAPSULE, COATED, EXTENDED RELEASE ORAL DAILY
Qty: 90 CAPSULE | Refills: 1 | Status: SHIPPED | OUTPATIENT
Start: 2018-01-18 | End: 2018-08-03

## 2018-01-18 RX ORDER — ACETAMINOPHEN 325 MG/1
650 TABLET ORAL EVERY 6 HOURS PRN
Qty: 100 TABLET | Refills: 0 | Status: SHIPPED | OUTPATIENT
Start: 2018-01-18 | End: 2018-08-03

## 2018-01-18 RX ORDER — METOPROLOL SUCCINATE 25 MG/1
12.5 TABLET, EXTENDED RELEASE ORAL DAILY
Qty: 45 TABLET | Refills: 1 | Status: SHIPPED | OUTPATIENT
Start: 2018-01-18 | End: 2018-08-03

## 2018-01-18 NOTE — PATIENT INSTRUCTIONS
We will let you know your test results as discussed: by phone for urgent results, otherwise by postal mail.    For your blood pressure:  Continue your current medications.  return to clinic in 6 months.     For your chronic back pain and knee pain:  Please try to take acetaminophen as 650mg every 4 hours as needed for pain.  You may continue Naproxen (Aleve) 220mg daily for now.   Let us know if your back pain is not better in 2-3 weeks.

## 2018-01-18 NOTE — MR AVS SNAPSHOT
After Visit Summary   1/18/2018    Melissa Coronado    MRN: [id number]           Patient Information     Date Of Birth          1955        Visit Information        Provider Department      1/18/2018 10:10 AM Marina Crandall MD [facility]        Today's Diagnoses     Hypertension goal BP (blood pressure) < 140/90    -  1    Need for prophylactic vaccination and inoculation against influenza        Benign essential hypertension        Episodic tension-type headache, not intractable        Cold sensitivity        Constipation, unspecified constipation type        Back strain, subsequent encounter          Care Instructions    We will let you know your test results as discussed: by phone for urgent results, otherwise by postal mail.    For your blood pressure:  Continue your current medications.  return to clinic in 6 months.     For your chronic back pain and knee pain:  Please try to take acetaminophen as 650mg every 4 hours as needed for pain.  You may continue Naproxen (Aleve) 220mg daily for now.   Let us know if your back pain is not better in 2-3 weeks.          Follow-ups after your visit        Your next 10 appointments already scheduled     Jan 18, 2018 10:10 AM CST   [facility] with [facility]all [facility]    [address] [facility] [address] [phone]           Bring a current list of meds and any records pertaining to this visit. For Physicals, please bring immunization records and any forms needing to be filled out. Please arrive 10 minutes early to complete paperwork.              Who to contact     If you have questions or need follow up information about today's clinic visit or your schedule please contact [facility] directly at [phone].  Normal or non-critical lab and imaging results will be communicated to you by MyChart, letter or phone within 4 business days  "after the clinic has received the results. If you do not hear from us within 7 days, please contact the clinic through TAKO or phone. If you have a critical or abnormal lab result, we will notify you by phone as soon as possible.  Submit refill requests through TAKO or call your pharmacy and they will forward the refill request to us. Please allow 3 business days for your refill to be completed.          Additional Information About Your Visit        TAKO Information     TAKO lets you send messages to your doctor, view your test results, renew your prescriptions, schedule appointments and more. To sign up, go to [email or web address] . Click on \"Log in\" on the left side of the screen, which will take you to the Welcome page. Then click on \"Sign up Now\" on the right side of the page.     You will be asked to enter the access code listed below, as well as some personal information. Please follow the directions to create your username and password.     Your access code is: [email or web address]  Expires: 3/22/2018 10:44 AM     Your access code will  in 90 days. If you need help or a new code, please call your [place] clinic or [phone].        Care EveryWhere ID     This is your Care EveryWhere ID. This could be used by other organizations to access your [place] medical records  [id number]        Your Vitals Were     Pulse Temperature Respirations Height Pulse Oximetry BMI (Body Mass Index)    60 98.2  F (36.8  C) (Oral) 18 5' (1.524 m) 96% 40.82 kg/m2       Blood Pressure from Last 3 Encounters:   18 148/80   17 149/80   17 148/68    Weight from Last 3 Encounters:   18 209 lb (94.8 kg)   17 207 lb (93.9 kg)   17 212 lb 9.6 oz (96.4 kg)              We Performed the Following     Basic metabolic panel     FLU VAC, SPLIT VIRUS IM > 3 YO (QUADRIVALENT) [02339]     TSH with free T4 reflex     Vaccine Administration, Initial [21774]          Today's Medication Changes "          These changes are accurate as of: 1/18/18  9:52 AM.  If you have any questions, ask your nurse or doctor.               These medicines have changed or have updated prescriptions.        Dose/Directions    * TYLENOL EXTRA STRENGTH PO   This may have changed:  Another medication with the same name was added. Make sure you understand how and when to take each.   Changed by:  Onel Parson MD        Dose:  500 mg   Take 500 mg by mouth Reported on 4/13/2017   Refills:  0       * acetaminophen 325 MG tablet   Commonly known as:  TYLENOL   This may have changed:  You were already taking a medication with the same name, and this prescription was added. Make sure you understand how and when to take each.   Used for:  Back strain, subsequent encounter   Changed by:  Marina Crandall MD        Dose:  650 mg   Take 2 tablets (650 mg) by mouth every 6 hours as needed for mild pain   Quantity:  100 tablet   Refills:  0       * Notice:  This list has 2 medication(s) that are the same as other medications prescribed for you. Read the directions carefully, and ask your doctor or other care provider to review them with you.      Stop taking these medicines if you haven't already. Please contact your care team if you have questions.     azithromycin 250 MG tablet   Commonly known as:  ZITHROMAX   Stopped by:  Marina Crandall MD                Where to get your medicines      These medications were sent to [facility]/pharmacy #[id number] - [place] - [address] [facility]. [address] [place] [address]     Phone:  [phone]     acetaminophen 325 MG tablet    diltiazem 180 MG 24 hr capsule    metoprolol succinate 25 MG 24 hr tablet                Primary Care Provider Office Phone # Fax #    Marina Crandall [phone] [address] [facility] [address]        Equal Access to Services     DASH MAJOR AH: Juan Francisco alaniz  Elsaali, sincereda luqadaha, qajayta kaalvikki celaya, leonila pateljena trevor. So [facility] [phone].    ATENCIÓN: Si timbo andrade, tiene a sprague disposición servicios gratuitos de asistencia lingüística. Veda al [phone].    We comply with applicable federal civil rights laws and [place] laws. We do not discriminate on the basis of race, color, national origin, age, disability, sex, sexual orientation, or gender identity.            Thank you!     Thank you for [facility]  [facility]. Our goal is always to provide you with excellent care. Hearing back from our patients is one way we can continue to improve our services. Please take a few minutes to complete the written survey that you may receive in the mail after your visit with us. Thank you!             Your Updated Medication List - Protect others around you: Learn how to safely use, store and throw away your medicines at www.disposemymeds.org.          This list is accurate as of: 1/18/18  9:52 AM.  Always use your most recent med list.                   Brand Name Dispense Instructions for use Diagnosis    ALEVE 220 MG tablet   Generic drug:  naproxen sodium      Take by mouth 2 times daily (with meals)        diltiazem 180 MG 24 hr capsule     90 capsule    Take 1 capsule (180 mg) by mouth daily    Hypertension goal BP (blood pressure) < 140/90       metoprolol succinate 25 MG 24 hr tablet    TOPROL-XL    45 tablet    Take 0.5 tablets (12.5 mg) by mouth daily    Benign essential hypertension, Episodic tension-type headache, not intractable       * TYLENOL EXTRA STRENGTH PO      Take 500 mg by mouth Reported on 4/13/2017        * acetaminophen 325 MG tablet    TYLENOL    100 tablet    Take 2 tablets (650 mg) by mouth every 6 hours as needed for mild pain    Back strain, subsequent encounter       * Notice:  This list has 2 medication(s) that are the same as other medications prescribed for you. Read the  directions carefully, and ask your doctor or other care provider to review them with you.

## 2018-01-18 NOTE — LETTER
January 19, 2018    Melissa Coronado  [address]  [place] [address]          Dear Melissa,     Your thyroid hormone level is within normal limits.   Your kidney function and blood electrolytes are within normal limits.     Please continue the treatment plan that we discussed at your last clinic visit and let me know if you have any questions via MarketArt or by calling [phone].         Marina Crandall MD/amada      Results for orders placed or performed in visit on 01/18/18   TSH with free T4 reflex   Result Value Ref Range    TSH 1.22 0.40 - 4.00 mU/L   Basic metabolic panel   Result Value Ref Range    Sodium 139 133 - 144 mmol/L    Potassium 4.1 3.4 - 5.3 mmol/L    Chloride 102 94 - 109 mmol/L    Carbon Dioxide 30 20 - 32 mmol/L    Anion Gap 7 3 - 14 mmol/L    Glucose 97 70 - 99 mg/dL    Urea Nitrogen 11 7 - 30 mg/dL    Creatinine 0.74 0.52 - 1.04 mg/dL    GFR Estimate 79 >60 mL/min/1.7m2    GFR Estimate If Black >90 >60 mL/min/1.7m2    Calcium 9.3 8.5 - 10.1 mg/dL

## 2018-01-18 NOTE — PROGRESS NOTES
SUBJECTIVE:   Melissa Coronado is a 62 year old female who presents to clinic today for the following health issues:      Hypertension Follow-up      Outpatient blood pressures are being checked at home.  Results are 126/71 this am reading .    Low Salt Diet: low salt        Amount of exercise or physical activity: None    Problems taking medications regularly: No    Medication side effects: none    Diet: low salt    Patient increased her diltiazem from 120 to 180mg/d a month ago.  Her headaches have also improved.   Patient denies chest pain, chest pressure, shortness of breath, palpitations, headaches,  or any noted lower extremity swelling.     She has vision symptoms from her glaucoma and is ising eye drops daily. She follows with an Ophthalmologist.       Reviewed and updated as needed this visit by clinical staffTobacco  Allergies  Meds  Problems       Reviewed and updated as needed this visit by Provider  Tobacco  Meds  Problems           Patient Active Problem List   Diagnosis     Hypertension goal BP (blood pressure) < 140/90     Benign essential hypertension     Morbid obesity (H)       No past medical history on file.    No past surgical history on file.    Family History   Problem Relation Age of Onset     Hernia Brother      Other - See Comments Son      suffers from sneezing disorder       Social History   Substance Use Topics     Smoking status: Never Smoker     Smokeless tobacco: Never Used     Alcohol use Yes      Comment: small amt       Current Outpatient Prescriptions   Medication     diltiazem 180 MG 24 hr capsule     metoprolol succinate (TOPROL-XL) 25 MG 24 hr tablet     acetaminophen (TYLENOL) 325 MG tablet     naproxen sodium (ALEVE) 220 MG tablet     Acetaminophen (TYLENOL EXTRA STRENGTH PO)     No current facility-administered medications for this visit.          ROS:  Constitutional, HEENT, cardiovascular, pulmonary, GI, , musculoskeletal, neuro, skin, endocrine and psych systems  are negative, except as otherwise noted.     OBJECTIVE:                                                    /80  Pulse 60  Temp 98.2  F (36.8  C) (Oral)  Resp 18  Ht 5' (1.524 m)  Wt 209 lb (94.8 kg)  SpO2 96%  BMI 40.82 kg/m2     GENERAL APPEARANCE: healthy, alert and in no distress  EYES: Eyes grossly normal to inspection, and conjunctivae and sclerae normal  HENT: head normocephalic and atraumatic and mouth without ulcers or lesions, oropharynx clear and oral mucous membranes moist  NECK: no noticeable adenopathy, no asymmetry, masses, or scars   RESP: lungs clear to auscultation - no rales, rhonchi or wheezes  CV: regular rate and rhythm, normal S1 S2, no S3 or S4, no murmur, click or rub, no peripheral edema and peripheral pulses strong  ABDOMEN: soft, nontender, no hepatosplenomegaly, no masses and bowel sounds normal  MS: no musculoskeletal defects are noted and gait is age appropriate without ataxia  SKIN: no suspicious lesions or rashes  NEURO: mentation intact and speech normal  PSYCH: mentation appears normal and affect normal/bright.    Results for orders placed or performed in visit on 01/18/18   TSH with free T4 reflex   Result Value Ref Range    TSH 1.22 0.40 - 4.00 mU/L   Basic metabolic panel   Result Value Ref Range    Sodium 139 133 - 144 mmol/L    Potassium 4.1 3.4 - 5.3 mmol/L    Chloride 102 94 - 109 mmol/L    Carbon Dioxide 30 20 - 32 mmol/L    Anion Gap 7 3 - 14 mmol/L    Glucose 97 70 - 99 mg/dL    Urea Nitrogen 11 7 - 30 mg/dL    Creatinine 0.74 0.52 - 1.04 mg/dL    GFR Estimate 79 >60 mL/min/1.7m2    GFR Estimate If Black >90 >60 mL/min/1.7m2    Calcium 9.3 8.5 - 10.1 mg/dL       No results found for this or any previous visit (from the past 744 hour(s)).      ASSESSMENT/PLAN:                                                        ICD-10-CM    1. Hypertension goal BP (blood pressure) < 140/90 I10 diltiazem 180 MG 24 hr capsule   2. Morbid obesity, unspecified obesity type (H)  E66.01    3. Episodic tension-type headache, not intractable G44.219 metoprolol succinate (TOPROL-XL) 25 MG 24 hr tablet   4. Cold sensitivity R68.89 TSH with free T4 reflex   5. Constipation, unspecified constipation type K59.00 TSH with free T4 reflex   6. Back strain, subsequent encounter S39.012D acetaminophen (TYLENOL) 325 MG tablet   7. Benign essential hypertension I10 metoprolol succinate (TOPROL-XL) 25 MG 24 hr tablet     Basic metabolic panel   8. Need for prophylactic vaccination and inoculation against influenza Z23 FLU VAC, SPLIT VIRUS IM > 3 YO (QUADRIVALENT) [81765]     Vaccine Administration, Initial [98985]     1: controlled, symptoms improved. PLAN: Continue current regimen.   2:   Body mass index is 40.82 kg/(m^2).   Wt Readings from Last 4 Encounters:   01/18/18 209 lb (94.8 kg)   12/22/17 207 lb (93.9 kg)   08/21/17 212 lb 9.6 oz (96.4 kg)   08/08/17 216 lb (98 kg)     Patient has been lost 7 pounds in the past 6 months;  PLAN: will continue to encourage to strive towards a healthy diet and regular exercise.  3: improved. PLAN: Continue current regimen.  4,5: TSH within normal limits. PLAN: will continue to monitor   6: lumbago. PLAN: conservative treatment advised  Patient Instructions   We will let you know your test results as discussed: by phone for urgent results, otherwise by postal mail.    For your blood pressure:  Continue your current medications.  return to clinic in 6 months.     For your chronic back pain and knee pain:  Please try to take acetaminophen as 650mg every 4 hours as needed for pain.  You may continue Naproxen (Aleve) 220mg daily for now.   Let us know if your back pain is not better in 2-3 weeks.      Marina Crandall MD    [facility]  [address] [facility] [address] [phone]      Injectable Influenza Immunization Documentation    1.  Is the person to be vaccinated sick today?   No    2. Does the person to be  vaccinated have an allergy to a component   of the vaccine?   No  Egg Allergy Algorithm Link    3. Has the person to be vaccinated ever had a serious reaction   to influenza vaccine in the past?   No    4. Has the person to be vaccinated ever had Guillain-Barré syndrome?   No    Form completed by [facility]   9:16 AM  1/18/2018

## 2018-01-19 NOTE — PROGRESS NOTES
Please send letter informing the patient and attach results:    Dear Melissa,     Your thyroid hormone level is within normal limits.   Your kidney function and blood electrolytes are within normal limits.     Please continue the treatment plan that we discussed at your last clinic visit and let me know if you have any questions via NoWait or by calling [phone].        Marina Crandall MD

## 2018-03-16 ENCOUNTER — OFFICE VISIT (OUTPATIENT)
Dept: INTERNAL MEDICINE | Facility: CLINIC | Age: 63
End: 2018-03-16
Payer: COMMERCIAL

## 2018-03-16 VITALS
DIASTOLIC BLOOD PRESSURE: 80 MMHG | SYSTOLIC BLOOD PRESSURE: 148 MMHG | RESPIRATION RATE: 20 BRPM | HEIGHT: 60 IN | TEMPERATURE: 96.8 F | OXYGEN SATURATION: 98 % | WEIGHT: 212 LBS | HEART RATE: 62 BPM | BODY MASS INDEX: 41.62 KG/M2

## 2018-03-16 DIAGNOSIS — R73.03 PREDIABETES: ICD-10-CM

## 2018-03-16 DIAGNOSIS — I10 BENIGN ESSENTIAL HYPERTENSION: ICD-10-CM

## 2018-03-16 DIAGNOSIS — Z12.11 SPECIAL SCREENING FOR MALIGNANT NEOPLASMS, COLON: ICD-10-CM

## 2018-03-16 DIAGNOSIS — E66.01 MORBID OBESITY (H): Primary | ICD-10-CM

## 2018-03-16 LAB
ANION GAP SERPL CALCULATED.3IONS-SCNC: 6 MMOL/L (ref 3–14)
BUN SERPL-MCNC: 12 MG/DL (ref 7–30)
CALCIUM SERPL-MCNC: 9.4 MG/DL (ref 8.5–10.1)
CHLORIDE SERPL-SCNC: 105 MMOL/L (ref 94–109)
CO2 SERPL-SCNC: 28 MMOL/L (ref 20–32)
CREAT SERPL-MCNC: 0.68 MG/DL (ref 0.52–1.04)
GFR SERPL CREATININE-BSD FRML MDRD: 87 ML/MIN/1.7M2
GLUCOSE SERPL-MCNC: 102 MG/DL (ref 70–99)
HBA1C MFR BLD: 6.1 % (ref 4.3–6)
POTASSIUM SERPL-SCNC: 3.8 MMOL/L (ref 3.4–5.3)
SODIUM SERPL-SCNC: 139 MMOL/L (ref 133–144)

## 2018-03-16 PROCEDURE — 36415 COLL VENOUS BLD VENIPUNCTURE: CPT | Performed by: INTERNAL MEDICINE

## 2018-03-16 PROCEDURE — 99214 OFFICE O/P EST MOD 30 MIN: CPT | Performed by: INTERNAL MEDICINE

## 2018-03-16 PROCEDURE — 83036 HEMOGLOBIN GLYCOSYLATED A1C: CPT | Performed by: INTERNAL MEDICINE

## 2018-03-16 PROCEDURE — 80048 BASIC METABOLIC PNL TOTAL CA: CPT | Performed by: INTERNAL MEDICINE

## 2018-03-16 ASSESSMENT — PAIN SCALES - GENERAL: PAINLEVEL: NO PAIN (0)

## 2018-03-16 NOTE — MR AVS SNAPSHOT
"              After Visit Summary   3/16/2018    Melissa Coronado    MRN: [id number]           Patient Information     Date Of Birth          1955        Visit Information        Provider Department      3/16/2018 11:50 AM Marina Crandall MD [facility]        Today's Diagnoses     Morbid obesity (H)    -  1    Benign essential hypertension        Special screening for malignant neoplasms, colon        Prediabetes          Care Instructions    You do NOT have diabetes.   You do have prediabetes-see below for more information about this.  Please also start metformin, it will help your prediabetes and it may also help you lose weight, which may inturn improve your blood pressures, too.    We will see you in 7/2018    Your blood test shows that you have \"Prediabetes,\" which means that you have a higher than average chance of developing diabetes in the future.      Healthy habits described below will help you treat this condition:     Please make lifestyle changes to reduce the chance that you will get full-blown diabetes. Here's what you should do:   Lose weight - Losing 5 to 10 percent of your body weight can lower your risk a lot. If you weigh 200 pounds, that means you should lose 10 to 20 pounds. If you weigh 150 pounds, that means you should lose 7 to 15 pounds.   Eat right - Choose a diet rich in fruits, vegetables, and low-fat dairy products, but low in meats, sweets, and refined grains. Stay away from sweet drinks, like soda and juice.   Be active for 30 minutes a day - You don't have to go to the gym or break a sweat to get a benefit. Walking, gardening, and dancing are all activities that can help.   Quit smoking - If you smoke, ask your doctor or nurse for advice on how to quit. People are much more likely to succeed if they have help and get medicines to help them quit.   Take your medicines - If your doctor or nurse prescribed any medicines, take them every day, as directed. That " "goes for medicines to prevent diabetes, and for ones to lower blood pressure or cholesterol. People with pre-diabetes have a higher-than-average risk of heart attacks, strokes, and other problems, so those medicines are important.            Follow-ups after your visit        Your next 10 appointments already scheduled     Jul 20, 2018  8:50 AM CDT   Office Visit with Marina Crandall MD   [facility]    [address] [facility] [address] [phone]           Bring a current list of meds and any records pertaining to this visit. For Physicals, please bring immunization records and any forms needing to be filled out. Please arrive 10 minutes early to complete paperwork.              Future tests that were ordered for you today     Open Future Orders        Priority Expected Expires Ordered    Fecal colorectal cancer screen (FIT) Routine 4/6/2018 6/8/2018 3/16/2018            Who to contact     If you have questions or need follow up information about today's clinic visit or your schedule please contact [facility] directly at [phone].  Normal or non-critical lab and imaging results will be communicated to you by Newsanahart, letter or phone within 4 business days after the clinic has received the results. If you do not hear from us within 7 days, please contact the clinic through Polaris [employer] Systemst or phone. If you have a critical or abnormal lab result, we will notify you by phone as soon as possible.  Submit refill requests through Upverter or call your pharmacy and they will forward the refill request to us. Please allow 3 business days for your refill to be completed.          Additional Information About Your Visit        NewsanaharConscious Box Information     Upverter lets you send messages to your doctor, view your test results, renew your prescriptions, schedule appointments and more. To sign up, go to [email or web address] . Click on \"Log in\" on the left " "side of the screen, which will take you to the Welcome page. Then click on \"Sign up Now\" on the right side of the page.     You will be asked to enter the access code listed below, as well as some personal information. Please follow the directions to create your username and password.     Your access code is: [email or web address]  Expires: 3/22/2018 11:44 AM     Your access code will  in 90 days. If you need help or a new code, please call your [facility] or [phone].        Care EveryWhere ID     This is your Care EveryWhere ID. This could be used by other organizations to access your [facility] medical records  [id number]        Your Vitals Were     Pulse Temperature Respirations Height Pulse Oximetry BMI (Body Mass Index)    62 96.8  F (36  C) (Oral) 20 5' (1.524 m) 98% 41.4 kg/m2       Blood Pressure from Last 3 Encounters:   18 148/80   18 148/80   17 149/80    Weight from Last 3 Encounters:   18 212 lb (96.2 kg)   18 209 lb (94.8 kg)   17 207 lb (93.9 kg)              We Performed the Following     Basic metabolic panel     Hemoglobin A1c          Today's Medication Changes          These changes are accurate as of 3/16/18 12:52 PM.  If you have any questions, ask your nurse or doctor.               Start taking these medicines.        Dose/Directions    metFORMIN 500 MG tablet   Commonly known as:  GLUCOPHAGE   Used for:  Prediabetes   Started by:  Marina Crandall MD        Dose:  500 mg   Take 1 tablet (500 mg) by mouth daily (with breakfast)   Quantity:  90 tablet   Refills:  1            Where to get your medicines      These medications were sent to [facility]/pharmacy #[id number] - [place] -  [place]   [place] [address] [place] [address]     Phone:  [phone]     metFORMIN 500 MG tablet                Primary Care Provider Office Phone # Fax #    Marina Crandall [phone]       " [address] [facility] [address]        Equal Access to Services     JOHN MAJOR : Hadii deborah matamoros corbin Castañeda, wajudyda luqpamha, qajayta kakeyonnasherie huynhshahlasherie, leonila renecarmenshirley murray. So [facility] [phone].    ATENCIÓN: Si habla español, tiene a sprague disposición servicios gratuitos de asistencia lingüística. Llame al [phone].    We comply with applicable federal civil rights laws and [place] laws. We do not discriminate on the basis of race, color, national origin, age, disability, sex, sexual orientation, or gender identity.            Thank you!     Thank you for choosing [facility]  for your care. Our goal is always to provide you with excellent care. Hearing back from our patients is one way we can continue to improve our services. Please take a few minutes to complete the written survey that you may receive in the mail after your visit with us. Thank you!             Your Updated Medication List - Protect others around you: Learn how to safely use, store and throw away your medicines at www.disposemymeds.org.          This list is accurate as of 3/16/18 12:52 PM.  Always use your most recent med list.                   Brand Name Dispense Instructions for use Diagnosis    ALEVE 220 MG tablet   Generic drug:  naproxen sodium      Take by mouth 2 times daily (with meals)        diltiazem 180 MG 24 hr capsule     90 capsule    Take 1 capsule (180 mg) by mouth daily    Hypertension goal BP (blood pressure) < 140/90       metFORMIN 500 MG tablet    GLUCOPHAGE    90 tablet    Take 1 tablet (500 mg) by mouth daily (with breakfast)    Prediabetes       metoprolol succinate 25 MG 24 hr tablet    TOPROL-XL    45 tablet    Take 0.5 tablets (12.5 mg) by mouth daily    Benign essential hypertension, Episodic tension-type headache, not intractable       * TYLENOL EXTRA STRENGTH PO      Take 500 mg by mouth Reported on 4/13/2017        * acetaminophen 325 MG tablet    TYLENOL    100  tablet    Take 2 tablets (650 mg) by mouth every 6 hours as needed for mild pain    Back strain, subsequent encounter       * Notice:  This list has 2 medication(s) that are the same as other medications prescribed for you. Read the directions carefully, and ask your doctor or other care provider to review them with you.

## 2018-03-16 NOTE — LETTER
Re patient: Melissa Coronado : 1955.    To whom it may concern:    The aforementioned patient does not have evidence of diabetes, as evidence by today's Hemoglobin A1C of 6.1%.    Please let me know if you have any questions for me.    Marina Crandall MD

## 2018-03-16 NOTE — PROGRESS NOTES
SUBJECTIVE:   Melissa Coronado is a 63 year old female who presents to clinic today for the following health issues:      Pt needs a note for work confirming that she does not have diabetes:  No symptoms of hyperglycemia.  No chest pain.  No vision changes.        Reviewed and updated as needed this visit by clinical staff  Tobacco  Allergies  Meds  Problems       Reviewed and updated as needed this visit by Provider  Tobacco  Meds  Problems           Patient Active Problem List   Diagnosis     Hypertension goal BP (blood pressure) < 140/90     Benign essential hypertension     Morbid obesity (H)       No past medical history on file.    No past surgical history on file.    Family History   Problem Relation Age of Onset     Hernia Brother      Other - See Comments Son      suffers from sneezing disorder       Social History   Substance Use Topics     Smoking status: Never Smoker     Smokeless tobacco: Never Used     Alcohol use Yes      Comment: small amt       Current Outpatient Prescriptions   Medication     metFORMIN (GLUCOPHAGE) 500 MG tablet     diltiazem 180 MG 24 hr capsule     metoprolol succinate (TOPROL-XL) 25 MG 24 hr tablet     acetaminophen (TYLENOL) 325 MG tablet     naproxen sodium (ALEVE) 220 MG tablet     Acetaminophen (TYLENOL EXTRA STRENGTH PO)     No current facility-administered medications for this visit.          ROS:  Constitutional, HEENT, cardiovascular, pulmonary, GI, , musculoskeletal, neuro, skin, endocrine and psych systems are negative, except as otherwise noted.     OBJECTIVE:                                                    /80  Pulse 62  Temp 96.8  F (36  C) (Oral)  Resp 20  Ht 5' (1.524 m)  Wt 212 lb (96.2 kg)  SpO2 98%  BMI 41.4 kg/m2     GENERAL APPEARANCE: healthy, alert and in no distress  EYES: Eyes grossly normal to inspection, and conjunctivae and sclerae normal  HENT: head normocephalic and atraumatic and mouth without ulcers or lesions, oropharynx  clear and oral mucous membranes moist  NECK: no noticeable adenopathy, no asymmetry, masses, or scars   RESP: lungs clear to auscultation - no rales, rhonchi or wheezes  CV: regular rate and rhythm, normal S1 S2, no S3 or S4, no murmur, click or rub, no peripheral edema and peripheral pulses strong  ABDOMEN: soft, nontender, no hepatosplenomegaly, no masses and bowel sounds normal  MS: no musculoskeletal defects are noted and gait is age appropriate without ataxia  SKIN: no suspicious lesions or rashes  NEURO: mentation intact and speech normal  PSYCH: mentation appears normal and affect normal/bright.    Results for orders placed or performed in visit on 03/16/18   Basic metabolic panel   Result Value Ref Range    Sodium 139 133 - 144 mmol/L    Potassium 3.8 3.4 - 5.3 mmol/L    Chloride 105 94 - 109 mmol/L    Carbon Dioxide 28 20 - 32 mmol/L    Anion Gap 6 3 - 14 mmol/L    Glucose 102 (H) 70 - 99 mg/dL    Urea Nitrogen 12 7 - 30 mg/dL    Creatinine 0.68 0.52 - 1.04 mg/dL    GFR Estimate 87 >60 mL/min/1.7m2    GFR Estimate If Black >90 >60 mL/min/1.7m2    Calcium 9.4 8.5 - 10.1 mg/dL   Hemoglobin A1c   Result Value Ref Range    Hemoglobin A1C 6.1 (H) 4.3 - 6.0 %       No results found for this or any previous visit (from the past 744 hour(s)).      ASSESSMENT/PLAN:                                                        ICD-10-CM    1. Morbid obesity (H) E66.01 Hemoglobin A1c     CANCELED: Lipid panel reflex to direct LDL Fasting   2. Benign essential hypertension I10 Basic metabolic panel   3. Special screening for malignant neoplasms, colon Z12.11 Fecal colorectal cancer screen (FIT)   4. Prediabetes R73.03 metFORMIN (GLUCOPHAGE) 500 MG tablet       Patient Instructions   You do NOT have diabetes.   You do have prediabetes-see below for more information about this.  Please also start metformin, it will help your prediabetes and it may also help you lose weight, which may inturn improve your blood pressures,  "too.    We will see you in 7/2018    Your blood test shows that you have \"Prediabetes,\" which means that you have a higher than average chance of developing diabetes in the future.      Healthy habits described below will help you treat this condition:     Please make lifestyle changes to reduce the chance that you will get full-blown diabetes. Here's what you should do:   Lose weight - Losing 5 to 10 percent of your body weight can lower your risk a lot. If you weigh 200 pounds, that means you should lose 10 to 20 pounds. If you weigh 150 pounds, that means you should lose 7 to 15 pounds.   Eat right - Choose a diet rich in fruits, vegetables, and low-fat dairy products, but low in meats, sweets, and refined grains. Stay away from sweet drinks, like soda and juice.   Be active for 30 minutes a day - You don't have to go to the gym or break a sweat to get a benefit. Walking, gardening, and dancing are all activities that can help.   Quit smoking - If you smoke, ask your doctor or nurse for advice on how to quit. People are much more likely to succeed if they have help and get medicines to help them quit.   Take your medicines - If your doctor or nurse prescribed any medicines, take them every day, as directed. That goes for medicines to prevent diabetes, and for ones to lower blood pressure or cholesterol. People with pre-diabetes have a higher-than-average risk of heart attacks, strokes, and other problems, so those medicines are important.        Marina Crandall MD    [facility]  [address] [facility] [address] [phone]    "

## 2018-03-16 NOTE — PATIENT INSTRUCTIONS
"You do NOT have diabetes.   You do have prediabetes-see below for more information about this.  Please also start metformin, it will help your prediabetes and it may also help you lose weight, which may inturn improve your blood pressures, too.    We will see you in 7/2018    Your blood test shows that you have \"Prediabetes,\" which means that you have a higher than average chance of developing diabetes in the future.      Healthy habits described below will help you treat this condition:     Please make lifestyle changes to reduce the chance that you will get full-blown diabetes. Here's what you should do:   Lose weight - Losing 5 to 10 percent of your body weight can lower your risk a lot. If you weigh 200 pounds, that means you should lose 10 to 20 pounds. If you weigh 150 pounds, that means you should lose 7 to 15 pounds.   Eat right - Choose a diet rich in fruits, vegetables, and low-fat dairy products, but low in meats, sweets, and refined grains. Stay away from sweet drinks, like soda and juice.   Be active for 30 minutes a day - You don't have to go to the gym or break a sweat to get a benefit. Walking, gardening, and dancing are all activities that can help.   Quit smoking - If you smoke, ask your doctor or nurse for advice on how to quit. People are much more likely to succeed if they have help and get medicines to help them quit.   Take your medicines - If your doctor or nurse prescribed any medicines, take them every day, as directed. That goes for medicines to prevent diabetes, and for ones to lower blood pressure or cholesterol. People with pre-diabetes have a higher-than-average risk of heart attacks, strokes, and other problems, so those medicines are important.    "

## 2018-03-16 NOTE — LETTER
March 19, 2018    Melissa Coronado  [address]  [place] [address]          Dear Melissa,     Your kidney function and blood electrolytes are within normal limits for you.  Your hemoglobin A1C shows prediabetes, which was discussed at your last clinic visit.      Please use [employer] or call our clinic at [phone] if you have any questions.       Marina Crandall MD/amada    Results for orders placed or performed in visit on 03/16/18   Basic metabolic panel   Result Value Ref Range    Sodium 139 133 - 144 mmol/L    Potassium 3.8 3.4 - 5.3 mmol/L    Chloride 105 94 - 109 mmol/L    Carbon Dioxide 28 20 - 32 mmol/L    Anion Gap 6 3 - 14 mmol/L    Glucose 102 (H) 70 - 99 mg/dL    Urea Nitrogen 12 7 - 30 mg/dL    Creatinine 0.68 0.52 - 1.04 mg/dL    GFR Estimate 87 >60 mL/min/1.7m2    GFR Estimate If Black >90 >60 mL/min/1.7m2    Calcium 9.4 8.5 - 10.1 mg/dL   Hemoglobin A1c   Result Value Ref Range    Hemoglobin A1C 6.1 (H) 4.3 - 6.0 %

## 2018-03-19 ENCOUNTER — TELEPHONE (OUTPATIENT)
Dept: FAMILY MEDICINE | Facility: CLINIC | Age: 63
End: 2018-03-19

## 2018-03-19 PROCEDURE — 82274 ASSAY TEST FOR BLOOD FECAL: CPT | Performed by: INTERNAL MEDICINE

## 2018-03-19 NOTE — PROGRESS NOTES
Please send letter informing the patient and attach results:    Dear Melissa,     Your kidney function and blood electrolytes are within normal limits for you.  Your hemoglobin A1C shows prediabetes, which was discussed at your last clinic visit.      Please use [employer] or call our clinic at [phone] if you have any questions.       Marina Crandall MD

## 2018-03-19 NOTE — TELEPHONE ENCOUNTER
Patient did not have a skin test called the PPD t test for TB (tuberculosis) per chart review.     Could you call and see if she truly is needing an appointment with ancillary MA for mantoux placement?   Thank you, RENÉ AshN RN

## 2018-03-19 NOTE — TELEPHONE ENCOUNTER
I called and spoke to the patient.  She said that she needs a Mantoux for work and they will not accept a TB blood test.  I scheduled 2 nurses appointments for a Mantoux placement and reading on 3/20/18 and 3/22/18.  Melissa Núñez,

## 2018-03-20 ENCOUNTER — ALLIED HEALTH/NURSE VISIT (OUTPATIENT)
Dept: NURSING | Facility: CLINIC | Age: 63
End: 2018-03-20
Payer: COMMERCIAL

## 2018-03-20 DIAGNOSIS — Z11.1 SCREENING EXAMINATION FOR PULMONARY TUBERCULOSIS: Primary | ICD-10-CM

## 2018-03-20 DIAGNOSIS — Z12.11 SPECIAL SCREENING FOR MALIGNANT NEOPLASMS, COLON: ICD-10-CM

## 2018-03-20 LAB — HEMOCCULT STL QL IA: POSITIVE

## 2018-03-20 PROCEDURE — 99207 ZZC NO CHARGE NURSE ONLY: CPT

## 2018-03-20 PROCEDURE — 86580 TB INTRADERMAL TEST: CPT

## 2018-03-20 NOTE — LETTER
March 23, 2018    Melissa Coronado  [address]  [place] [address]            Dear Melissa,     You do not have evidence of Tuberculosis on your Mantoux test.      Please use [employer] or call our clinic at [phone] if you have any questions.       Marina Crandall MD/amada    Results for orders placed or performed in visit on 03/20/18   TB INTRADERMAL TEST   Result Value Ref Range    PPD Induration 2 0 - 5 mm    PPD Redness 2 mm    Impression    Mantoux results: No swelling.  No redness.  2 mm induration

## 2018-03-20 NOTE — MR AVS SNAPSHOT
After Visit Summary   3/20/2018    Melissa Coronado    MRN: [id number]           Patient Information     Date Of Birth          1955        Visit Information        Provider Department      3/20/2018 2:45 PM AN ANCILLARY [facility]        Today's Diagnoses     Screening examination for pulmonary tuberculosis    -  1       Follow-ups after your visit        Your next 10 appointments already scheduled     Mar 22, 2018  4:00 PM CDT   Nurse Only with An Rn   [facility] ([facility]    [address] [place] [facility] [address] [phone]            Jul 20, 2018  8:50 AM CDT   Office Visit with Marina Crandall MD   [facility] ([facility])    [address] [facility] [address] [phone]           Bring a current list of meds and any records pertaining to this visit. For Physicals, please bring immunization records and any forms needing to be filled out. Please arrive 10 minutes early to complete paperwork.              Who to contact     If you have questions or need follow up information about today's clinic visit or your schedule please contact [facility] directly at [phone].  Normal or non-critical lab and imaging results will be communicated to you by MyChart, letter or phone within 4 business days after the clinic has received the results. If you do not hear from us within 7 days, please contact the clinic through MyChart or phone. If you have a critical or abnormal lab result, we will notify you by phone as soon as possible.  Submit refill requests through [employer] or call your pharmacy and they will forward the refill request to us. Please allow 3 business days for your refill to be completed.          Additional Information About Your Visit        [employer] Information     [employer] lets you send messages to your doctor, view your test results, renew your prescriptions, schedule  "appointments and more. To sign up, go to [email or web address] . Click on \"Log in\" on the left side of the screen, which will take you to the Welcome page. Then click on \"Sign up Now\" on the right side of the page.     You will be asked to enter the access code listed below, as well as some personal information. Please follow the directions to create your username and password.     Your access code is: [email or web address]  Expires: 3/22/2018 11:44 AM     Your access code will  in 90 days. If you need help or a new code, please call your [facility] clinic or [phone].        Care EveryWhere ID     This is your Care EveryWhere ID. This could be used by other organizations to access your [facility] medical records  [id number]         Blood Pressure from Last 3 Encounters:   18 148/80   18 148/80   17 149/80    Weight from Last 3 Encounters:   18 212 lb (96.2 kg)   18 209 lb (94.8 kg)   17 207 lb (93.9 kg)              We Performed the Following     TB INTRADERMAL TEST        Primary Care Provider Office Phone # Fax #    Marina Crandall [phone] [address] [facility] [address]        Equal Access to Services     [facility] : Hadii deborah matamoros hadasho Sohu, waaxda luqadaha, qaybta kaalmada adebrandtyada, leonila barnes . So [facility] [phone].    ATENCIÓN: Si habla español, tiene a sprague disposición servicios gratuitos de asistencia lingüística. Llame al [phone].    We comply with applicable federal civil rights laws and [place] laws. We do not discriminate on the basis of race, color, national origin, age, disability, sex, sexual orientation, or gender identity.            Thank you!     Thank you for choosing [facility]  for your care. Our goal is always to provide you with excellent care. Hearing back from our patients is one way we can continue to improve our services. Please take a few minutes " to complete the written survey that you may receive in the mail after your visit with us. Thank you!             Your Updated Medication List - Protect others around you: Learn how to safely use, store and throw away your medicines at www.disposemymeds.org.          This list is accurate as of 3/20/18 11:59 PM.  Always use your most recent med list.                   Brand Name Dispense Instructions for use Diagnosis    ALEVE 220 MG tablet   Generic drug:  naproxen sodium      Take by mouth 2 times daily (with meals)        diltiazem 180 MG 24 hr capsule     90 capsule    Take 1 capsule (180 mg) by mouth daily    Hypertension goal BP (blood pressure) < 140/90       metFORMIN 500 MG tablet    GLUCOPHAGE    90 tablet    Take 1 tablet (500 mg) by mouth daily (with breakfast)    Prediabetes       metoprolol succinate 25 MG 24 hr tablet    TOPROL-XL    45 tablet    Take 0.5 tablets (12.5 mg) by mouth daily    Benign essential hypertension, Episodic tension-type headache, not intractable       * TYLENOL EXTRA STRENGTH PO      Take 500 mg by mouth Reported on 4/13/2017        * acetaminophen 325 MG tablet    TYLENOL    100 tablet    Take 2 tablets (650 mg) by mouth every 6 hours as needed for mild pain    Back strain, subsequent encounter       * Notice:  This list has 2 medication(s) that are the same as other medications prescribed for you. Read the directions carefully, and ask your doctor or other care provider to review them with you.

## 2018-03-21 NOTE — PROGRESS NOTES
The patient is asked the following questions today and these are her answers:    -Have you had a mantoux administered in the past 30 days?    No  -Have you had a previous positive Mantoux.  No  -Have you received BCG in the past.  No  -Have you had a live vaccine  (MMR, Varicella, OPV, Yellow Fever) in the last 6 weeks.  No  -Have you had and active  viral or bacterial infection in the past 6 weeks.  No  -Have you received corticosteroids or immunosuppressive agents in the past 6 weeks.  No  -Have you been diagnosed with HIV?  No  -Do you have a maglinancy?  No       Farzaneh Marquez MA

## 2018-03-22 ENCOUNTER — ALLIED HEALTH/NURSE VISIT (OUTPATIENT)
Dept: NURSING | Facility: CLINIC | Age: 63
End: 2018-03-22
Payer: COMMERCIAL

## 2018-03-22 ENCOUNTER — TELEPHONE (OUTPATIENT)
Dept: INTERNAL MEDICINE | Facility: CLINIC | Age: 63
End: 2018-03-22

## 2018-03-22 DIAGNOSIS — Z11.1 SCREENING EXAMINATION FOR PULMONARY TUBERCULOSIS: Primary | ICD-10-CM

## 2018-03-22 DIAGNOSIS — R19.5 POSITIVE FIT (FECAL IMMUNOCHEMICAL TEST): Primary | ICD-10-CM

## 2018-03-22 LAB
PPDINDURATION: 2 MM (ref 0–5)
PPDREDNESS: 2 MM

## 2018-03-22 PROCEDURE — 99207 ZZC NO CHARGE NURSE ONLY: CPT

## 2018-03-22 PROCEDURE — 36415 COLL VENOUS BLD VENIPUNCTURE: CPT | Performed by: INTERNAL MEDICINE

## 2018-03-22 PROCEDURE — 86480 TB TEST CELL IMMUN MEASURE: CPT | Performed by: INTERNAL MEDICINE

## 2018-03-22 NOTE — PROGRESS NOTES
Patient here for mantoux test reading. Patient notified RN that she was to get the TB quantiferon and not the skin test. Patient called her employer while in exam room and confirmed that patient TB Quantiferon was needed. Verbal orders received from Dr. Crandall, lab orders entered and patient brought to lab.     Safia Seth RN

## 2018-03-22 NOTE — MR AVS SNAPSHOT
"              After Visit Summary   3/22/2018    Melissa Coronado    MRN: [id number]           Patient Information     Date Of Birth          1955        Visit Information        Provider Department      3/22/2018 4:00 PM Rn, An [facility]        Today's Diagnoses     Screening examination for pulmonary tuberculosis    -  1       Follow-ups after your visit        Your next 10 appointments already scheduled     Jul 20, 2018  8:50 AM CDT   Office Visit with Marina Crandall MD   [facility]    [address] [address] [phone]           Bring a current list of meds and any records pertaining to this visit. For Physicals, please bring immunization records and any forms needing to be filled out. Please arrive 10 minutes early to complete paperwork.              Who to contact     If you have questions or need follow up information about today's clinic visit or your schedule please contact [facility] directly at [phone].  Normal or non-critical lab and imaging results will be communicated to you by Yolahart, letter or phone within 4 business days after the clinic has received the results. If you do not hear from us within 7 days, please contact the clinic through [employer] or phone. If you have a critical or abnormal lab result, we will notify you by phone as soon as possible.  Submit refill requests through [employer]-crowd or call your pharmacy and they will forward the refill request to us. Please allow 3 business days for your refill to be completed.          Additional Information About Your Visit        [email or web address] Information     [employer] lets you send messages to your doctor, view your test results, renew your prescriptions, schedule appointments and more. To sign up, go to [email or web address] . Click on \"Log in\" on the left side of the screen, which will take you to the Welcome page. Then click on \"Sign up Now\" on " the right side of the page.     You will be asked to enter the access code listed below, as well as some personal information. Please follow the directions to create your username and password.     Your access code is: [email or web address]  Expires: 2018  4:56 PM     Your access code will  in 90 days. If you need help or a new code, please call your [facility] clinic or [phone].        Care EveryWhere ID     This is your Care EveryWhere ID. This could be used by other organizations to access your [facility] medical records  [id number]         Blood Pressure from Last 3 Encounters:   18 148/80   18 148/80   17 149/80    Weight from Last 3 Encounters:   18 212 lb (96.2 kg)   18 209 lb (94.8 kg)   17 207 lb (93.9 kg)              We Performed the Following     M Tuberculosis by Quantiferon        Primary Care Provider Office Phone # Fax #    Marina Crandall [phone] [address] [facility] [address]        Equal Access to Services     [facility]: Hadii aad ku hadasho Soomaali, waaxda luqadaha, qaybta kaalmada adeegyada, leonila bejarano haylinnetten lino barnes . So [facility] [phone].    ATENCIÓN: Si habla español, tiene a sprague disposición servicios gratuitos de asistencia lingüística. Davidame al [phone].    We comply with applicable federal civil rights laws and [place] laws. We do not discriminate on the basis of race, color, national origin, age, disability, sex, sexual orientation, or gender identity.            Thank you!     Thank you for choosing [facility]  for your care. Our goal is always to provide you with excellent care. Hearing back from our patients is one way we can continue to improve our services. Please take a few minutes to complete the written survey that you may receive in the mail after your visit with us. Thank you!             Your Updated Medication List - Protect others around you: Learn how  to safely use, store and throw away your medicines at www.disposemymeds.org.          This list is accurate as of 3/22/18  4:56 PM.  Always use your most recent med list.                   Brand Name Dispense Instructions for use Diagnosis    ALEVE 220 MG tablet   Generic drug:  naproxen sodium      Take by mouth 2 times daily (with meals)        diltiazem 180 MG 24 hr capsule     90 capsule    Take 1 capsule (180 mg) by mouth daily    Hypertension goal BP (blood pressure) < 140/90       metFORMIN 500 MG tablet    GLUCOPHAGE    90 tablet    Take 1 tablet (500 mg) by mouth daily (with breakfast)    Prediabetes       metoprolol succinate 25 MG 24 hr tablet    TOPROL-XL    45 tablet    Take 0.5 tablets (12.5 mg) by mouth daily    Benign essential hypertension, Episodic tension-type headache, not intractable       * TYLENOL EXTRA STRENGTH PO      Take 500 mg by mouth Reported on 4/13/2017        * acetaminophen 325 MG tablet    TYLENOL    100 tablet    Take 2 tablets (650 mg) by mouth every 6 hours as needed for mild pain    Back strain, subsequent encounter       * Notice:  This list has 2 medication(s) that are the same as other medications prescribed for you. Read the directions carefully, and ask your doctor or other care provider to review them with you.

## 2018-03-23 NOTE — PROGRESS NOTES
Please send letter informing the patient and attach results:    Dear Melissa,     Your stool test shows some blood. We recommend a colonoscopy in such cases. Please call [phone] to schedule the colonoscopy.    Please use [employer] or call our clinic at [phone] if you have any questions.       Marina Crandall MD

## 2018-03-23 NOTE — PROGRESS NOTES
Please send letter informing the patient and attach results:    Dear Melissa,     You do not have evidence of Tuberculosis on your Mantoux test.      Please use [employer] or call our clinic at [phone] if you have any questions.       Marina Crandall MD

## 2018-03-25 LAB
M TB TUBERC IFN-G BLD QL: ABNORMAL
M TB TUBERC IFN-G/MITOGEN IGNF BLD: 0.39 IU/ML

## 2018-03-27 ENCOUNTER — TELEPHONE (OUTPATIENT)
Dept: INTERNAL MEDICINE | Facility: CLINIC | Age: 63
End: 2018-03-27

## 2018-03-27 DIAGNOSIS — R76.12 POSITIVE QUANTIFERON-TB GOLD TEST: Primary | ICD-10-CM

## 2018-03-27 NOTE — TELEPHONE ENCOUNTER
"Please call the patient and let her know that her Quantiferon TB test is \"low positive.\" Since many times such a result is falsely positive, please help her schedule a lap appointment for a repeat Quantiferon test in a month.  Thank you,  Marina Crandall MD   "

## 2018-03-27 NOTE — TELEPHONE ENCOUNTER
Called patient, informed pt of providers note as written below, pt verbalized good understanding.   We made her the lab only appointment and patient wrote down the appointment details.   RENÉ AshN RN

## 2018-04-18 DIAGNOSIS — R76.12 POSITIVE QUANTIFERON-TB GOLD TEST: ICD-10-CM

## 2018-04-18 PROCEDURE — 86480 TB TEST CELL IMMUN MEASURE: CPT | Performed by: INTERNAL MEDICINE

## 2018-04-18 PROCEDURE — 36415 COLL VENOUS BLD VENIPUNCTURE: CPT | Performed by: INTERNAL MEDICINE

## 2018-04-19 LAB
M TB TUBERC IFN-G BLD QL: POSITIVE
M TB TUBERC IFN-G/MITOGEN IGNF BLD: 1.97 IU/ML

## 2018-04-22 ENCOUNTER — TELEPHONE (OUTPATIENT)
Dept: INTERNAL MEDICINE | Facility: CLINIC | Age: 63
End: 2018-04-22

## 2018-04-22 NOTE — TELEPHONE ENCOUNTER
Chart reviewed.  Patient, who was born in [place], had come in for Mantoux testing required for her work-the PPD placed on 3.20.18 was negative for TB; however, the patient had clarified with her employer on the day of the Mantoux reading (3/22.18) that they actually require the TB blood test. A Quantiferon was thus drawn the same day, and came back as borderline positive. Per guidelines and to reduce the chance of a false positive, the Quantiferon was repeated a month later and is actually more positive than before.  In light of the above, please advise the patient to make a clinic appointment within the next 1-2 weeks with me where I can get a complete history of any suspect signs or symptoms of TB, get a CHEST X-RAY, and may likely start treatment for LTBI.     Thank you,  Marina Crandall MD

## 2018-04-23 NOTE — TELEPHONE ENCOUNTER
Called patient, reviewed the below with her, pt verbalized good understanding.  We made her an appointment for 4/27/18.  GABRIELLE Ash RN

## 2018-04-30 ENCOUNTER — TELEPHONE (OUTPATIENT)
Dept: INTERNAL MEDICINE | Facility: CLINIC | Age: 63
End: 2018-04-30

## 2018-04-30 ENCOUNTER — RADIANT APPOINTMENT (OUTPATIENT)
Dept: GENERAL RADIOLOGY | Facility: CLINIC | Age: 63
End: 2018-04-30
Attending: INTERNAL MEDICINE
Payer: COMMERCIAL

## 2018-04-30 ENCOUNTER — OFFICE VISIT (OUTPATIENT)
Dept: INTERNAL MEDICINE | Facility: CLINIC | Age: 63
End: 2018-04-30
Payer: COMMERCIAL

## 2018-04-30 VITALS
SYSTOLIC BLOOD PRESSURE: 143 MMHG | HEIGHT: 65 IN | TEMPERATURE: 98.2 F | WEIGHT: 209 LBS | BODY MASS INDEX: 34.82 KG/M2 | DIASTOLIC BLOOD PRESSURE: 65 MMHG | HEART RATE: 57 BPM | OXYGEN SATURATION: 96 %

## 2018-04-30 DIAGNOSIS — I10 HYPERTENSION GOAL BP (BLOOD PRESSURE) < 140/90: Primary | ICD-10-CM

## 2018-04-30 DIAGNOSIS — R76.12 POSITIVE QUANTIFERON-TB GOLD TEST: ICD-10-CM

## 2018-04-30 DIAGNOSIS — Z22.7 LATENT TUBERCULOSIS BY BLOOD TEST: Primary | ICD-10-CM

## 2018-04-30 DIAGNOSIS — Z11.4 SCREENING FOR HUMAN IMMUNODEFICIENCY VIRUS: ICD-10-CM

## 2018-04-30 DIAGNOSIS — E66.01 MORBID OBESITY (H): ICD-10-CM

## 2018-04-30 DIAGNOSIS — Z79.899 ON ISONIAZID THERAPY: ICD-10-CM

## 2018-04-30 LAB
ALBUMIN SERPL-MCNC: 3.6 G/DL (ref 3.4–5)
ALP SERPL-CCNC: 63 U/L (ref 40–150)
ALT SERPL W P-5'-P-CCNC: 18 U/L (ref 0–50)
AST SERPL W P-5'-P-CCNC: 17 U/L (ref 0–45)
BILIRUB DIRECT SERPL-MCNC: 0.1 MG/DL (ref 0–0.2)
BILIRUB SERPL-MCNC: 0.5 MG/DL (ref 0.2–1.3)
LDLC SERPL DIRECT ASSAY-MCNC: 120 MG/DL
PROT SERPL-MCNC: 7.4 G/DL (ref 6.8–8.8)

## 2018-04-30 PROCEDURE — 83721 ASSAY OF BLOOD LIPOPROTEIN: CPT | Performed by: INTERNAL MEDICINE

## 2018-04-30 PROCEDURE — 36415 COLL VENOUS BLD VENIPUNCTURE: CPT | Performed by: INTERNAL MEDICINE

## 2018-04-30 PROCEDURE — 71046 X-RAY EXAM CHEST 2 VIEWS: CPT | Mod: FY

## 2018-04-30 PROCEDURE — 80076 HEPATIC FUNCTION PANEL: CPT | Performed by: INTERNAL MEDICINE

## 2018-04-30 PROCEDURE — 99214 OFFICE O/P EST MOD 30 MIN: CPT | Performed by: INTERNAL MEDICINE

## 2018-04-30 NOTE — PROGRESS NOTES
SUBJECTIVE:   Melissa Coronado is a 63 year old female who presents to clinic today for the following health issues:      Patient is here for a chest xray. Positive TB test:    No cough.  No unintended wt loss.  No unexplained fevers.  No night sweats.  Patient has not had known exposure to TB.  Patient is from [facility]. Per the patient, they do the BCG in [facility].   Patient is currently working at a group home. She was advised by her employer to not come back to work for about 10 days now.         Reviewed and updated as needed this visit by clinical staff  Tobacco  Allergies  Meds  Problems  Med Hx  Surg Hx  Fam Hx  Soc Hx           MARBELLA.PHILLY Kaur  Reviewed and updated as needed this visit by Provider  Meds  Problems           Patient Active Problem List   Diagnosis     Hypertension goal BP (blood pressure) < 140/90     Benign essential hypertension     Morbid obesity (H)     Latent tuberculosis by blood test       History reviewed. No pertinent past medical history.    History reviewed. No pertinent surgical history.    Family History   Problem Relation Age of Onset     Hernia Brother      Other - See Comments Son      suffers from sneezing disorder       Social History   Substance Use Topics     Smoking status: Never Smoker     Smokeless tobacco: Never Used     Alcohol use Yes      Comment: small amt       Current Outpatient Prescriptions   Medication     Acetaminophen (TYLENOL EXTRA STRENGTH PO)     acetaminophen (TYLENOL) 325 MG tablet     diltiazem 180 MG 24 hr capsule     metFORMIN (GLUCOPHAGE) 500 MG tablet     metoprolol succinate (TOPROL-XL) 25 MG 24 hr tablet     naproxen sodium (ALEVE) 220 MG tablet     isoniazid (NYDRAZID) 300 MG tablet     pyridOXINE (VITAMIN B-6) 50 MG tablet     No current facility-administered medications for this visit.          ROS:  Constitutional, HEENT, cardiovascular, pulmonary, GI, , musculoskeletal, neuro, skin, endocrine and psych systems are negative, except as  "otherwise noted.     OBJECTIVE:                                                    /65  Pulse 57  Temp 98.2  F (36.8  C) (Oral)  Ht 5' 5\" (1.651 m)  Wt 209 lb (94.8 kg)  SpO2 96%  BMI 34.78 kg/m2     GENERAL APPEARANCE: healthy, alert and in no distress  EYES: Eyes grossly normal to inspection, and conjunctivae and sclerae normal  HENT: head normocephalic and atraumatic and mouth without ulcers or lesions, oropharynx clear and oral mucous membranes moist  NECK: no noticeable adenopathy, no asymmetry, masses, or scars   RESP: lungs clear to auscultation - no rales, rhonchi or wheezes  CV: regular rate and rhythm, normal S1 S2, no S3 or S4, no murmur, click or rub, no peripheral edema and peripheral pulses strong  ABDOMEN: soft, nontender, no hepatosplenomegaly, no masses and bowel sounds normal  MS: no musculoskeletal defects are noted and gait is age appropriate without ataxia  SKIN: no suspicious lesions or rashes  NEURO: mentation intact and speech normal  PSYCH: mentation appears normal and affect normal/bright.    Results for orders placed or performed in visit on 04/30/18   LDL cholesterol direct   Result Value Ref Range    LDL Cholesterol Direct 120 (H) <100 mg/dL   Hepatic panel (Albumin, ALT, AST, Bili, Alk Phos, TP)   Result Value Ref Range    Bilirubin Direct 0.1 0.0 - 0.2 mg/dL    Bilirubin Total 0.5 0.2 - 1.3 mg/dL    Albumin 3.6 3.4 - 5.0 g/dL    Protein Total 7.4 6.8 - 8.8 g/dL    Alkaline Phosphatase 63 40 - 150 U/L    ALT 18 0 - 50 U/L    AST 17 0 - 45 U/L       Recent Results (from the past 744 hour(s))   XR Chest 2 Views    Narrative    CHEST TWO VIEWS  4/30/2018 11:25 AM     HISTORY:  Positive QuantiFERON-TB Gold test.    COMPARISON: None.     FINDINGS:  There are no acute infiltrates. The cardiac silhouette is  not enlarged. Pulmonary vasculature is unremarkable.       Impression    IMPRESSION: No acute disease.    TASH ROJAS MD         ASSESSMENT/PLAN:                              "                           ICD-10-CM    1. Hypertension goal BP (blood pressure) < 140/90 I10 LDL cholesterol direct   2. Morbid obesity (H) E66.01    3. Positive QuantiFERON-TB Gold test R76.12 XR Chest 2 Views     Hepatic panel (Albumin, ALT, AST, Bili, Alk Phos, TP)   4. Screening for human immunodeficiency virus Z11.4 HIV Antigen Antibody Combo     + TB test:  plan: if CHEST X-RAY is neg, will likely start 9 month INH plus B6 w/ q3 months LFTs for LTBI. If CHEST X-RAY is positive, will start multiabx, and refer to ID, with appropriate precautions.    Hypertension: ASSESSMENT: stable  PLAN: continue current regimen     Morbid obesity: patient was encouraged to strive towards a healthy diet and regular exercise today.      Patient Instructions   We will let you know your test results as discussed: by phone for urgent results, otherwise by postal mail and will provide further instructions based on the results.          Marina Crandall MD    [facility]  [address] [facility] [address] [phone]

## 2018-04-30 NOTE — MR AVS SNAPSHOT
After Visit Summary   4/30/2018    Melissa Coronado    MRN: [id number]           Patient Information     Date Of Birth          1955        Visit Information        Provider Department      4/30/2018 9:50 AM Marina Crandall MD [facility]        Today's Diagnoses     Hypertension goal BP (blood pressure) < 140/90    -  1    Morbid obesity (H)        Positive QuantiFERON-TB Gold test        Screening for human immunodeficiency virus          Care Instructions    We will let you know your test results as discussed: by phone for urgent results, otherwise by postal mail and will provide further instructions based on the results.              Follow-ups after your visit        Your next 10 appointments already scheduled     Jul 20, 2018  8:50 AM CDT   Office Visit with Marina Crandall MD   [facility] ([facility]    [address] [facility] [address] [phone]           Bring a current list of meds and any records pertaining to this visit. For Physicals, please bring immunization records and any forms needing to be filled out. Please arrive 10 minutes early to complete paperwork.              Future tests that were ordered for you today     Open Future Orders        Priority Expected Expires Ordered    HIV Antigen Antibody Combo Routine 4/30/2018 4/30/2019 4/30/2018    XR Chest 2 Views Routine 4/30/2018 4/30/2019 4/30/2018            Who to contact     If you have questions or need follow up information about today's clinic visit or your schedule please contact [facility] directly at [phone].  Normal or non-critical lab and imaging results will be communicated to you by MyChart, letter or phone within 4 business days after the clinic has received the results. If you do not hear from us within 7 days, please contact the clinic through MyChart or phone. If you have a critical or abnormal lab result, we will  "notify you by phone as soon as possible.  Submit refill requests through [employer] or call your pharmacy and they will forward the refill request to us. Please allow 3 business days for your refill to be completed.          Additional Information About Your Visit        [employer] Information     [employer] lets you send messages to your doctor, view your test results, renew your prescriptions, schedule appointments and more. To sign up, go to [email or web address] . Click on \"Log in\" on the left side of the screen, which will take you to the Welcome page. Then click on \"Sign up Now\" on the right side of the page.     You will be asked to enter the access code listed below, as well as some personal information. Please follow the directions to create your username and password.     Your access code is: [email or web address]  Expires: 2018  4:56 PM     Your access code will  in 90 days. If you need help or a new code, please call your [facility] clinic or [phone].        Care EveryWhere ID     This is your Care EveryWhere ID. This could be used by other organizations to access your [facility] medical records  [id number]        Your Vitals Were     Pulse Temperature Height Pulse Oximetry BMI (Body Mass Index)       57 98.2  F (36.8  C) (Oral) 5' 5\" (1.651 m) 96% 34.78 kg/m2        Blood Pressure from Last 3 Encounters:   18 143/65   18 148/80   18 148/80    Weight from Last 3 Encounters:   18 209 lb (94.8 kg)   18 212 lb (96.2 kg)   18 209 lb (94.8 kg)              We Performed the Following     Hepatic panel (Albumin, ALT, AST, Bili, Alk Phos, TP)     LDL cholesterol direct        Primary Care Provider Office Phone # Fax #    Marina Crandall [phone] [address] [facility] [address]        Equal Access to Services     JOHN MAJOR : Juan Francisco Castañeda, thor wright, qaleonila melgar" laandrade murray. So [facility] [phone].    ATENCIÓN: Si habla lupe, tiene a sprague disposición servicios gratuitos de asistencia lingüística. Veda leary [phone].    We comply with applicable federal civil rights laws and [place] laws. We do not discriminate on the basis of race, color, national origin, age, disability, sex, sexual orientation, or gender identity.            Thank you!     Thank you for choosing [facility]  for your care. Our goal is always to provide you with excellent care. Hearing back from our patients is one way we can continue to improve our services. Please take a few minutes to complete the written survey that you may receive in the mail after your visit with us. Thank you!             Your Updated Medication List - Protect others around you: Learn how to safely use, store and throw away your medicines at www.disposemymeds.org.          This list is accurate as of 4/30/18 11:04 AM.  Always use your most recent med list.                   Brand Name Dispense Instructions for use Diagnosis    ALEVE 220 MG tablet   Generic drug:  naproxen sodium      Take by mouth 2 times daily (with meals)        diltiazem 180 MG 24 hr capsule     90 capsule    Take 1 capsule (180 mg) by mouth daily    Hypertension goal BP (blood pressure) < 140/90       metFORMIN 500 MG tablet    GLUCOPHAGE    90 tablet    Take 1 tablet (500 mg) by mouth daily (with breakfast)    Prediabetes       metoprolol succinate 25 MG 24 hr tablet    TOPROL-XL    45 tablet    Take 0.5 tablets (12.5 mg) by mouth daily    Benign essential hypertension, Episodic tension-type headache, not intractable       * TYLENOL EXTRA STRENGTH PO      Take 500 mg by mouth Reported on 4/13/2017        * acetaminophen 325 MG tablet    TYLENOL    100 tablet    Take 2 tablets (650 mg) by mouth every 6 hours as needed for mild pain    Back strain, subsequent encounter       * Notice:  This list has 2 medication(s) that are the same as other  medications prescribed for you. Read the directions carefully, and ask your doctor or other care provider to review them with you.

## 2018-04-30 NOTE — PATIENT INSTRUCTIONS
We will let you know your test results as discussed: by phone for urgent results, otherwise by postal mail and will provide further instructions based on the results.

## 2018-04-30 NOTE — LETTER
"    May 2, 2018    Melissa Coronado  [address]  [place] [address]            Dear Melissa,     Your chest X-ray did not show evidence of active Tuberculosis, which is good news.   Due to your positive blood (Quantiferon) test, I do advise you to be treated for inactive or \"latent\" Tuberculosis.  This will require that you take one antibiotic and a vitamin for 9 months and that you have non-fasting labwork every 3 months to check your liver function.      1) please  the prescription for the Isoniazid and vitamin B6 from your pharmacy. There will be enough refills of each to last you 9 months. Avoid Tylenol (acetaminophen) use while you are on this antibiotic. You may use ibuprofen (aka Advil, Motrin) and Aleve (aka naproxen) instead for your aches and fevers.     2) please schedule your next non-fasting lab test in 3 months, ie, in 8/2018. (you should then have 2 more lab tests, one in 11/2018 and the final one at the end, in 2/2019).      Please call our clinic at [phone] if you have any questions.     Marina Crandall MD/amada      Results for orders placed or performed in visit on 04/30/18   XR Chest 2 Views    Narrative    CHEST TWO VIEWS  4/30/2018 11:25 AM     HISTORY:  Positive QuantiFERON-TB Gold test.    COMPARISON: None.     FINDINGS:  There are no acute infiltrates. The cardiac silhouette is  not enlarged. Pulmonary vasculature is unremarkable.       Impression    IMPRESSION: No acute disease.    TASH ROJAS MD     "

## 2018-04-30 NOTE — LETTER
May 7, 2018    Melissajohn Coronado  [address]  [place] [address]          Dear Melissa,     Your CHEST X-RAY does not show active lung Tuberculosis.  You have already been advised to begin treatment for latent Tuberculosis.  Please continue to follow the treatment plan.       Please use [employer] or call our clinic at [phone] if you have any questions.       Marina Crandall MD/amada    Results for orders placed or performed in visit on 04/30/18   XR Chest 2 Views    Narrative    CHEST TWO VIEWS  4/30/2018 11:25 AM     HISTORY:  Positive QuantiFERON-TB Gold test.    COMPARISON: None.     FINDINGS:  There are no acute infiltrates. The cardiac silhouette is  not enlarged. Pulmonary vasculature is unremarkable.       Impression    IMPRESSION: No acute disease.    TASH ROJAS MD

## 2018-04-30 NOTE — TELEPHONE ENCOUNTER
Patient calling to discuss the results of her TB test and xray. She is wondering if she should start treatment.

## 2018-04-30 NOTE — LETTER
"    May 7, 2018    Melissa Coronado  [address]  [place] [address]            Dear Melissa,     Your liver function tests are within normal limits (this was checked because we are starting you on Isoniazid).  Your LDL cholesterol (otherwise known as the \"bad cholesterol\") level is within normal limits for you.    Please continue the treatment plan that we discussed at your last clinic visit and let me know if you have any questions via [employer] or by calling [phone].        Marina Crandall MD/aamda      Results for orders placed or performed in visit on 04/30/18   LDL cholesterol direct   Result Value Ref Range    LDL Cholesterol Direct 120 (H) <100 mg/dL   Hepatic panel (Albumin, ALT, AST, Bili, Alk Phos, TP)   Result Value Ref Range    Bilirubin Direct 0.1 0.0 - 0.2 mg/dL    Bilirubin Total 0.5 0.2 - 1.3 mg/dL    Albumin 3.6 3.4 - 5.0 g/dL    Protein Total 7.4 6.8 - 8.8 g/dL    Alkaline Phosphatase 63 40 - 150 U/L    ALT 18 0 - 50 U/L    AST 17 0 - 45 U/L                       "

## 2018-05-01 PROBLEM — Z22.7 LATENT TUBERCULOSIS BY BLOOD TEST: Status: ACTIVE | Noted: 2018-05-01

## 2018-05-01 RX ORDER — ISONIAZID 300 MG/1
300 TABLET ORAL DAILY
Qty: 90 TABLET | Refills: 2 | Status: SHIPPED | OUTPATIENT
Start: 2018-05-01 | End: 2020-02-21

## 2018-05-01 RX ORDER — PYRIDOXINE HCL (VITAMIN B6) 50 MG
50 TABLET ORAL DAILY
Qty: 90 TABLET | Refills: 2 | Status: SHIPPED | OUTPATIENT
Start: 2018-05-01 | End: 2021-03-10

## 2018-05-01 NOTE — TELEPHONE ENCOUNTER
"The LFTs have processed so I can finalize my recommendations now:    Please call and advise the patient as follows, and also send a letter with the same text and attach recent test results [statements which are in bold and in square brackets, like this sentence, is for clinic staff and should not be included in the text of the letter to the patient]:    Dear Melissa,    Your chest X-ray did not show evidence of active Tuberculosis, which is good news.   Due to your positive blood (Quantiferon) test, I do advise you to be treated for inactive or \"latent\" Tuberculosis.  This will require that you take one antibiotic and a vitamin for 9 months and that you have non-fasting labwork every 3 months to check your liver function.     1) please  the prescription for the Isoniazid and vitamin B6 from your pharmacy. There will be enough refills of each to last you 9 months. Avoid Tylenol (acetaminophen) use while you are on this antibiotic. You may use ibuprofen (aka Advil, Motrin) and Aleve (aka naproxen) instead for your aches and fevers.    2) please schedule your next non-fasting lab test in 3 months, ie, in 8/2018. (you should then have 2 more lab tests, one in 11/2018 and the final one at the end, in 2/2019).     Please call our clinic at [phone] if you have any questions.    Marina Crandall MD     "

## 2018-05-01 NOTE — TELEPHONE ENCOUNTER
Left message for patient to call back on their VM. Writers direct line given, Naren SHRESTHA [phone]. Nicole Bourgeois RN

## 2018-05-02 NOTE — TELEPHONE ENCOUNTER
Patient informed of result note as below  Patient verbalized understanding       - please mail result letter to patient as noted below  Mariam MERRITTN, RN, CPN

## 2018-05-04 ENCOUNTER — TELEPHONE (OUTPATIENT)
Dept: INTERNAL MEDICINE | Facility: CLINIC | Age: 63
End: 2018-05-04

## 2018-05-04 NOTE — TELEPHONE ENCOUNTER
Patient is requesting the results of her TB shots be sent to home address which has been verified   Patient is also requesting the be fax to her @ [phone]  Thank you

## 2018-05-04 NOTE — LETTER
May 4, 2018    Melissa Coronado  [address]  [place] [address]        Order   TB INTRADERMAL TEST [76790 (CPT )] (Order [id number])   Component Results   Component Value Flag Ref Range Units Status Resulted Lab   PPD Induration 2  0 - 5 mm Final 03/22/2018  4:29 PM Unknown   PPD Redness 2   mm Final 03/22/2018  4:29 PM Unknown   Impression   Mantoux results: No swelling.  No redness.  2 mm induration

## 2018-05-04 NOTE — PROGRESS NOTES
Please send letter informing the patient and attach results:    Dear Melissa,     Your CHEST X-RAY does not show active lung Tuberculosis.  You have already been advised to begin treatment for latent Tuberculosis.  Please continue to follow the treatment plan.       Please use [employer] or call our clinic at [phone] if you have any questions.       Marina Crandall MD

## 2018-05-04 NOTE — PROGRESS NOTES
"Please send letter informing the patient and attach results:    Dear Melissa,     Your liver function tests are within normal limits (this was checked because we are starting you on Isoniazid).  Your LDL cholesterol (otherwise known as the \"bad cholesterol\") level is within normal limits for you.    Please continue the treatment plan that we discussed at your last clinic visit and let me know if you have any questions via [employer] or by calling [phone].        Marina Crandall MD"

## 2018-08-02 NOTE — PROGRESS NOTES
SUBJECTIVE:   Melissa Coronado is a 63 year old female who presents to clinic today for the following health issues:      Hypertension Follow-up      Outpatient blood pressures are not being checked.    Low Salt Diet: low salt      Amount of exercise or physical activity: walk    Problems taking medications regularly: No    Medication side effects: none    Diet: regular (no restrictions)     Back Pain       Duration: x 1 year        Specific cause: none    Description:   Location of pain: low back bilateral and neck left  Character of pain: burning  Pain radiation:radiates into the left buttocks and radiates into the left leg, but not beyond the L knee  New numbness or weakness in legs, not attributed to pain:  YES    Intensity: Currently  moderate    History:   Pain interferes with job: YES, interferes with home duties.   History of back problems: recurrent self limited episodes of low back pain in the past  Any previous MRI or X-rays: Yes--on neck not sure when  Sees a specialist for back pain:  In the hospital  Therapies tried without relief: acetaminophen (Tylenol)    Alleviating factors:   Improved by: none      Precipitating factors:  Worsened by: Lifting, Bending and Walking    Functional and Psychosocial Screen (Esau STarT Back):      acetaminophen 500mg bid and takes naproxen 220mg once daily.           Accompanying Signs & Symptoms:  Risk of Fracture:  None  Risk of Cauda Equina:  None  Risk of Infection:  None  Risk of Cancer:  None  Risk of Ankylosing Spondylitis:  Onset at age <35, male, AND morning back stiffness. no           Reviewed and updated as needed this visit by clinical staff  Tobacco  Meds  Problems  Med Hx  Surg Hx  Fam Hx  Soc Hx      Reviewed and updated as needed this visit by Provider  Meds  Problems         Patient Active Problem List   Diagnosis     Hypertension goal BP (blood pressure) < 140/90     Benign essential hypertension     Morbid obesity (H)     Latent tuberculosis by  blood test     Prediabetes       History reviewed. No pertinent past medical history.    History reviewed. No pertinent surgical history.    Family History   Problem Relation Age of Onset     Hernia Brother      Other - See Comments Son      suffers from sneezing disorder       Social History   Substance Use Topics     Smoking status: Never Smoker     Smokeless tobacco: Never Used     Alcohol use Yes      Comment: small amt       Current Outpatient Prescriptions   Medication     Acetaminophen (TYLENOL EXTRA STRENGTH PO)     diltiazem 180 MG 24 hr capsule     isoniazid (NYDRAZID) 300 MG tablet     metFORMIN (GLUCOPHAGE) 500 MG tablet     metoprolol succinate (TOPROL-XL) 25 MG 24 hr tablet     pyridOXINE (VITAMIN B-6) 50 MG tablet     No current facility-administered medications for this visit.          ROS:  Constitutional, HEENT, cardiovascular, pulmonary, GI, , musculoskeletal, neuro, skin, endocrine and psych systems are negative, except as otherwise noted.     OBJECTIVE:                                                    /80  Pulse 63  Temp 97.9  F (36.6  C) (Oral)  Resp 22  Wt 207 lb (93.9 kg)  SpO2 98%  BMI 34.45 kg/m2     GENERAL APPEARANCE: healthy, alert and in no distress  EYES: Eyes grossly normal to inspection, and conjunctivae and sclerae normal  HENT: head normocephalic and atraumatic and mouth without ulcers or lesions, oropharynx clear and oral mucous membranes moist  NECK: no noticeable adenopathy, no asymmetry, masses, or scars   RESP: lungs clear to auscultation - no rales, rhonchi or wheezes  CV: regular rate and rhythm, normal S1 S2, no S3 or S4, no murmur, click or rub, no peripheral edema and peripheral pulses strong  ABDOMEN: soft, nontender, no hepatosplenomegaly, no masses and bowel sounds normal  MS: no musculoskeletal defects are noted and gait is age appropriate without ataxia  SKIN: no suspicious lesions or rashes  NEURO: mentation intact and speech normal  PSYCH: mentation  appears normal and affect normal/bright.    Results for orders placed or performed in visit on 08/03/18   HIV Screening   Result Value Ref Range    HIV Antigen Antibody Combo Nonreactive NR^Nonreactive       Basic metabolic panel   Result Value Ref Range    Sodium 141 133 - 144 mmol/L    Potassium 3.8 3.4 - 5.3 mmol/L    Chloride 104 94 - 109 mmol/L    Carbon Dioxide 31 20 - 32 mmol/L    Anion Gap 6 3 - 14 mmol/L    Glucose 87 70 - 99 mg/dL    Urea Nitrogen 11 7 - 30 mg/dL    Creatinine 0.71 0.52 - 1.04 mg/dL    GFR Estimate 82 >60 mL/min/1.7m2    GFR Estimate If Black >90 >60 mL/min/1.7m2    Calcium 9.6 8.5 - 10.1 mg/dL   Hepatic panel (Albumin, ALT, AST, Bili, Alk Phos, TP)   Result Value Ref Range    Bilirubin Direct <0.1 0.0 - 0.2 mg/dL    Bilirubin Total 0.6 0.2 - 1.3 mg/dL    Albumin 3.7 3.4 - 5.0 g/dL    Protein Total 7.8 6.8 - 8.8 g/dL    Alkaline Phosphatase 63 40 - 150 U/L    ALT 23 0 - 50 U/L    AST 17 0 - 45 U/L       No results found for this or any previous visit (from the past 744 hour(s)).      ASSESSMENT/PLAN:                                                        ICD-10-CM    1. Hypertension goal BP (blood pressure) < 140/90 I10 Basic metabolic panel     diltiazem 180 MG 24 hr capsule   2. Latent tuberculosis by blood test R76.11 Hepatic panel (Albumin, ALT, AST, Bili, Alk Phos, TP)   3. Benign essential hypertension I10 metoprolol succinate (TOPROL-XL) 25 MG 24 hr tablet   4. Prediabetes R73.03 metFORMIN (GLUCOPHAGE) 500 MG tablet   5. Episodic tension-type headache, not intractable G44.219 metoprolol succinate (TOPROL-XL) 25 MG 24 hr tablet   6. Back strain, initial encounter S39.012A    7. On isoniazid therapy Z79.899 Hepatic panel (Albumin, ALT, AST, Bili, Alk Phos, TP)   8. Screening for human immunodeficiency virus Z11.4 HIV Screening     1: uncontrolled currently, may be d/t acute pain PLAN below  Back strain: lumbago, PLAN below  Other issues: ASSESSMENT: stable  PLAN: continue current  regimen        Patient Instructions   For your back pain:  Please take the Extra strength Tylenol or acetaminophen 500mg tablets as 1,000mg every 8 hours (whether you have pain or not at that time) for 1-4 weeks until the pain is gone.  Use cooling or heating packs-whatever makes your pain better.  Return to clinic if not better after 2 weeks of the above treatment.    If you develop new or worse numbness or weakness of your legs or an inability to hold in urine or feces, please call the clinic promptly to discuss this. If you develop an inability to get your urine out, please call please call the clinic promptly to discuss this. If you are calling after regular business hours and cannot reach a live person in our clinic, I would advise you to go to the Emergency Room for these symptoms.         Regarding you elevated blood pressure:  It is possible that your blood pressure is elevated now due to your back pain.  Please come back for a free blood pressure check at our clinic pharmacy in 2 weeks. If it is above 149/89 at that point, please schedule an appointment with me, because we will need to add a new medication.       Marina Crandall MD    [facility]  [address] [facility] [address] [phone]

## 2018-08-03 ENCOUNTER — OFFICE VISIT (OUTPATIENT)
Dept: INTERNAL MEDICINE | Facility: CLINIC | Age: 63
End: 2018-08-03
Payer: COMMERCIAL

## 2018-08-03 VITALS
OXYGEN SATURATION: 98 % | WEIGHT: 207 LBS | HEART RATE: 63 BPM | SYSTOLIC BLOOD PRESSURE: 160 MMHG | DIASTOLIC BLOOD PRESSURE: 80 MMHG | RESPIRATION RATE: 22 BRPM | BODY MASS INDEX: 34.45 KG/M2 | TEMPERATURE: 97.9 F

## 2018-08-03 DIAGNOSIS — S39.012A BACK STRAIN, INITIAL ENCOUNTER: ICD-10-CM

## 2018-08-03 DIAGNOSIS — G44.219 EPISODIC TENSION-TYPE HEADACHE, NOT INTRACTABLE: ICD-10-CM

## 2018-08-03 DIAGNOSIS — I10 HYPERTENSION GOAL BP (BLOOD PRESSURE) < 140/90: Primary | ICD-10-CM

## 2018-08-03 DIAGNOSIS — Z22.7 LATENT TUBERCULOSIS BY BLOOD TEST: ICD-10-CM

## 2018-08-03 DIAGNOSIS — I10 BENIGN ESSENTIAL HYPERTENSION: ICD-10-CM

## 2018-08-03 DIAGNOSIS — R73.03 PREDIABETES: ICD-10-CM

## 2018-08-03 DIAGNOSIS — Z79.899 ON ISONIAZID THERAPY: ICD-10-CM

## 2018-08-03 DIAGNOSIS — Z11.4 SCREENING FOR HUMAN IMMUNODEFICIENCY VIRUS: ICD-10-CM

## 2018-08-03 LAB
ALBUMIN SERPL-MCNC: 3.7 G/DL (ref 3.4–5)
ALP SERPL-CCNC: 63 U/L (ref 40–150)
ALT SERPL W P-5'-P-CCNC: 23 U/L (ref 0–50)
ANION GAP SERPL CALCULATED.3IONS-SCNC: 6 MMOL/L (ref 3–14)
AST SERPL W P-5'-P-CCNC: 17 U/L (ref 0–45)
BILIRUB DIRECT SERPL-MCNC: <0.1 MG/DL (ref 0–0.2)
BILIRUB SERPL-MCNC: 0.6 MG/DL (ref 0.2–1.3)
BUN SERPL-MCNC: 11 MG/DL (ref 7–30)
CALCIUM SERPL-MCNC: 9.6 MG/DL (ref 8.5–10.1)
CHLORIDE SERPL-SCNC: 104 MMOL/L (ref 94–109)
CO2 SERPL-SCNC: 31 MMOL/L (ref 20–32)
CREAT SERPL-MCNC: 0.71 MG/DL (ref 0.52–1.04)
GFR SERPL CREATININE-BSD FRML MDRD: 82 ML/MIN/1.7M2
GLUCOSE SERPL-MCNC: 87 MG/DL (ref 70–99)
POTASSIUM SERPL-SCNC: 3.8 MMOL/L (ref 3.4–5.3)
PROT SERPL-MCNC: 7.8 G/DL (ref 6.8–8.8)
SODIUM SERPL-SCNC: 141 MMOL/L (ref 133–144)

## 2018-08-03 PROCEDURE — 80048 BASIC METABOLIC PNL TOTAL CA: CPT | Performed by: INTERNAL MEDICINE

## 2018-08-03 PROCEDURE — 99214 OFFICE O/P EST MOD 30 MIN: CPT | Performed by: INTERNAL MEDICINE

## 2018-08-03 PROCEDURE — 80076 HEPATIC FUNCTION PANEL: CPT | Performed by: INTERNAL MEDICINE

## 2018-08-03 PROCEDURE — 87389 HIV-1 AG W/HIV-1&-2 AB AG IA: CPT | Performed by: INTERNAL MEDICINE

## 2018-08-03 PROCEDURE — 36415 COLL VENOUS BLD VENIPUNCTURE: CPT | Performed by: INTERNAL MEDICINE

## 2018-08-03 RX ORDER — METOPROLOL SUCCINATE 25 MG/1
12.5 TABLET, EXTENDED RELEASE ORAL DAILY
Qty: 45 TABLET | Refills: 1 | Status: SHIPPED | OUTPATIENT
Start: 2018-08-03 | End: 2018-10-05

## 2018-08-03 RX ORDER — DILTIAZEM HYDROCHLORIDE 180 MG/1
180 CAPSULE, COATED, EXTENDED RELEASE ORAL DAILY
Qty: 90 CAPSULE | Refills: 1 | Status: SHIPPED | OUTPATIENT
Start: 2018-08-03 | End: 2019-01-08

## 2018-08-03 ASSESSMENT — PAIN SCALES - GENERAL: PAINLEVEL: NO PAIN (0)

## 2018-08-03 NOTE — NURSING NOTE
"Chief Complaint   Patient presents with     Hypertension     Health Maintenance     [id number]       Initial /82  Pulse 63  Temp 97.9  F (36.6  C) (Oral)  Resp 22  Wt 207 lb (93.9 kg)  SpO2 98%  BMI 34.45 kg/m2 Estimated body mass index is 34.45 kg/(m^2) as calculated from the following:    Height as of 4/30/18: 5' 5\" (1.651 m).    Weight as of this encounter: 207 lb (93.9 kg).  Medication Reconciliation: complete  Lesa Michael CMA  "

## 2018-08-03 NOTE — LETTER
August 7, 2018    Melissa Coronado  [address]  [place] [address]          Dear Melissa,    Your lab results are within normal limits.    Please use [employer] or call our clinic at [phone] if you have any questions.       Marina Crandall MD    Results for orders placed or performed in visit on 08/03/18   HIV Screening   Result Value Ref Range    HIV Antigen Antibody Combo Nonreactive NR^Nonreactive       Basic metabolic panel   Result Value Ref Range    Sodium 141 133 - 144 mmol/L    Potassium 3.8 3.4 - 5.3 mmol/L    Chloride 104 94 - 109 mmol/L    Carbon Dioxide 31 20 - 32 mmol/L    Anion Gap 6 3 - 14 mmol/L    Glucose 87 70 - 99 mg/dL    Urea Nitrogen 11 7 - 30 mg/dL    Creatinine 0.71 0.52 - 1.04 mg/dL    GFR Estimate 82 >60 mL/min/1.7m2    GFR Estimate If Black >90 >60 mL/min/1.7m2    Calcium 9.6 8.5 - 10.1 mg/dL   Hepatic panel (Albumin, ALT, AST, Bili, Alk Phos, TP)   Result Value Ref Range    Bilirubin Direct <0.1 0.0 - 0.2 mg/dL    Bilirubin Total 0.6 0.2 - 1.3 mg/dL    Albumin 3.7 3.4 - 5.0 g/dL    Protein Total 7.8 6.8 - 8.8 g/dL    Alkaline Phosphatase 63 40 - 150 U/L    ALT 23 0 - 50 U/L    AST 17 0 - 45 U/L

## 2018-08-03 NOTE — MR AVS SNAPSHOT
After Visit Summary   8/3/2018    Melissa Coronado    MRN: [id number]           Patient Information     Date Of Birth          1955        Visit Information        Provider Department      8/3/2018 9:50 AM Marina Crandall MD [facility]        Today's Diagnoses     Hypertension goal BP (blood pressure) < 140/90    -  1    Latent tuberculosis by blood test        Screening for human immunodeficiency virus          Care Instructions    For your back pain:  Please take the Extra strength Tylenol or acetaminophen 500mg tablets as 1,000mg every 8 hours (whether you have pain or not at that time) for 1-4 weeks until the pain is gone.  Use cooling or heating packs-whatever makes your pain better.  Return to clinic if not better after 2 weeks of the above treatment.    If you develop new or worse numbness or weakness of your legs or an inability to hold in urine or feces, please call the clinic promptly to discuss this. If you develop an inability to get your urine out, please call please call the clinic promptly to discuss this. If you are calling after regular business hours and cannot reach a live person in our clinic, I would advise you to go to the Emergency Room for these symptoms.         Regarding you elevated blood pressure:  It is possible that your blood pressure is elevated now due to your back pain.  Please come back for a free blood pressure check at our clinic pharmacy in 2 weeks. If it is above 149/89 at that point, please schedule an appointment with me, because we will need to add a new medication.           Follow-ups after your visit        Who to contact     If you have questions or need follow up information about today's clinic visit or your schedule please contact [facility] directly at [phone].  Normal or non-critical lab and imaging results will be communicated to you by MyChart, letter or phone within 4 business days after the clinic has  "received the results. If you do not hear from us within 7 days, please contact the clinic through [email or web address] or phone. If you have a critical or abnormal lab result, we will notify you by phone as soon as possible.  Submit refill requests through [email or web address] or call your pharmacy and they will forward the refill request to us. Please allow 3 business days for your refill to be completed.          Additional Information About Your Visit        [email or web address] Information     [email or web address] lets you send messages to your doctor, view your test results, renew your prescriptions, schedule appointments and more. To sign up, go to [email or web address] . Click on \"Log in\" on the left side of the screen, which will take you to the Welcome page. Then click on \"Sign up Now\" on the right side of the page.     You will be asked to enter the access code listed below, as well as some personal information. Please follow the directions to create your username and password.     Your access code is: [email or web address]  Expires: 10/21/2018  9:50 AM     Your access code will  in 90 days. If you need help or a new code, please call your [place] [facility] clinic or [phone].        Care EveryWhere ID     This is your Care EveryWhere ID. This could be used by other organizations to access your [facility] medical records  [id number]        Your Vitals Were     Pulse Temperature Respirations Pulse Oximetry BMI (Body Mass Index)       63 97.9  F (36.6  C) (Oral) 22 98% 34.45 kg/m2        Blood Pressure from Last 3 Encounters:   18 160/80   18 143/65   18 148/80    Weight from Last 3 Encounters:   18 207 lb (93.9 kg)   18 209 lb (94.8 kg)   18 212 lb (96.2 kg)              We Performed the Following     Basic metabolic panel     HIV Screening        Primary Care Provider Office Phone # Fax Andree Crandall [phone] [address] [facility] [address]        Equal Access to Services     " [facility]: Hadii aad ku corbin Sohu, waaxda luqadaha, qaybta kaalmada ademathew, leonila darci sonjena renecarmenshirley barnes . [facility] [phone].    ATENCIÓN: Si habla español, tiene a sprague disposición servicios gratuitos de asistencia lingüística. Llame al [phone].    We comply with applicable federal civil rights laws and [place] laws. We do not discriminate on the basis of race, color, national origin, age, disability, sex, sexual orientation, or gender identity.            Thank you!     [facility] you for [facility]  [facility]. Our goal is always to provide you with excellent care. Hearing back from our patients is one way we can continue to improve our services. Please take a few minutes to complete the written survey that you may receive in the mail after your visit with us. Thank you!             Your Updated Medication List - Protect others around you: Learn how to safely use, store and throw away your medicines at www.disposemymeds.org.          This list is accurate as of 8/3/18 10:19 AM.  Always use your most recent med list.                   Brand Name Dispense Instructions for use Diagnosis    ALEVE 220 MG tablet   Generic drug:  naproxen sodium      Take by mouth 2 times daily (with meals)        diltiazem 180 MG 24 hr capsule     90 capsule    Take 1 capsule (180 mg) by mouth daily    Hypertension goal BP (blood pressure) < 140/90       isoniazid 300 MG tablet    NYDRAZID    90 tablet    Take 1 tablet (300 mg) by mouth daily    Latent tuberculosis by blood test       metFORMIN 500 MG tablet    GLUCOPHAGE    90 tablet    Take 1 tablet (500 mg) by mouth daily (with breakfast)    Prediabetes       metoprolol succinate 25 MG 24 hr tablet    TOPROL-XL    45 tablet    Take 0.5 tablets (12.5 mg) by mouth daily    Benign essential hypertension, Episodic tension-type headache, not intractable       pyridOXINE 50 MG tablet    VITAMIN B-6    90 tablet    Take 1 tablet (50  mg) by mouth daily    Latent tuberculosis by blood test       * TYLENOL EXTRA STRENGTH PO      Take 500 mg by mouth Reported on 4/13/2017        * acetaminophen 325 MG tablet    TYLENOL    100 tablet    Take 2 tablets (650 mg) by mouth every 6 hours as needed for mild pain    Back strain, subsequent encounter       * Notice:  This list has 2 medication(s) that are the same as other medications prescribed for you. Read the directions carefully, and ask your doctor or other care provider to review them with you.

## 2018-08-03 NOTE — PATIENT INSTRUCTIONS
For your back pain:  Please take the Extra strength Tylenol or acetaminophen 500mg tablets as 1,000mg every 8 hours (whether you have pain or not at that time) for 1-4 weeks until the pain is gone.  Use cooling or heating packs-whatever makes your pain better.  Return to clinic if not better after 2 weeks of the above treatment.    If you develop new or worse numbness or weakness of your legs or an inability to hold in urine or feces, please call the clinic promptly to discuss this. If you develop an inability to get your urine out, please call please call the clinic promptly to discuss this. If you are calling after regular business hours and cannot reach a live person in our clinic, I would advise you to go to the Emergency Room for these symptoms.         Regarding you elevated blood pressure:  It is possible that your blood pressure is elevated now due to your back pain.  Please come back for a free blood pressure check at our clinic pharmacy in 2 weeks. If it is above 149/89 at that point, please schedule an appointment with me, because we will need to add a new medication.

## 2018-08-04 ASSESSMENT — PATIENT HEALTH QUESTIONNAIRE - PHQ9: SUM OF ALL RESPONSES TO PHQ QUESTIONS 1-9: 0

## 2018-08-06 LAB — HIV 1+2 AB+HIV1 P24 AG SERPL QL IA: NONREACTIVE

## 2018-08-07 NOTE — PROGRESS NOTES
Please send letter informing the patient and attach results:    Dear Melissa,     Your lab results are within normal limits.    Please use [employer] or call our clinic at [phone] if you have any questions.       Marina Crandall MD

## 2018-08-08 PROBLEM — R73.03 PREDIABETES: Status: ACTIVE | Noted: 2018-08-08

## 2018-08-27 ENCOUNTER — TELEPHONE (OUTPATIENT)
Dept: FAMILY MEDICINE | Facility: CLINIC | Age: 63
End: 2018-08-27

## 2018-08-27 NOTE — TELEPHONE ENCOUNTER
Attempted to call pharmacy, placed on hold for > 6 minutes.  Will try again tomorrow.  Iris Su RN

## 2018-08-27 NOTE — TELEPHONE ENCOUNTER
Pharmacy states they would like a request for Statin drug for the patient.  Please advise.  Thank You

## 2018-08-27 NOTE — TELEPHONE ENCOUNTER
Pharmacy is suggesting a statin drug for patient because she is a diabetic.  Pharmacy has already spoken with patient about it and patient is agreeable.  Please advise.  Nina Hutchison RN

## 2018-08-27 NOTE — TELEPHONE ENCOUNTER
Patient's last A1C a little less than 6 months ago was 6.1% making her prediabetic but not diabetic.  Correia the pharmacy have some sort of additional lab information after 3/2018 where the patient was diagnosed with diabetes, outside of [place]? Please let me know.    Otherwise, there is no current indication for a statin in this patient.    Thank you,  Marina Crandall MD

## 2018-08-28 NOTE — TELEPHONE ENCOUNTER
Called and spoke with pharmacist.   Informed him that patient is not diabetic so he asked why the patient is taking metformin and this RN informed him it is for pre-diabetes.   He said it is still recommended for pre-diabetic patients also but maybe a low-dose statin.   Informed pharmacist I will forward this onto PCP to advise.    Baylee Perry, RENÉN RN

## 2018-08-28 NOTE — TELEPHONE ENCOUNTER
Thank you.  I would hold off on starting a statin d/t some history of loss to follow-up and the need to address the patient's more active issues, ie hypertension.  The need for statins may certainly be addressed by the patient's future PMD.    Thank you,  Marina Crandall MD

## 2018-10-05 ENCOUNTER — RADIANT APPOINTMENT (OUTPATIENT)
Dept: GENERAL RADIOLOGY | Facility: CLINIC | Age: 63
End: 2018-10-05
Attending: NURSE PRACTITIONER
Payer: COMMERCIAL

## 2018-10-05 ENCOUNTER — OFFICE VISIT (OUTPATIENT)
Dept: FAMILY MEDICINE | Facility: CLINIC | Age: 63
End: 2018-10-05
Payer: COMMERCIAL

## 2018-10-05 VITALS
DIASTOLIC BLOOD PRESSURE: 82 MMHG | SYSTOLIC BLOOD PRESSURE: 160 MMHG | BODY MASS INDEX: 34.45 KG/M2 | HEART RATE: 51 BPM | OXYGEN SATURATION: 98 % | HEIGHT: 65 IN | TEMPERATURE: 97.7 F

## 2018-10-05 DIAGNOSIS — M25.561 ACUTE PAIN OF RIGHT KNEE: ICD-10-CM

## 2018-10-05 DIAGNOSIS — E66.01 MORBID OBESITY (H): ICD-10-CM

## 2018-10-05 DIAGNOSIS — I10 HYPERTENSION GOAL BP (BLOOD PRESSURE) < 140/90: Primary | ICD-10-CM

## 2018-10-05 PROCEDURE — 99213 OFFICE O/P EST LOW 20 MIN: CPT | Performed by: NURSE PRACTITIONER

## 2018-10-05 PROCEDURE — 73560 X-RAY EXAM OF KNEE 1 OR 2: CPT | Mod: RT

## 2018-10-05 NOTE — PROGRESS NOTES
SUBJECTIVE:   Melissa Coronado is a 63 year old female who presents to clinic today for the following health issues:    Hypertension Follow-up      Outpatient blood pressures are not being checked.    Low Salt Diet: low salt      Amount of exercise or physical activity: 2-3 days/week for an average of 30-45 minutes    Problems taking medications regularly: Yes,  side effects    Medication side effects: weight gain    Diet: low salt    Patient stopped taking her BP medications 3 days ago as she felt it was causing her to gain weight.        She also has acute onset right posterior knee pain X 2 days, no clicking, locking, catching, no known injury/trauma.  No fever, chills, knee swelling, erythema, pain is worse with prolonged sitting, going up/.down stairs.    Problem list and histories reviewed & adjusted, as indicated.  Additional history: as documented    Patient Active Problem List   Diagnosis     Hypertension goal BP (blood pressure) < 140/90     Morbid obesity (H)     Latent tuberculosis by blood test     Prediabetes     No past surgical history on file.    Social History   Substance Use Topics     Smoking status: Never Smoker     Smokeless tobacco: Never Used     Alcohol use Yes      Comment: small amt     Family History   Problem Relation Age of Onset     Hernia Brother      Other - See Comments Son      suffers from sneezing disorder         Current Outpatient Prescriptions   Medication Sig Dispense Refill     Acetaminophen (TYLENOL EXTRA STRENGTH PO) Take 500 mg by mouth Reported on 4/13/2017       diltiazem 180 MG 24 hr capsule Take 1 capsule (180 mg) by mouth daily 90 capsule 1     isoniazid (NYDRAZID) 300 MG tablet Take 1 tablet (300 mg) by mouth daily 90 tablet 2     pyridOXINE (VITAMIN B-6) 50 MG tablet Take 1 tablet (50 mg) by mouth daily 90 tablet 2     metFORMIN (GLUCOPHAGE) 500 MG tablet Take 1 tablet (500 mg) by mouth daily (with breakfast) 90 tablet 0     BP Readings from Last 3 Encounters:  "  10/12/18 157/78   10/05/18 160/82   08/03/18 160/80    Wt Readings from Last 3 Encounters:   10/12/18 210 lb 12.8 oz (95.6 kg)   08/03/18 207 lb (93.9 kg)   04/30/18 209 lb (94.8 kg)                    Reviewed and updated as needed this visit by clinical staff  Tobacco  Allergies  Meds  Problems       Reviewed and updated as needed this visit by Provider  Tobacco  Problems         ROS:  Constitutional, HEENT, cardiovascular, pulmonary, gi and gu systems are negative, except as otherwise noted.    OBJECTIVE:     /82  Pulse 51  Temp 97.7  F (36.5  C) (Oral)  Ht 5' 5\" (1.651 m)  SpO2 98%  BMI 34.45 kg/m2  Body mass index is 34.45 kg/(m^2).  GENERAL: healthy, alert and no distress  EYES: Eyes grossly normal to inspection, PERRL and conjunctivae and sclerae normal  HENT: ear canals and TM's normal, nose and mouth without ulcers or lesions  NECK: no adenopathy, no asymmetry, masses, or scars and thyroid normal to palpation  RESP: lungs clear to auscultation - no rales, rhonchi or wheezes  CV: regular rate and rhythm, normal S1 S2, no S3 or S4, no murmur, click or rub, no peripheral edema and peripheral pulses strong  ABDOMEN: soft, nontender, no hepatosplenomegaly, no masses and bowel sounds normal  MS: right knee:  Swelling: none  Ecchymosis: none  Gait: normal  Squat: normal  Medial tenderness: none  Lateral tenderness: none  Anterior tenderness: around inferior aspect of patella  Posterior tenderness: mild, deep  Extension: full, mild pain  Flexion: normal  Patellar tracking / apprehension: intact/neg  Wil's Patellar compression test: neg  Mc Chowdhury's: neg  Lachman's / Anterior drawer: neg  Valgus stress: painful  Varus stress: intact    Distal CMS: iontact  Ipsilateral Hip: normal  Ipsilateral Ankle & Foot: normal    no gross musculoskeletal defects noted, no edema  SKIN: no suspicious lesions or rashes  NEURO: Normal strength and tone, mentation intact and speech normal  PSYCH: mentation appears " "normal, affect normal/bright  LYMPH: normal ant/post cervical, supraclavicular nodes    Diagnostic Test Results:  Xray - right knee- bone spurs noted and there is lateral joint space narrowing, no obvious fracture seen.  Await formal read by Radiology.    Exam Information   Exam Date Exam Time Accession # Performing Department Results    10/5/18  4:10 PM [id number] [facility]    [facility] Interactive Report and Info[facility]   View the interactive report   PACS Images   Show images for XR Knee Right 1/2 Views   Study Result   RIGHT KNEE TWO VIEWS   10/5/2018 4:10 PM     HISTORY: Acute posterior right knee pain.     COMPARISON: 6/3/2016.     FINDINGS: No fracture or osseous lesion is demonstrated. There has  been progression of moderately prominent joint space loss in the  lateral compartment with what are likely mild degenerative changes of  the patellofemoral joint. The medial compartment space may demonstrate  mild joint space loss. Marginal osteophytes are present along all of  the joint margins. No other abnormality or change is seen.         IMPRESSION: Progression of degenerative changes. No acute abnormality  is seen.      SARA FLORES MD         ASSESSMENT/PLAN:         BMI:   Estimated body mass index is 34.45 kg/(m^2) as calculated from the following:    Height as of this encounter: 5' 5\" (1.651 m).    Weight as of 8/3/18: 207 lb (93.9 kg).   Weight management plan: Discussed healthy diet and exercise guidelines and patient will follow up in 6 months in clinic to re-evaluate.      1. Hypertension goal BP (blood pressure) < 140/90  Patient needs to get back on her BP medication as it is not likely causing weight gain.  Reviewed indications, dosing, potential side effects.  Encouraged her to work on losing weight, get regular exercise, low salt diet (DASH discussed). Return to clinic 1 week for BP recheck- nurse visit OK.    2. Morbid obesity (H)  Benefits of weight loss " reviewed in detail, encouraged her to cut back on the carbohydrates in the diet, consume more fruits and vegetables, drink plenty of water, avoid fruit juices, sodas, get 150 min moderate exercise/week.  Recheck weight in 6 months.      3. Acute pain of right knee  No acute changes noted- she has some degenerative changes but no fracture.  Ok to continue with NSAID, offered physical therapy which she declined.  Consider [facility] for steroid injection if not improved, new/worsening symptoms.  - XR Knee Right 1/2 Views; Future    See Patient Instructions    [facility]AM[facility]

## 2018-10-05 NOTE — PATIENT INSTRUCTIONS
At [facility], we strive to deliver an exceptional experience to you, every time we see you.  If you receive a survey in the mail, please send us back your thoughts. We really do value your feedback.    Your care team:                            Family Medicine Internal Medicine   MD Shane Villanueva MD Shantel Branch-Fleming, MD Katya Georgiev PA-C Megan Hill, APRN CNP    Charles Deleon, MD Pediatrics   Jack Aguayo, HOLLIE Brown, MD Savannah Naylor APRN CNP   MD Shannan Chase MD Deborah Mielke, MD Nina Murrell, APRN [facility]      Clinic hours: Monday - Thursday 7 am-7 pm; Fridays 7 am-5 pm.   Urgent care: Monday - Friday 11 am-9 pm; Saturday and Sunday 9 am-5 pm.  Pharmacy : Monday -Thursday 8 am-8 pm; Friday 8 am-6 pm; Saturday and Sunday 9 am-5 pm.     Clinic: [phone]   Pharmacy: [phone]        Uncontrolled High Blood Pressure (Established)    Your blood pressure was unusually high today. This can occur if you ve missed doses of your blood pressure medicine. Or it can happen if you are taking other medicines. These include some asthma inhalers, decongestants, diet pills, and street drugs like cocaine and amphetamine.  Other causes include:    Weight gain    More salt in your diet    Smoking    Caffeine  Your blood pressure can also rise if you are emotionally upset or in intense pain. It may go back to normal after a period of rest.  Blood pressure measurements are given as 2 numbers. Systolic blood pressure is the upper number. This is the pressure when the heart contracts. Diastolic blood pressure is the lower number. This is the pressure when the heart relaxes between beats. You will see your blood pressure readings written together. For example, a person with a systolic pressure of 118 and a diastolic pressure of 78 will have 118/78 written in the medical record. To be high blood pressure, the numbers must be higher when  tested over a period of time.  Blood pressure is categorized as normal, elevated, or stage 1 or stage 2 high blood pressure:    Normal blood pressure is systolic of less than 120 and diastolic of less than 80 (120/80)    Elevated blood pressure is systolic of 120 to 129 and diastolic less than 80    Stage 1 high blood pressure is systolic is 130 to 139 or diastolic between 80 to 89    Stage 2 high blood pressure is when systolic is 140 or higher or the diastolic is 90 or higher  Uncontrolled high blood pressure can cause serious health problems. It raises your risk for heart attack, stroke, and heart failure. In general, if you have high blood pressure, keeping your blood pressure below 130/80 mmHg may help prevent these problems. Your healthcare provider may prescribe medicine to help control blood pressure if lifestyle changes are not enough.  Home care  It s important to take steps to lower your blood pressure. If you are taking blood pressure medicine, the guidelines below may help you need less or no medicines in the future.    Start a weight-loss program if you are overweight.    Cut back on the amount of salt in your diet:  ? Avoid high-salt foods like olives, pickles, smoked meats, and salted potato chips.  ? Don t add salt to your food at the table.  ? Use only small amounts of salt when cooking.    Start an exercise program. Talk with your healthcare provider about what exercise program is best for you. It doesn t have to be difficult. Even brisk walking for 20 minutes 3 times a week is a good form of exercise.    Avoid medicines that stimulates the heart. This includes many over-the-counter cold and sinus decongestant pills and sprays, as well as diet pills. Check the warnings about high blood pressure on the label. Before purchasing any over-the-counter medicines or supplements, always ask the pharmacist about the product's potential interaction with your high blood pressure and your  medicines.    Stimulants such as amphetamine or cocaine could be lethal for someone with high blood pressure. Never take these.    Limit how much caffeine you drink. Or switch to noncaffeinated beverages.    Stop smoking. If you are a long-time smoker, this can be hard. Enroll in a stop-smoking program to make it more likely that you will succeed. Talk with your provider about ways to quit.    Learn how to handle stress better. This is an important part of any program to lower blood pressure. Learn ways to relax. These include meditation, yoga, and biofeedback.    If medicines were prescribed, take them exactly as directed. Missing doses may cause your blood pressure to get out of control.    If you miss a dose or doses of your medicines, check with your healthcare provider or pharmacist about what to do.    Consider buying an automatic blood pressure machine. Your provider may recommend a certain type. You can get one of these at most pharmacies. Measure your blood pressure twice a day, in the morning, and in the late afternoon. Keep a written record of your home blood pressure readings and take the record to your medical appointments.  Here are some additional guidelines on home blood pressure monitoring from the American [employer] Association.    Don't smoke or drink coffee for 30 minutes    Go to the bathroom before the test.    Relax for 5 minutes before taking the measurement.    Sit correctly. Be sure your back is supported. Don't sit on a couch or soft chair. Uncross your feet and place them flat on the floor. Place your arm on a solid, flat surface like a table with the upper arm at heart level. Make certain the middle of the cuff is directly above the bend of the elbow. Check the monitor's instruction manual for an illustration.    Take multiple readings. When you measure, take 2 or 3 readings one minute apart and record all of the results.    Take your blood pressure at the same time every day, or as your  healthcare provider recommends.    Record the date, time, and blood pressure reading.    Take the record with you to your next appointment. If your blood pressure monitor has a built-in memory, simply take the monitor with you to your next appointment.    Call your provider if you have several high readings. Don't be frightened by a single high reading, but if you get several high readings, check in with your healthcare provider.    Note: When blood pressure reaches a systolic (top number) of 180 or higher or a diastolic (bottom number) of 110 or higher, emergency medical treatment is required. Call your healthcare provider immediately.  Follow-up care  Regular visits to your own healthcare provider for blood pressure and medicine checks are an important part of your care. Make a follow-up appointment as directed. Bring the record of your home blood pressure readings to the appointment.  When to seek medical advice  Call your healthcare provider right away if any of these occur:    Blood pressure reaches a systolic (top number) of 180 or higher or diastolic (bottom number) of 110 or higher, emergency medical treatment is required.    Chest, arm, shoulder, neck, or upper back pain    Shortness of breath    Severe headache    Throbbing or rushing sound in the ears    Nosebleed    Extreme drowsiness, confusion, or fainting    Dizziness or dizziness with spinning sensation (vertigo)    Weakness in an arm or leg or on one side of the face    Trouble speaking or seeing   Date Last Reviewed: 1/1/2017 2000-2017 [employer]. [address] [place] [id number]. All rights reserved. This information is not intended as a substitute for professional medical care. Always follow your healthcare professional's instructions.    CONTINUE TO TAKE THE DILTIAZEM 180 MG DAILY BUT STOP THE TOPROL XL  YOUR HEART RATE IS LOW. PLEASE RETURN TO THE CLINIC IN 1 WEEK FOR A BP RECHECK.

## 2018-10-05 NOTE — MR AVS SNAPSHOT
After Visit Summary   10/5/2018    Melissa Coronado    MRN: [id number]           Patient Information     Date Of Birth          1955        Visit Information        Provider Department      10/5/2018 2:40 PM Hortencia Murrell APRN CNP [facility]        Today's Diagnoses     Hypertension goal BP (blood pressure) < 140/90    -  1    Morbid obesity (H)          Care Instructions    At [facility], we strive to deliver an exceptional experience to you, every time we see you.  If you receive a survey in the mail, please send us back your thoughts. We really do value your feedback.    Your care team:                            Family Medicine Internal Medicine   MD Shane Villanueva MD Shantel Branch-Fleming, MD Katya Georgiev PA-C Megan Hill, APRN CNP Nam Ho, MD Pediatrics   HOLLIE Flores, MD Savannah Naylor CNP, MD Bethany Templen, MD Deborah Mielke, MD Kim Thein, APRN CNP      Clinic hours: Monday - Thursday 7 am-7 pm; Fridays 7 am-5 pm.   Urgent care: Monday - Friday 11 am-9 pm; Saturday and Sunday 9 am-5 pm.  Pharmacy : Monday -Thursday 8 am-8 pm; Friday 8 am-6 pm; Saturday and Sunday 9 am-5 pm.     Clinic: [phone]   Pharmacy: [phone]        Uncontrolled High Blood Pressure (Established)    Your blood pressure was unusually high today. This can occur if you ve missed doses of your blood pressure medicine. Or it can happen if you are taking other medicines. These include some asthma inhalers, decongestants, diet pills, and street drugs like cocaine and amphetamine.  Other causes include:    Weight gain    More salt in your diet    Smoking    Caffeine  Your blood pressure can also rise if you are emotionally upset or in intense pain. It may go back to normal after a period of rest.  Blood pressure measurements are given as 2 numbers. Systolic blood pressure is the  upper number. This is the pressure when the heart contracts. Diastolic blood pressure is the lower number. This is the pressure when the heart relaxes between beats. You will see your blood pressure readings written together. For example, a person with a systolic pressure of 118 and a diastolic pressure of 78 will have 118/78 written in the medical record. To be high blood pressure, the numbers must be higher when tested over a period of time.  Blood pressure is categorized as normal, elevated, or stage 1 or stage 2 high blood pressure:    Normal blood pressure is systolic of less than 120 and diastolic of less than 80 (120/80)    Elevated blood pressure is systolic of 120 to 129 and diastolic less than 80    Stage 1 high blood pressure is systolic is 130 to 139 or diastolic between 80 to 89    Stage 2 high blood pressure is when systolic is 140 or higher or the diastolic is 90 or higher  Uncontrolled high blood pressure can cause serious health problems. It raises your risk for heart attack, stroke, and heart failure. In general, if you have high blood pressure, keeping your blood pressure below 130/80 mmHg may help prevent these problems. Your healthcare provider may prescribe medicine to help control blood pressure if lifestyle changes are not enough.  Home care  It s important to take steps to lower your blood pressure. If you are taking blood pressure medicine, the guidelines below may help you need less or no medicines in the future.    Start a weight-loss program if you are overweight.    Cut back on the amount of salt in your diet:  ? Avoid high-salt foods like olives, pickles, smoked meats, and salted potato chips.  ? Don t add salt to your food at the table.  ? Use only small amounts of salt when cooking.    Start an exercise program. Talk with your healthcare provider about what exercise program is best for you. It doesn t have to be difficult. Even brisk walking for 20 minutes 3 times a week is a good  form of exercise.    Avoid medicines that stimulates the heart. This includes many over-the-counter cold and sinus decongestant pills and sprays, as well as diet pills. Check the warnings about high blood pressure on the label. Before purchasing any over-the-counter medicines or supplements, always ask the pharmacist about the product's potential interaction with your high blood pressure and your medicines.    Stimulants such as amphetamine or cocaine could be lethal for someone with high blood pressure. Never take these.    Limit how much caffeine you drink. Or switch to noncaffeinated beverages.    Stop smoking. If you are a long-time smoker, this can be hard. Enroll in a stop-smoking program to make it more likely that you will succeed. Talk with your provider about ways to quit.    Learn how to handle stress better. This is an important part of any program to lower blood pressure. Learn ways to relax. These include meditation, yoga, and biofeedback.    If medicines were prescribed, take them exactly as directed. Missing doses may cause your blood pressure to get out of control.    If you miss a dose or doses of your medicines, check with your healthcare provider or pharmacist about what to do.    Consider buying an automatic blood pressure machine. Your provider may recommend a certain type. You can get one of these at most pharmacies. Measure your blood pressure twice a day, in the morning, and in the late afternoon. Keep a written record of your home blood pressure readings and take the record to your medical appointments.  Here are some additional guidelines on home blood pressure monitoring from the American Heart Association.    Don't smoke or drink coffee for 30 minutes    Go to the bathroom before the test.    Relax for 5 minutes before taking the measurement.    Sit correctly. Be sure your back is supported. Don't sit on a couch or soft chair. Uncross your feet and place them flat on the floor. Place  your arm on a solid, flat surface like a table with the upper arm at heart level. Make certain the middle of the cuff is directly above the bend of the elbow. Check the monitor's instruction manual for an illustration.    Take multiple readings. When you measure, take 2 or 3 readings one minute apart and record all of the results.    Take your blood pressure at the same time every day, or as your healthcare provider recommends.    Record the date, time, and blood pressure reading.    Take the record with you to your next appointment. If your blood pressure monitor has a built-in memory, simply take the monitor with you to your next appointment.    Call your provider if you have several high readings. Don't be frightened by a single high reading, but if you get several high readings, check in with your healthcare provider.    Note: When blood pressure reaches a systolic (top number) of 180 or higher or a diastolic (bottom number) of 110 or higher, emergency medical treatment is required. Call your healthcare provider immediately.  Follow-up care  Regular visits to your own healthcare provider for blood pressure and medicine checks are an important part of your care. Make a follow-up appointment as directed. Bring the record of your home blood pressure readings to the appointment.  When to seek medical advice  Call your healthcare provider right away if any of these occur:    Blood pressure reaches a systolic (top number) of 180 or higher or diastolic (bottom number) of 110 or higher, emergency medical treatment is required.    Chest, arm, shoulder, neck, or upper back pain    Shortness of breath    Severe headache    Throbbing or rushing sound in the ears    Nosebleed    Extreme drowsiness, confusion, or fainting    Dizziness or dizziness with spinning sensation (vertigo)    Weakness in an arm or leg or on one side of the face    Trouble speaking or seeing   Date Last Reviewed: 1/1/2017 2000-2017 [facility]  "[employer]. [address] [place] [address]. All rights reserved. This information is not intended as a substitute for professional medical care. Always follow your healthcare professional's instructions.    CONTINUE TO TAKE THE DILTIAZEM 180 MG DAILY BUT STOP THE TOPROL XL  YOUR HEART RATE IS LOW. PLEASE RETURN TO THE CLINIC IN 1 WEEK FOR A BP RECHECK.            Follow-ups after your visit        Follow-up notes from your care team     Return in about 1 week (around 10/12/2018), or if symptoms worsen or fail to improve, for BP Recheck.      Who to contact     If you have questions or need follow up information about today's clinic visit or your schedule please contact [facility] directly at [phone].  Normal or non-critical lab and imaging results will be communicated to you by Pulsarhart, letter or phone within 4 business days after the clinic has received the results. If you do not hear from us within 7 days, please contact the clinic through Pulsarhart or phone. If you have a critical or abnormal lab result, we will notify you by phone as soon as possible.  Submit refill requests through bepretty or call your pharmacy and they will forward the refill request to us. Please allow 3 business days for your refill to be completed.          Additional Information About Your Visit        Pulsarhart Information     bepretty lets you send messages to your doctor, view your test results, renew your prescriptions, schedule appointments and more. To sign up, go to [email or web address] . Click on \"Log in\" on the left side of the screen, which will take you to the Welcome page. Then click on \"Sign up Now\" on the right side of the page.     You will be asked to enter the access code listed below, as well as some personal information. Please follow the directions to create your username and password.     Your access code is: [email or web address]  Expires: 10/21/2018  9:50 AM     Your access code will  " "in 90 days. If you need help or a new code, please call your [place] clinic or [phone].        Care EveryWhere ID     This is your Care EveryWhere ID. This could be used by other organizations to access your [place] medical records  [id number]        Your Vitals Were     Pulse Temperature Height Pulse Oximetry BMI (Body Mass Index)       51 97.7  F (36.5  C) (Oral) 5' 5\" (1.651 m) 98% 34.45 kg/m2        Blood Pressure from Last 3 Encounters:   10/05/18 160/82   08/03/18 160/80   04/30/18 143/65    Weight from Last 3 Encounters:   08/03/18 207 lb (93.9 kg)   04/30/18 209 lb (94.8 kg)   03/16/18 212 lb (96.2 kg)              Today, you had the following     No orders found for display         Today's Medication Changes          These changes are accurate as of 10/5/18  3:32 PM.  If you have any questions, ask your nurse or doctor.               Stop taking these medicines if you haven't already. Please contact your care team if you have questions.     metoprolol succinate 25 MG 24 hr tablet   Commonly known as:  TOPROL-XL   Stopped by:  Hortencia Murrell APRN CNP                    Primary Care Provider Office Phone # Fax #    Marina Crandall [phone] [address] [facility] [address]        Equal Access to Services     [facility]: Hadii aad ku hadasho Sohu, waaxda luqadaha, qaybta kaalmada leonila celaya. So [facility] [phone].    ATENCIÓN: Si habla español, tiene a sprague disposición servicios gratuitos de asistencia lingüística. Llame al [phone].    We comply with applicable federal civil rights laws and [place] laws. We do not discriminate on the basis of race, color, national origin, age, disability, sex, sexual orientation, or gender identity.            Thank you!     Thank you for choosing [facility]  for your care. Our goal is always to provide you with excellent care. Hearing back from our " patients is one way we can continue to improve our services. Please take a few minutes to complete the written survey that you may receive in the mail after your visit with us. Thank you!             Your Updated Medication List - Protect others around you: Learn how to safely use, store and throw away your medicines at www.disposemymeds.org.          This list is accurate as of 10/5/18  3:32 PM.  Always use your most recent med list.                   Brand Name Dispense Instructions for use Diagnosis    diltiazem 180 MG 24 hr capsule     90 capsule    Take 1 capsule (180 mg) by mouth daily    Hypertension goal BP (blood pressure) < 140/90       isoniazid 300 MG tablet    NYDRAZID    90 tablet    Take 1 tablet (300 mg) by mouth daily    Latent tuberculosis by blood test       metFORMIN 500 MG tablet    GLUCOPHAGE    90 tablet    Take 1 tablet (500 mg) by mouth daily (with breakfast)    Prediabetes       pyridOXINE 50 MG tablet    VITAMIN B-6    90 tablet    Take 1 tablet (50 mg) by mouth daily    Latent tuberculosis by blood test       TYLENOL EXTRA STRENGTH PO      Take 500 mg by mouth Reported on 4/13/2017

## 2018-10-12 ENCOUNTER — OFFICE VISIT (OUTPATIENT)
Dept: FAMILY MEDICINE | Facility: CLINIC | Age: 63
End: 2018-10-12
Payer: COMMERCIAL

## 2018-10-12 ENCOUNTER — TELEPHONE (OUTPATIENT)
Dept: FAMILY MEDICINE | Facility: CLINIC | Age: 63
End: 2018-10-12

## 2018-10-12 VITALS
DIASTOLIC BLOOD PRESSURE: 78 MMHG | OXYGEN SATURATION: 98 % | WEIGHT: 210.8 LBS | HEART RATE: 59 BPM | SYSTOLIC BLOOD PRESSURE: 157 MMHG | BODY MASS INDEX: 35.08 KG/M2 | TEMPERATURE: 98.1 F | RESPIRATION RATE: 18 BRPM

## 2018-10-12 DIAGNOSIS — R73.03 PREDIABETES: ICD-10-CM

## 2018-10-12 DIAGNOSIS — R42 DIZZINESS: ICD-10-CM

## 2018-10-12 DIAGNOSIS — R51.9 NONINTRACTABLE EPISODIC HEADACHE, UNSPECIFIED HEADACHE TYPE: Primary | ICD-10-CM

## 2018-10-12 PROBLEM — I10 BENIGN ESSENTIAL HYPERTENSION: Status: RESOLVED | Noted: 2017-08-21 | Resolved: 2018-10-12

## 2018-10-12 PROCEDURE — 99213 OFFICE O/P EST LOW 20 MIN: CPT | Performed by: FAMILY MEDICINE

## 2018-10-12 NOTE — PROGRESS NOTES
CHIEF COMPLAINT    Headaches.  Dizziness.      HISTORY    This patient used to see .  She is on medication for BP.  Also treating tuberculosis.  Prediabetes, on metformin.    She describes a concern about headaches today.  They come intermittently.  Tend to begin in the occipital area.  She states she has had these since she came here from [place] in December 2015.  She is also complaining of some dizziness which I actually believe she is describing vertigo.  She talks about feeling like she is in motion episodically.  She is not having localized weakness or speech problems.      Patient Active Problem List   Diagnosis     Hypertension goal BP (blood pressure) < 140/90     Morbid obesity (H)     Latent tuberculosis by blood test     Prediabetes     Current Outpatient Prescriptions   Medication Sig Dispense Refill     Acetaminophen (TYLENOL EXTRA STRENGTH PO) Take 500 mg by mouth Reported on 4/13/2017       diltiazem 180 MG 24 hr capsule Take 1 capsule (180 mg) by mouth daily 90 capsule 1     isoniazid (NYDRAZID) 300 MG tablet Take 1 tablet (300 mg) by mouth daily 90 tablet 2     metFORMIN (GLUCOPHAGE) 500 MG tablet Take 1 tablet (500 mg) by mouth daily (with breakfast) 90 tablet 0     pyridOXINE (VITAMIN B-6) 50 MG tablet Take 1 tablet (50 mg) by mouth daily 90 tablet 2     [DISCONTINUED] metFORMIN (GLUCOPHAGE) 500 MG tablet Take 1 tablet (500 mg) by mouth daily (with breakfast) 90 tablet 0       REVIEW OF SYSTEMS    No fevers.  No breathing problems.  No significant cough.  No chest pains.  No abdominal pain.      SOCIAL HISTORY    Patient came from [place], [place].      History reviewed. No pertinent past medical history.      EXAM  /78  Pulse 59  Temp 98.1  F (36.7  C) (Oral)  Resp 18  Wt 210 lb 12.8 oz (95.6 kg)  SpO2 98%  BMI 35.08 kg/m2    HEENT: PE RRL, face symmetrical, speech clear.  Neck: No masses.  Chest: Clear.  Cardiac: Regular rhythm, no murmur.  Extremities: No  obvious weakness.  No edema.  Gait: Somewhat unsteady although she walks independently.      (R51) Nonintractable episodic headache, unspecified headache type  (primary encounter diagnosis)  Comment:   Plan: CT Head w/o Contrast            (R73.03) Prediabetes  Comment: A1c in March 2018 was 6.1.  Probably not a factor in above concerns.  Plan: metFORMIN (GLUCOPHAGE) 500 MG tablet            (R42) Dizziness  Comment:   Dizziness or vertigo.  We will plan to obtain CT.  Reevaluate in light of this.  I did ask her to establish with another primary provider here.  Plan: CT Head w/o Contrast

## 2018-10-12 NOTE — TELEPHONE ENCOUNTER
Panel Management Review      Patient has the following on her problem list:     Hypertension   Last three blood pressure readings:  BP Readings from Last 3 Encounters:   10/12/18 157/78   10/05/18 160/82   08/03/18 160/80     Blood pressure: FAILED    HTN Guidelines:  Age 18-59 BP range:  Less than 140/90  Age 60-85 with Diabetes:  Less than 140/90  Age 60-85 without Diabetes:  less than 150/90      Composite cancer screening  Chart review shows that this patient is due/due soon for the following None  Summary:    Patient is due/failing the following:   BP CHECK    Action needed:   Patient needs office visit for recheck.    Type of outreach:    none- Patient was seen in clinic by Dr Zee. He suggested she make an appointment with Dr Pickering to establish care and address multiple issues.    Questions for provider review:    None                                                                                                                                    Evelyn Ayala CMA       Chart routed to close .

## 2018-10-12 NOTE — MR AVS SNAPSHOT
After Visit Summary   10/12/2018    Meilssa Coronado    MRN: [id number]           Patient Information     Date Of Birth          1955        Visit Information        Provider Department      10/12/2018 10:20 AM Zohaib Zee MD [facility]        Today's Diagnoses     Nonintractable episodic headache, unspecified headache type    -  1    Prediabetes        Dizziness          Care Instructions      CT scan of brain.      See Dr Crocker for follow up in next 1-2 weeks.          Follow-ups after your visit        Your next 10 appointments already scheduled     Jan 11, 2019  9:40 AM CST   PHYSICAL with Yodit Pickering MD   [facility] ([facility]    [address] [facility] [address] [phone]              Future tests that were ordered for you today     Open Future Orders        Priority Expected Expires Ordered    CT Head w/o Contrast Routine  10/12/2019 10/12/2018            Who to contact     If you have questions or need follow up information about today's clinic visit or your schedule please contact [facility] directly at [phone].  Normal or non-critical lab and imaging results will be communicated to you by MyChart, letter or phone within 4 business days after the clinic has received the results. If you do not hear from us within 7 days, please contact the clinic through MyChart or phone. If you have a critical or abnormal lab result, we will notify you by phone as soon as possible.  Submit refill requests through ChoiceMap or call your pharmacy and they will forward the refill request to us. Please allow 3 business days for your refill to be completed.          Additional Information About Your Visit        Care EveryWhere ID     This is your Care EveryWhere ID. This could be used by other organizations to access your [place] medical records  [id number]        Your Vitals Were     Pulse Temperature Respirations Pulse  Oximetry BMI (Body Mass Index)       59 98.1  F (36.7  C) (Oral) 18 98% 35.08 kg/m2        Blood Pressure from Last 3 Encounters:   10/12/18 157/78   10/05/18 160/82   08/03/18 160/80    Weight from Last 3 Encounters:   10/12/18 210 lb 12.8 oz (95.6 kg)   08/03/18 207 lb (93.9 kg)   04/30/18 209 lb (94.8 kg)                 Where to get your medicines      These medications were sent to [id number] - [place], [place] - [address] [place] [address] [place] [address] [place] [address]     Phone:  [phone]     metFORMIN 500 MG tablet          Primary Care Provider Fax #    Physician No Ref-Primary [phone]       No address on file        Equal Access to Services     [facility] : Juan Francisco alaniz Sohu, waaxda luthi, qajayta kaalmada belia, leonila barnes . So [facility] [phone].    ATENCIÓN: Si habla español, tiene a sprague disposición servicios gratuitos de asistencia lingüística. Llame al [phone].    We comply with applicable federal civil rights laws and [place] laws. We do not discriminate on the basis of race, color, national origin, age, disability, sex, sexual orientation, or gender identity.            Thank you!     Thank you for choosing [facility]  for your care. Our goal is always to provide you with excellent care. Hearing back from our patients is one way we can continue to improve our services. Please take a few minutes to complete the written survey that you may receive in the mail after your visit with us. Thank you!             Your Updated Medication List - Protect others around you: Learn how to safely use, store and throw away your medicines at www.disposemymeds.org.          This list is accurate as of 10/12/18 10:41 AM.  Always use your most recent med list.                   Brand Name Dispense Instructions for use Diagnosis    diltiazem 180 MG 24 hr capsule     90 capsule    Take 1 capsule (180 mg) by  mouth daily    Hypertension goal BP (blood pressure) < 140/90       isoniazid 300 MG tablet    NYDRAZID    90 tablet    Take 1 tablet (300 mg) by mouth daily    Latent tuberculosis by blood test       metFORMIN 500 MG tablet    GLUCOPHAGE    90 tablet    Take 1 tablet (500 mg) by mouth daily (with breakfast)    Prediabetes       pyridOXINE 50 MG tablet    VITAMIN B-6    90 tablet    Take 1 tablet (50 mg) by mouth daily    Latent tuberculosis by blood test       TYLENOL EXTRA STRENGTH PO      Take 500 mg by mouth Reported on 4/13/2017

## 2018-10-23 ENCOUNTER — RADIANT APPOINTMENT (OUTPATIENT)
Dept: CT IMAGING | Facility: CLINIC | Age: 63
End: 2018-10-23
Attending: FAMILY MEDICINE
Payer: COMMERCIAL

## 2018-10-23 DIAGNOSIS — R51.9 NONINTRACTABLE EPISODIC HEADACHE, UNSPECIFIED HEADACHE TYPE: ICD-10-CM

## 2018-10-23 DIAGNOSIS — R42 DIZZINESS: ICD-10-CM

## 2018-10-23 PROCEDURE — 70450 CT HEAD/BRAIN W/O DYE: CPT | Mod: TC

## 2018-12-05 ENCOUNTER — TELEPHONE (OUTPATIENT)
Dept: INTERNAL MEDICINE | Facility: CLINIC | Age: 63
End: 2018-12-05

## 2018-12-05 NOTE — TELEPHONE ENCOUNTER
Message: We spoke to your pt about diabetes care and noticed your pt is not filled a statin therapy at [facility] pharmacy in the last 180 days. Your pt would like us to reach out on their behalf to determine if it is appropriate to start a statin therapy. Please send in a new prescription if it is appropriate. Thank you, your local [facility] pharmacy.

## 2018-12-11 ENCOUNTER — TELEPHONE (OUTPATIENT)
Dept: INTERNAL MEDICINE | Facility: CLINIC | Age: 63
End: 2018-12-11

## 2018-12-11 NOTE — TELEPHONE ENCOUNTER
Pharmacy message: we spoke to your patient about diabetes care and noticed your patient has not filled a statin therapy at [facility] pharmacy in the last 180 days.  Your patient would like us to reach out on their behalf to determine if it is appropriate to start a statin therapy. Please send a new prescription for statin therapy if it is appropriate.

## 2018-12-11 NOTE — TELEPHONE ENCOUNTER
Patient has appointment 1/11/19 with Dr. Pickering for physical. To provider to advise.    Mariam MERRITTN, RN, CPN

## 2018-12-17 ENCOUNTER — TELEPHONE (OUTPATIENT)
Dept: FAMILY MEDICINE | Facility: CLINIC | Age: 63
End: 2018-12-17

## 2018-12-17 NOTE — TELEPHONE ENCOUNTER
Letter from [facility] Lazaro Serna [phone] Fax [phone]  We spoke to your patient about diabetes care and noticed your patient has not filled a statin therapy at [facility] pharmacy in the last 180 days.     Your pateint would like us to reach out on their behalf to determine if it is  Appropriate to staart a statin therapy. Please send a new prescription for statin therapy if it is appropriate.    Thank you in augusto for taking the time to review this informatin.    Sincerely,  Your local [facility] Pharmacist     Pinky Carnes, PHILLY

## 2018-12-17 NOTE — TELEPHONE ENCOUNTER
Patient has physical scheduled with Dr Pickering 1/8/19.  Routing to provider as FYI.    Baylee Perry, RENÉN RN

## 2019-01-08 ENCOUNTER — OFFICE VISIT (OUTPATIENT)
Dept: FAMILY MEDICINE | Facility: CLINIC | Age: 64
End: 2019-01-08
Payer: COMMERCIAL

## 2019-01-08 VITALS
HEIGHT: 62 IN | HEART RATE: 61 BPM | TEMPERATURE: 97.2 F | BODY MASS INDEX: 38.83 KG/M2 | DIASTOLIC BLOOD PRESSURE: 90 MMHG | WEIGHT: 211 LBS | RESPIRATION RATE: 16 BRPM | OXYGEN SATURATION: 96 % | SYSTOLIC BLOOD PRESSURE: 148 MMHG

## 2019-01-08 DIAGNOSIS — Z12.31 ENCOUNTER FOR SCREENING MAMMOGRAM FOR BREAST CANCER: ICD-10-CM

## 2019-01-08 DIAGNOSIS — E66.01 MORBID OBESITY (H): ICD-10-CM

## 2019-01-08 DIAGNOSIS — Z22.7 LATENT TUBERCULOSIS BY BLOOD TEST: ICD-10-CM

## 2019-01-08 DIAGNOSIS — Z23 NEED FOR PROPHYLACTIC VACCINATION AND INOCULATION AGAINST INFLUENZA: ICD-10-CM

## 2019-01-08 DIAGNOSIS — R73.03 PREDIABETES: ICD-10-CM

## 2019-01-08 DIAGNOSIS — Z00.00 ROUTINE HISTORY AND PHYSICAL EXAMINATION OF ADULT: Primary | ICD-10-CM

## 2019-01-08 DIAGNOSIS — I10 HYPERTENSION GOAL BP (BLOOD PRESSURE) < 140/90: ICD-10-CM

## 2019-01-08 LAB
ALBUMIN SERPL-MCNC: 3.5 G/DL (ref 3.4–5)
ALP SERPL-CCNC: 69 U/L (ref 40–150)
ALT SERPL W P-5'-P-CCNC: 19 U/L (ref 0–50)
ANION GAP SERPL CALCULATED.3IONS-SCNC: 8 MMOL/L (ref 3–14)
AST SERPL W P-5'-P-CCNC: 18 U/L (ref 0–45)
BILIRUB SERPL-MCNC: 0.5 MG/DL (ref 0.2–1.3)
BUN SERPL-MCNC: 10 MG/DL (ref 7–30)
CALCIUM SERPL-MCNC: 9.5 MG/DL (ref 8.5–10.1)
CHLORIDE SERPL-SCNC: 105 MMOL/L (ref 94–109)
CO2 SERPL-SCNC: 27 MMOL/L (ref 20–32)
CREAT SERPL-MCNC: 0.68 MG/DL (ref 0.52–1.04)
GFR SERPL CREATININE-BSD FRML MDRD: >90 ML/MIN/{1.73_M2}
GLUCOSE SERPL-MCNC: 82 MG/DL (ref 70–99)
HBA1C MFR BLD: 6.1 % (ref 0–5.6)
POTASSIUM SERPL-SCNC: 4.4 MMOL/L (ref 3.4–5.3)
PROT SERPL-MCNC: 7.7 G/DL (ref 6.8–8.8)
SODIUM SERPL-SCNC: 140 MMOL/L (ref 133–144)

## 2019-01-08 PROCEDURE — 83036 HEMOGLOBIN GLYCOSYLATED A1C: CPT | Performed by: FAMILY MEDICINE

## 2019-01-08 PROCEDURE — 99396 PREV VISIT EST AGE 40-64: CPT | Mod: 25 | Performed by: FAMILY MEDICINE

## 2019-01-08 PROCEDURE — 90686 IIV4 VACC NO PRSV 0.5 ML IM: CPT | Performed by: FAMILY MEDICINE

## 2019-01-08 PROCEDURE — 80053 COMPREHEN METABOLIC PANEL: CPT | Performed by: FAMILY MEDICINE

## 2019-01-08 PROCEDURE — 36415 COLL VENOUS BLD VENIPUNCTURE: CPT | Performed by: FAMILY MEDICINE

## 2019-01-08 PROCEDURE — 90471 IMMUNIZATION ADMIN: CPT | Performed by: FAMILY MEDICINE

## 2019-01-08 PROCEDURE — 99213 OFFICE O/P EST LOW 20 MIN: CPT | Mod: 25 | Performed by: FAMILY MEDICINE

## 2019-01-08 RX ORDER — DILTIAZEM HYDROCHLORIDE 180 MG/1
180 CAPSULE, COATED, EXTENDED RELEASE ORAL DAILY
Qty: 90 CAPSULE | Refills: 1 | Status: SHIPPED | OUTPATIENT
Start: 2019-01-08 | End: 2019-04-15

## 2019-01-08 RX ORDER — LISINOPRIL 10 MG/1
10 TABLET ORAL DAILY
Qty: 90 TABLET | Refills: 1 | Status: SHIPPED | OUTPATIENT
Start: 2019-01-08 | End: 2019-04-15

## 2019-01-08 ASSESSMENT — MIFFLIN-ST. JEOR: SCORE: 1452.4

## 2019-01-08 NOTE — LETTER
January 9, 2019    Melissa Coronado  [address]  [place] [address]          Dear Melissa,    Your electrolytes, liver and kidney function are all normal  Your blood sugars are still in the prediabetic range  Follow-up in 6 months.    If you have any questions or concerns, please call myself or my nurse at [phone].    Sincerely,    Yodit Moralez MD/amada    Results for orders placed or performed in visit on 01/08/19   Hemoglobin A1c   Result Value Ref Range    Hemoglobin A1C 6.1 (H) 0 - 5.6 %   Comprehensive metabolic panel   Result Value Ref Range    Sodium 140 133 - 144 mmol/L    Potassium 4.4 3.4 - 5.3 mmol/L    Chloride 105 94 - 109 mmol/L    Carbon Dioxide 27 20 - 32 mmol/L    Anion Gap 8 3 - 14 mmol/L    Glucose 82 70 - 99 mg/dL    Urea Nitrogen 10 7 - 30 mg/dL    Creatinine 0.68 0.52 - 1.04 mg/dL    GFR Estimate >90 >60 mL/min/[1.73_m2]    GFR Estimate If Black >90 >60 mL/min/[1.73_m2]    Calcium 9.5 8.5 - 10.1 mg/dL    Bilirubin Total 0.5 0.2 - 1.3 mg/dL    Albumin 3.5 3.4 - 5.0 g/dL    Protein Total 7.7 6.8 - 8.8 g/dL    Alkaline Phosphatase 69 40 - 150 U/L    ALT 19 0 - 50 U/L    AST 18 0 - 45 U/L

## 2019-01-08 NOTE — PROGRESS NOTES
Yodit Moralez MD  [facility]  Injectable Influenza Immunization Documentation    1.  Is the person to be vaccinated sick today?   No    2. Does the person to be vaccinated have an allergy to a component   of the vaccine?   No  Egg Allergy Algorithm Link    3. Has the person to be vaccinated ever had a serious reaction   to influenza vaccine in the past?   No    4. Has the person to be vaccinated ever had Guillain-Barré syndrome?   No    Form completed by Monika Beach MA

## 2019-01-08 NOTE — PROGRESS NOTES
SUBJECTIVE:   CC: Melissa Coronado is an 63 year old woman who presents for preventive health visit.     Healthy Habits:    Do you get at least three servings of calcium containing foods daily (dairy, green leafy vegetables, etc.)? No    Amount of exercise or daily activities, outside of work: Walks when she can    Problems taking medications regularly No    Medication side effects: No    Have you had an eye exam in the past two years? yes    Do you see a dentist twice per year? yes    Do you have sleep apnea, excessive snoring or daytime drowsiness?no      Pt needs her meds refilled today as well if possible    PT with HTN on diltiazem. States BP readings at home are also high around 150s systolic  Will add lisinopril 10 mg. Pt to follow-up in few weeks to a month to recheck    Pt with prediabetes due for an A1c    Pt on isoniazide for latent TB. Overdue for liver enzymes so will check today    Today's PHQ-2 Score:   PHQ-2 ( 1999 Pfizer) 4/13/2017   Q1: Little interest or pleasure in doing things 0   Q2: Feeling down, depressed or hopeless 0   PHQ-2 Score 0       Abuse: Current or Past(Physical, Sexual or Emotional)- No  Do you feel safe in your environment? Yes    Social History     Tobacco Use     Smoking status: Never Smoker     Smokeless tobacco: Never Used   Substance Use Topics     Alcohol use: Yes     Comment: small amt     If you drink alcohol do you typically have >3 drinks per day or >7 drinks per week? No                     Reviewed orders with patient.  Reviewed health maintenance and updated orders accordingly - Yes  Labs reviewed in [facility]    Mammogram Screening: Patient over age 50, mutual decision to screen reflected in health maintenance.    Pertinent mammograms are reviewed under the imaging tab.  History of abnormal Pap smear: NO - age 30-65 PAP every 5 years with negative HPV co-testing recommended  PAP / HPV Latest Ref Rng & Units 3/22/2017   PAP - NIL   HPV 16 DNA NEG Negative   HPV 18 DNA NEG  "Negative   OTHER HR HPV NEG Negative     Reviewed and updated as needed this visit by clinical staff  Tobacco  Allergies  Meds         Reviewed and updated as needed this visit by Provider            ROS:  CONSTITUTIONAL: NEGATIVE for fever, chills, change in weight  INTEGUMENTARY/SKIN: NEGATIVE for worrisome rashes, moles or lesions  EYES: NEGATIVE for vision changes or irritation  ENT: NEGATIVE for ear, mouth and throat problems  RESP: NEGATIVE for significant cough or SOB  BREAST: NEGATIVE for masses, tenderness or discharge  CV: NEGATIVE for chest pain, palpitations or peripheral edema  GI: NEGATIVE for nausea, abdominal pain, heartburn, or change in bowel habits  : NEGATIVE for unusual urinary or vaginal symptoms. No vaginal bleeding.  MUSCULOSKELETAL: NEGATIVE for significant arthralgias or myalgia  NEURO: NEGATIVE for weakness, dizziness or paresthesias  PSYCHIATRIC: NEGATIVE for changes in mood or affect     OBJECTIVE:   Pulse 61   Temp 97.2  F (36.2  C) (Oral)   Resp 16   Ht 1.562 m (5' 1.5\")   Wt 95.7 kg (211 lb)   SpO2 96%   BMI 39.22 kg/m    EXAM:  GENERAL: healthy, alert and no distress  EYES: Eyes grossly normal to inspection, PERRL and conjunctivae and sclerae normal  HENT: ear canals and TM's normal, nose and mouth without ulcers or lesions  NECK: no adenopathy, no asymmetry, masses, or scars and thyroid normal to palpation  RESP: lungs clear to auscultation - no rales, rhonchi or wheezes  BREAST: normal without masses, tenderness or nipple discharge and no palpable axillary masses or adenopathy  CV: regular rate and rhythm, normal S1 S2, no S3 or S4, no murmur, click or rub, no peripheral edema and peripheral pulses strong  ABDOMEN: soft, nontender, no hepatosplenomegaly, no masses and bowel sounds normal  MS: no gross musculoskeletal defects noted, no edema  SKIN: no suspicious lesions or rashes  NEURO: Normal strength and tone, mentation intact and speech normal  PSYCH: mentation " "appears normal, affect normal/bright        ASSESSMENT/PLAN:   1. Routine history and physical examination of adult      2. Morbid obesity (H)  Encourage regular exercise and healthy diet    3. Hypertension goal BP (blood pressure) < 140/90  Not at goal, add lisinopril 10 mg daily. Follow-up in one month  - diltiazem ER COATED BEADS (CARDIZEM CD/CARTIA XT) 180 MG 24 hr capsule; Take 1 capsule (180 mg) by mouth daily  Dispense: 90 capsule; Refill: 1  - Comprehensive metabolic panel  - lisinopril (PRINIVIL/ZESTRIL) 10 MG tablet; Take 1 tablet (10 mg) by mouth daily  Dispense: 90 tablet; Refill: 1    4. Prediabetes  Due for recheck  - metFORMIN (GLUCOPHAGE) 500 MG tablet; Take 1 tablet (500 mg) by mouth daily (with breakfast)  Dispense: 90 tablet; Refill: 1  - Hemoglobin A1c  - Comprehensive metabolic panel    5. Latent tuberculosis by blood test  Due to have liver enzymes checked. She is tolerating the medication  - Comprehensive metabolic panel    6. Encounter for screening mammogram for breast cancer  Pt scheduled  - MA Screening Digital Bilateral; Future    7. Need for prophylactic vaccination and inoculation against influenza  given  - FLU VACCINE, SPLIT VIRUS, IM (QUADRIVALENT) [41423]- >3 YRS  - Vaccine Administration, Initial [48819]    COUNSELING:   Reviewed preventive health counseling, as reflected in patient instructions       Regular exercise       Healthy diet/nutrition       Vision screening    BP Readings from Last 1 Encounters:   10/12/18 157/78     Estimated body mass index is 39.22 kg/m  as calculated from the following:    Height as of this encounter: 1.562 m (5' 1.5\").    Weight as of this encounter: 95.7 kg (211 lb).      Weight management plan: Discussed healthy diet and exercise guidelines     reports that  has never smoked. she has never used smokeless tobacco.      Counseling Resources:  ATP IV Guidelines  Pooled Cohorts Equation Calculator  Breast Cancer Risk Calculator  FRAX Risk " Assessment  ICSI Preventive Guidelines  Dietary Guidelines for Americans, 2010  USDA's MyPlate  ASA Prophylaxis  Lung CA Screening    Yodit Moralez MD  [facility]

## 2019-01-17 ENCOUNTER — ANCILLARY PROCEDURE (OUTPATIENT)
Dept: MAMMOGRAPHY | Facility: CLINIC | Age: 64
End: 2019-01-17
Payer: COMMERCIAL

## 2019-01-17 ENCOUNTER — ALLIED HEALTH/NURSE VISIT (OUTPATIENT)
Dept: NURSING | Facility: CLINIC | Age: 64
End: 2019-01-17
Payer: COMMERCIAL

## 2019-01-17 VITALS — DIASTOLIC BLOOD PRESSURE: 78 MMHG | SYSTOLIC BLOOD PRESSURE: 152 MMHG | HEART RATE: 52 BPM | OXYGEN SATURATION: 97 %

## 2019-01-17 DIAGNOSIS — I10 HYPERTENSION GOAL BP (BLOOD PRESSURE) < 140/90: Primary | ICD-10-CM

## 2019-01-17 DIAGNOSIS — Z12.31 ENCOUNTER FOR SCREENING MAMMOGRAM FOR BREAST CANCER: ICD-10-CM

## 2019-01-17 PROCEDURE — 77067 SCR MAMMO BI INCL CAD: CPT | Mod: TC

## 2019-01-17 PROCEDURE — 99207 ZZC NO CHARGE NURSE ONLY: CPT

## 2019-01-17 NOTE — PROGRESS NOTES
Patient presented to clinic today for follow blood pressure check after adding on lisinopril 10 mg from last OV.  Blood pressure was still increased after manual recheck of the blood pressure.  In review of medication with patient it comes to light that she has not started the lisinopril at all.  I called the pharmacy and they stated the medication is still ready to .  I spoke with Dr. Moralez and we are simply going to instruct the patient to  the medication and start it and come back in 2 weeks for an ancillary blood pressure check.      Patient assisted in making an appointment with ancillary 1/31/19 and was given AVS with instructions.  Adriana Lauren R.N.      To provider for cosign.  Thank you.  Adriana Moralez MD

## 2019-01-17 NOTE — PATIENT INSTRUCTIONS
Please start taking LISINOPRIL 10 MG 1 TABLET DAILY and then see ancillary nurse in 2 weeks for blood pressure follow up. Thank you. Adriana MAN R.N.

## 2019-01-17 NOTE — PROGRESS NOTES
Melissa Coronado is a 63 year old patient who comes in today for a Blood Pressure check.  Initial BP:  /69 (BP Location: Left arm, Patient Position: Sitting)      Data Unavailable  Disposition: BP elevated.  Triage RN notified, patient asked to wait    Farzaneh Marquez MA      BP Readings from Last 3 Encounters:   01/17/19 168/69   01/08/19 148/90   10/12/18 157/78     Farzaneh Marquez MA

## 2019-04-15 ENCOUNTER — OFFICE VISIT (OUTPATIENT)
Dept: FAMILY MEDICINE | Facility: CLINIC | Age: 64
End: 2019-04-15
Payer: MEDICAID

## 2019-04-15 VITALS
RESPIRATION RATE: 20 BRPM | OXYGEN SATURATION: 97 % | WEIGHT: 208 LBS | DIASTOLIC BLOOD PRESSURE: 82 MMHG | BODY MASS INDEX: 38.66 KG/M2 | TEMPERATURE: 98.8 F | HEART RATE: 67 BPM | SYSTOLIC BLOOD PRESSURE: 148 MMHG

## 2019-04-15 DIAGNOSIS — Z78.0 ASYMPTOMATIC POSTMENOPAUSAL STATUS: ICD-10-CM

## 2019-04-15 DIAGNOSIS — M54.50 CHRONIC LEFT-SIDED LOW BACK PAIN WITHOUT SCIATICA: ICD-10-CM

## 2019-04-15 DIAGNOSIS — Z12.11 SCREEN FOR COLON CANCER: ICD-10-CM

## 2019-04-15 DIAGNOSIS — R73.03 PREDIABETES: ICD-10-CM

## 2019-04-15 DIAGNOSIS — M54.2 NECK PAIN: Primary | ICD-10-CM

## 2019-04-15 DIAGNOSIS — I10 HYPERTENSION GOAL BP (BLOOD PRESSURE) < 140/90: ICD-10-CM

## 2019-04-15 DIAGNOSIS — G89.29 CHRONIC LEFT-SIDED LOW BACK PAIN WITHOUT SCIATICA: ICD-10-CM

## 2019-04-15 PROCEDURE — 99214 OFFICE O/P EST MOD 30 MIN: CPT | Performed by: FAMILY MEDICINE

## 2019-04-15 RX ORDER — DILTIAZEM HYDROCHLORIDE 180 MG/1
180 CAPSULE, COATED, EXTENDED RELEASE ORAL DAILY
Qty: 90 CAPSULE | Refills: 1 | Status: SHIPPED | OUTPATIENT
Start: 2019-04-15 | End: 2019-06-05

## 2019-04-15 RX ORDER — LISINOPRIL 10 MG/1
10 TABLET ORAL DAILY
Qty: 90 TABLET | Refills: 1 | Status: SHIPPED | OUTPATIENT
Start: 2019-04-15 | End: 2019-06-05

## 2019-04-15 ASSESSMENT — PAIN SCALES - GENERAL: PAINLEVEL: NO PAIN (0)

## 2019-04-15 NOTE — PATIENT INSTRUCTIONS
Please schedule a 30 minute(s) appointment(s) for a recheck on blood pressure , neck pain and back pain for 3-4 weeks from now.

## 2019-04-15 NOTE — NURSING NOTE
"Chief Complaint   Patient presents with     Generalized Body Aches     Health Maintenance     order pended, fall risk, FAHEEM, pneumo       Initial /72   Pulse 67   Temp 98.8  F (37.1  C) (Oral)   Resp 20   Wt 94.3 kg (208 lb)   SpO2 97%   BMI 38.66 kg/m   Estimated body mass index is 38.66 kg/m  as calculated from the following:    Height as of 1/8/19: 1.562 m (5' 1.5\").    Weight as of this encounter: 94.3 kg (208 lb).  Medication Reconciliation: complete  Lesa Michael CMA  "

## 2019-04-15 NOTE — PROGRESS NOTES
SUBJECTIVE:  Melissa Coronado is an 64 year old female who presents for a follow up evaluation of her hypertension.The patient was kept on the same medications at the last visit. The patient reports that she IS NOT taking the medication as prescribed. She denies side effects of medication.      She last took the medication yesterday as she ran out of medication,         Patient Active Problem List   Diagnosis     Hypertension goal BP (blood pressure) < 140/90     Morbid obesity (H)     Latent tuberculosis by blood test     Prediabetes       Is the HYPERTENSION goal on the problem list? Yes      Use of agents associated with hypertension: none  Current Outpatient Medications   Medication     Acetaminophen (TYLENOL EXTRA STRENGTH PO)     diltiazem ER COATED BEADS (CARDIZEM CD/CARTIA XT) 180 MG 24 hr capsule     isoniazid (NYDRAZID) 300 MG tablet     lisinopril (PRINIVIL/ZESTRIL) 10 MG tablet     metFORMIN (GLUCOPHAGE) 500 MG tablet     pyridOXINE (VITAMIN B-6) 50 MG tablet     No current facility-administered medications for this visit.          Allergies   Allergen Reactions     Sodium        Social History     Tobacco Use     Smoking status: Never Smoker     Smokeless tobacco: Never Used   Substance Use Topics     Alcohol use: Yes     Comment: small amt       OBJECTIVE:  /82   Pulse 67   Temp 98.8  F (37.1  C) (Oral)   Resp 20   Wt 94.3 kg (208 lb)   SpO2 97%   BMI 38.66 kg/m      Heart: negative, PMI normal. No lifts, heaves, or thrills. RRR. No murmurs, clicks gallops or rub  Lower Extremities:No edema on right and No  edema on the left        Results for orders placed or performed in visit on 01/17/19   MA Screening Digital Bilateral    Narrative    SCREENING MAMMOGRAM, BILATERAL, DIGITAL w/CAD - 1/17/2019 10:31 AM.    BREAST SYMPTOMS: No current breast complaints.     COMPARISON:  04/13/2017,  05/11/2016.    BREAST DENSITY: Heterogeneously dense.    COMMENTS: No findings of suspicion for malignancy.        Impression    IMPRESSION: BI-RADS CATEGORY: 1 - Negative.    RECOMMENDED FOLLOW-UP: Annual Mammography.  Recommend routine annual screening mammography.    Exam results letter mailed to patient.                SANTA BIANCHI MD       The 10-year ASCVD risk score (Auburnsaman LEE JrBeto, et al., 2013) is: 13.6%    Values used to calculate the score:      Age: 64 years      Sex: Female      Is Non- : Yes      Diabetic: No      Tobacco smoker: No      Systolic Blood Pressure: 148 mmHg      Is BP treated: Yes      HDL Cholesterol: 75 mg/dL      Total Cholesterol: 232 mg/dL    ASSESSMENT:  Essential hypertension which is poorly controlled likely because she has not taken her medication.       Plan:  - Medication: restart the current doses of medication.    The patient was advised to do the following therapuetic life style changes  - Dietary sodium restriction and increase potassium and Calcium intake  - Regular aerobic exercise  - Weight loss  - Discontinue smoking if applicable  - Avoid regular NSAID use if applicable  - Avoid regular decongestant use if applicable  - Follow up in clinic in 3 weeks for a recheck  - Check a basic metabolic panel today    Patient Education: Reviewed risks of hypertension and principles of   Treatment.        --------------------------------------------------------------------------------------------------------------------------------------    SUBJECTIVE:  Melissa Coronado is a 64 year old female who is seen for left neck pain that started a few  week(s) ago. The onset of the pain was gradual.  The patient denies any injury.    The patient reports that these symptoms have been waxing and waning since that time.  Palliative factors for the pain include: rest   Provacative factors include:turning her head    The patient reports that the pain radiates into the left shoulder.      The patient relates no prior history of problems with her neck.   The patients past medical, surgical,  social and family histories were reviewed.  Social History     Socioeconomic History     Marital status:      Spouse name: None     Number of children: None     Years of education: None     Highest education level: None   Occupational History     None   Social Needs     Financial resource strain: None     Food insecurity:     Worry: None     Inability: None     Transportation needs:     Medical: None     Non-medical: None   Tobacco Use     Smoking status: Never Smoker     Smokeless tobacco: Never Used   Substance and Sexual Activity     Alcohol use: Yes     Comment: small amt     Drug use: No     Sexual activity: None   Lifestyle     Physical activity:     Days per week: None     Minutes per session: None     Stress: None   Relationships     Social connections:     Talks on phone: None     Gets together: None     Attends Hoahaoism service: None     Active member of club or organization: None     Attends meetings of clubs or organizations: None     Relationship status: None     Intimate partner violence:     Fear of current or ex partner: None     Emotionally abused: None     Physically abused: None     Forced sexual activity: None   Other Topics Concern     Parent/sibling w/ CABG, MI or angioplasty before 65F 55M? Not Asked   Social History Narrative     None     History reviewed. No pertinent past medical history.  Current Outpatient Medications   Medication Sig Dispense Refill     Acetaminophen (TYLENOL EXTRA STRENGTH PO) Take 500 mg by mouth Reported on 4/13/2017       diltiazem ER COATED BEADS (CARDIZEM CD/CARTIA XT) 180 MG 24 hr capsule Take 1 capsule (180 mg) by mouth daily 90 capsule 1     isoniazid (NYDRAZID) 300 MG tablet Take 1 tablet (300 mg) by mouth daily 90 tablet 2     lisinopril (PRINIVIL/ZESTRIL) 10 MG tablet Take 1 tablet (10 mg) by mouth daily 90 tablet 1     metFORMIN (GLUCOPHAGE) 500 MG tablet Take 1 tablet (500 mg) by mouth daily (with breakfast) 90 tablet 1     nabumetone (RELAFEN) 750  MG tablet 1 tablet twice a day for 10 days and then twice a day only as needed 60 tablet 1     pyridOXINE (VITAMIN B-6) 50 MG tablet Take 1 tablet (50 mg) by mouth daily 90 tablet 2       REVIEW OF SYSTEMS:  CONSTITUTIONAL:NEGATIVE for fever, chills, change in weight    MUSCULOSKELETAL:See HPI above  NEURO: He denies any symptoms of numbness or tingling in her arms.      Allergies as of 04/15/2019 - Reviewed 04/15/2019   Allergen Reaction Noted     Sodium  12/22/2017     Current Outpatient Medications   Medication     Acetaminophen (TYLENOL EXTRA STRENGTH PO)     diltiazem ER COATED BEADS (CARDIZEM CD/CARTIA XT) 180 MG 24 hr capsule     isoniazid (NYDRAZID) 300 MG tablet     lisinopril (PRINIVIL/ZESTRIL) 10 MG tablet     metFORMIN (GLUCOPHAGE) 500 MG tablet     nabumetone (RELAFEN) 750 MG tablet     pyridOXINE (VITAMIN B-6) 50 MG tablet     No current facility-administered medications for this visit.          Examination:  /82   Pulse 67   Temp 98.8  F (37.1  C) (Oral)   Resp 20   Wt 94.3 kg (208 lb)   SpO2 97%   BMI 38.66 kg/m    General: healthy, alert and no distress.  PSYCH:  mentation appears normal and affect normal/bright  Strength was +5 globally in the upper extremities.       Musculoskeletal:  Inspection: normal cervical lordosis  There was tenderness to palpation of the left  trapezius muscle.  There was no tenderness to palpation of the insertion of the paraspinal muscles on the right occipital area, upper cervical vertebrae, lower cervical vertebrae, right paraspinal muscles in the upper cervical region, right paraspinal muscles in the lower cervical region and right trapezius muscle  Spurling's manuever was negative bilaterally.  There was decreased active range of motion in neck flexion: Mild, extension: Mild and rotation: Mild.        ASSESSMENT/IMPRESSION:  musculoskeletal neck pain    PLAN:    The patient and I discussed the absolute importance of continuing to do range of motion  exercises and strengthening exercises despite the immediate discomfort that it may cause.We discussed the use of non steroidal anti-inflammatory drugs and muscle relaxants to help them in this goal. Heat therapy in the morning to improve range of motion was emphasized. The patient was advised only to use ice at the end of the day for pain relief if needed.  The patient was referred to physical therapy for evaluation and treatment.  We will start the patient on relafen .     Follow up in 1 month. if the symptom(s) are not improving.

## 2019-05-01 ENCOUNTER — TELEPHONE (OUTPATIENT)
Dept: FAMILY MEDICINE | Facility: CLINIC | Age: 64
End: 2019-05-01

## 2019-05-01 NOTE — TELEPHONE ENCOUNTER
Panel Management Review      Patient has the following on her problem list:     Hypertension   Last three blood pressure readings:  BP Readings from Last 3 Encounters:   04/15/19 148/82   01/17/19 152/78   01/08/19 148/90     Blood pressure: failed    HTN Guidelines:  Less than 140/90      Composite cancer screening  Chart review shows that this patient is due/due soon for the following Fecal Colorectal (FIT)  Summary:    Patient is due/failing the following:   BP CHECK and FIT    Action needed:   Patient needs office visit for blood pressure check.    Type of outreach:    Sent letter.    Questions for provider review:    None                                                                                                                    Chart routed to Care Team .

## 2019-05-01 NOTE — LETTER
[facility]  [address] [facility] [address]  Phone: [phone]    05/01/19    Melissa Coronado  [address] [facility] [address]      To whom it may concern:     Dear Melissa    Your blood pressure has been elevated the last few times you have been in the clinic. You are also due for a colon cancer screening test. Please make an appointment to discuss these issues     Sincerely,      Yodit Moralez MD/ct

## 2019-05-09 ENCOUNTER — TELEPHONE (OUTPATIENT)
Dept: FAMILY MEDICINE | Facility: CLINIC | Age: 64
End: 2019-05-09

## 2019-05-09 NOTE — TELEPHONE ENCOUNTER
Panel Management Review      Patient has the following on her problem list:     Hypertension   Last three blood pressure readings:  BP Readings from Last 3 Encounters:   04/15/19 148/82   01/17/19 152/78   01/08/19 148/90     Blood pressure: FAILED    HTN Guidelines:  Less than 140/90      Composite cancer screening  Chart review shows that this patient is due/due soon for the following None  Summary:    Patient is due/failing the following:   BP CHECK and LDL    Action needed:   Patient needs office visit for bp check.    Type of outreach:    Sent letter.    Questions for provider review:    None                                                                                                                                    Monika Beach MA       Chart routed to Care Team .

## 2019-05-09 NOTE — LETTER
[facility]  [address] [facility] [address]  Phone: [phone]    05/09/19    Melissa Coronado  [address] [facility] [address]      To whom it may concern:     Your blood pressure has been elevated the last few times while in the clinic. Please make an appointment with the ancillary nurse to have it monitored. This will help us better manage your hypertension.    Sincerely,      Yodit Moralez MD/ct

## 2019-05-13 ENCOUNTER — DOCUMENTATION ONLY (OUTPATIENT)
Dept: FAMILY MEDICINE | Facility: CLINIC | Age: 64
End: 2019-05-13

## 2019-05-13 DIAGNOSIS — I10 HYPERTENSION GOAL BP (BLOOD PRESSURE) < 140/90: Primary | ICD-10-CM

## 2019-05-13 DIAGNOSIS — R73.03 PREDIABETES: ICD-10-CM

## 2019-05-13 NOTE — PROGRESS NOTES
This patient has a lab only appointment on 5/17/2019 but does not have future orders. Please review, associate diagnosis and sign pending lab orders for the upcoming appointment. She has an appointment with Dr. Engel on 5/20/2019      Thank you,    [facility]

## 2019-06-05 ENCOUNTER — OFFICE VISIT (OUTPATIENT)
Dept: FAMILY MEDICINE | Facility: CLINIC | Age: 64
End: 2019-06-05
Payer: MEDICAID

## 2019-06-05 VITALS
TEMPERATURE: 98.4 F | BODY MASS INDEX: 38.51 KG/M2 | DIASTOLIC BLOOD PRESSURE: 74 MMHG | HEART RATE: 67 BPM | WEIGHT: 204 LBS | OXYGEN SATURATION: 95 % | HEIGHT: 61 IN | RESPIRATION RATE: 20 BRPM | SYSTOLIC BLOOD PRESSURE: 118 MMHG

## 2019-06-05 DIAGNOSIS — I10 HYPERTENSION GOAL BP (BLOOD PRESSURE) < 140/90: ICD-10-CM

## 2019-06-05 LAB
ALBUMIN SERPL-MCNC: 3.6 G/DL (ref 3.4–5)
ALP SERPL-CCNC: 71 U/L (ref 40–150)
ALT SERPL W P-5'-P-CCNC: 16 U/L (ref 0–50)
ANION GAP SERPL CALCULATED.3IONS-SCNC: 4 MMOL/L (ref 3–14)
AST SERPL W P-5'-P-CCNC: 12 U/L (ref 0–45)
BILIRUB SERPL-MCNC: 0.5 MG/DL (ref 0.2–1.3)
BUN SERPL-MCNC: 12 MG/DL (ref 7–30)
CALCIUM SERPL-MCNC: 9.4 MG/DL (ref 8.5–10.1)
CHLORIDE SERPL-SCNC: 105 MMOL/L (ref 94–109)
CO2 SERPL-SCNC: 31 MMOL/L (ref 20–32)
CREAT SERPL-MCNC: 0.66 MG/DL (ref 0.52–1.04)
GFR SERPL CREATININE-BSD FRML MDRD: >90 ML/MIN/{1.73_M2}
GLUCOSE SERPL-MCNC: 96 MG/DL (ref 70–99)
POTASSIUM SERPL-SCNC: 4 MMOL/L (ref 3.4–5.3)
PROT SERPL-MCNC: 7.9 G/DL (ref 6.8–8.8)
SODIUM SERPL-SCNC: 140 MMOL/L (ref 133–144)

## 2019-06-05 PROCEDURE — 99213 OFFICE O/P EST LOW 20 MIN: CPT | Performed by: FAMILY MEDICINE

## 2019-06-05 PROCEDURE — 36415 COLL VENOUS BLD VENIPUNCTURE: CPT | Performed by: FAMILY MEDICINE

## 2019-06-05 PROCEDURE — 80053 COMPREHEN METABOLIC PANEL: CPT | Performed by: FAMILY MEDICINE

## 2019-06-05 RX ORDER — DILTIAZEM HYDROCHLORIDE 180 MG/1
180 CAPSULE, COATED, EXTENDED RELEASE ORAL DAILY
Qty: 90 CAPSULE | Refills: 1 | Status: SHIPPED | OUTPATIENT
Start: 2019-06-05 | End: 2020-02-21

## 2019-06-05 RX ORDER — LISINOPRIL 10 MG/1
10 TABLET ORAL DAILY
Qty: 90 TABLET | Refills: 1 | Status: SHIPPED | OUTPATIENT
Start: 2019-06-05 | End: 2020-02-21

## 2019-06-05 ASSESSMENT — MIFFLIN-ST. JEOR: SCORE: 1399.78

## 2019-06-05 NOTE — NURSING NOTE
"Chief Complaint   Patient presents with     Hypertension     Health Maintenance     order pended, phq2, fall risk       Initial /80   Pulse 67   Temp 98.4  F (36.9  C) (Oral)   Resp 20   Ht 1.537 m (5' 0.5\")   Wt 92.5 kg (204 lb)   SpO2 95%   BMI 39.19 kg/m   Estimated body mass index is 39.19 kg/m  as calculated from the following:    Height as of this encounter: 1.537 m (5' 0.5\").    Weight as of this encounter: 92.5 kg (204 lb).    Pinky Carnes, PHILLY    "

## 2019-06-05 NOTE — PROGRESS NOTES
"  SUBJECTIVE:  Melissa Coronado is an 64 year old female who presents for a follow up evaluation of her hypertension.The patient was kept on the same medications at the last visit. She was not taking them at the time of her last visit.   The patient reports that she IS taking the medication as prescribed. She denies side effects of medication.      Patient Active Problem List   Diagnosis     Hypertension goal BP (blood pressure) < 140/90     Morbid obesity (H)     Latent tuberculosis by blood test     Prediabetes       Is the HYPERTENSION goal on the problem list? Yes      Use of agents associated with hypertension: none  Current Outpatient Medications   Medication     Acetaminophen (TYLENOL EXTRA STRENGTH PO)     diltiazem ER COATED BEADS (CARDIZEM CD/CARTIA XT) 180 MG 24 hr capsule     isoniazid (NYDRAZID) 300 MG tablet     lisinopril (PRINIVIL/ZESTRIL) 10 MG tablet     metFORMIN (GLUCOPHAGE) 500 MG tablet     nabumetone (RELAFEN) 750 MG tablet     pyridOXINE (VITAMIN B-6) 50 MG tablet     No current facility-administered medications for this visit.          Allergies   Allergen Reactions     Sodium        Social History     Tobacco Use     Smoking status: Never Smoker     Smokeless tobacco: Never Used   Substance Use Topics     Alcohol use: Yes     Comment: small amt       OBJECTIVE:  /80   Pulse 67   Temp 98.4  F (36.9  C) (Oral)   Resp 20   Ht 1.537 m (5' 0.5\")   Wt 92.5 kg (204 lb)   SpO2 95%   BMI 39.19 kg/m      Heart: negative, PMI normal. No lifts, heaves, or thrills. RRR. No murmurs, clicks gallops or rub  Lower Extremities:No edema on right and No  edema on the left        Results for orders placed or performed in visit on 01/17/19   MA Screening Digital Bilateral    Narrative    SCREENING MAMMOGRAM, BILATERAL, DIGITAL w/CAD - 1/17/2019 10:31 AM.    BREAST SYMPTOMS: No current breast complaints.     COMPARISON:  04/13/2017,  05/11/2016.    BREAST DENSITY: Heterogeneously dense.    COMMENTS: No " findings of suspicion for malignancy.       Impression    IMPRESSION: BI-RADS CATEGORY: 1 - Negative.    RECOMMENDED FOLLOW-UP: Annual Mammography.  Recommend routine annual screening mammography.    Exam results letter mailed to patient.                SANTA BIANCHI MD       The 10-year ASCVD risk score (Sherry LEE JrBeto, et al., 2013) is: 14%    Values used to calculate the score:      Age: 64 years      Sex: Female      Is Non- : Yes      Diabetic: No      Tobacco smoker: No      Systolic Blood Pressure: 150 mmHg      Is BP treated: Yes      HDL Cholesterol: 75 mg/dL      Total Cholesterol: 232 mg/dL    ASSESSMENT:  Essential hypertension which is very well controlled.       Plan:  - Medication:continue the current doses of medication.  The patients antihypertensive medication was filled for 12 months.    The patient was advised to do the following therapuetic life style changes  - Dietary sodium restriction and increase potassium and Calcium intake  - Regular aerobic exercise  - Weight loss  - Discontinue smoking if applicable  - Avoid regular NSAID use if applicable  - Avoid regular decongestant use if applicable  - Follow up in clinic in 12 months for a recheck  - Check a basic metabolic panel today    Patient Education: Reviewed risks of hypertension and principles of   Treatment.    CMP checked today as she need liver function tests for her isoniazid monitoring. She was started on this in January and needs to take it for 9 month(s)     Care coordination referral. The patient would like ot apply to [employer] for assistance with transportation.

## 2019-06-05 NOTE — LETTER
June 6, 2019    Melissa Coronado  [address]  [place] [address]            Melissa,    I have reviewed the results of the laboratory tests that we recently ordered. All of the lab work performed was normal or considered normal for you.    If you have any questions or concerns, please call myself or my nurse at [phone].    Sincerely,    Juanjo nEgel MD/[place]ada    Results for orders placed or performed in visit on 06/05/19   Comprehensive metabolic panel   Result Value Ref Range    Sodium 140 133 - 144 mmol/L    Potassium 4.0 3.4 - 5.3 mmol/L    Chloride 105 94 - 109 mmol/L    Carbon Dioxide 31 20 - 32 mmol/L    Anion Gap 4 3 - 14 mmol/L    Glucose 96 70 - 99 mg/dL    Urea Nitrogen 12 7 - 30 mg/dL    Creatinine 0.66 0.52 - 1.04 mg/dL    GFR Estimate >90 >60 mL/min/[1.73_m2]    GFR Estimate If Black >90 >60 mL/min/[1.73_m2]    Calcium 9.4 8.5 - 10.1 mg/dL    Bilirubin Total 0.5 0.2 - 1.3 mg/dL    Albumin 3.6 3.4 - 5.0 g/dL    Protein Total 7.9 6.8 - 8.8 g/dL    Alkaline Phosphatase 71 40 - 150 U/L    ALT 16 0 - 50 U/L    AST 12 0 - 45 U/L

## 2019-06-06 NOTE — RESULT ENCOUNTER NOTE
Melissa,  I have reviewed the results of the laboratory tests that we recently ordered. All of the lab work performed was normal or considered normal for you.  Sincerely,   Juanjo Engel

## 2019-06-07 ENCOUNTER — PATIENT OUTREACH (OUTPATIENT)
Dept: CARE COORDINATION | Facility: CLINIC | Age: 64
End: 2019-06-07

## 2019-06-07 NOTE — LETTER
Health Care Home - Access Care Plan    About Me:    Patient Name:  Melissa Coronado    YOB: 1955  Age:                      64 year old   [place] MRN:     [id number] Telephone Information:   Home Phone [phone]   Mobile [phone]       Address:  [address]   [place] [address] Email address:  No e-mail address on record      Emergency Contact(s)   Name Relationship Lgl Grd Work Phone Home Phone Mobile Phone   CARLOS CADEAN Son   [phone]              Health Maintenance:      My Access Plan  Medical Emergency 911   Questions or concerns during clinic hours Primary Clinic Line, I will call the clinic directly: [facility] - [phone]   24 Hour Appointment Line [phone] or  [phone]) (toll free)   24 Hour Nurse Line [phone] (toll free)   Questions or concerns outside clinic hours 24 Hour Appointment Line, I will call the after-hours on-call line:   [facility] [phone] or [phone]) (toll-free)   Preferred Urgent Care     Preferred Hospital     Preferred Pharmacy [id number] [place], [place] - [address] [facility]. [address]     Behavioral Health Crisis Line The National Suicide Prevention Lifeline at 1-803.946.6489 or 911                     My Care Team Members  Patient Care Team       Relationship Specialty Notifications Start End    No Ref-Primary, Physician PCP - General   10/12/18     Fax: [phone]         Yodit Pickering MD Assigned PCP   4/26/19     Phone: [phone] Fax: [phone] [address] [facility] [address]    [place] LSW Clinic Care Coordinator Primary Care - CC Admissions 6/5/19     Phone: [phone]                My Medical and Care Information  Problem List   Patient Active Problem List   Diagnosis     Hypertension goal BP (blood pressure) < 140/90     Morbid obesity (H)     Latent tuberculosis by blood test     Prediabetes      Current  Medications and Allergies:  See printed Medication Report

## 2019-06-07 NOTE — PROGRESS NOTES
Clinic Care Coordination Contact  Care Team Conversations    Spoke with pt. Introduced self and explained reason for call. Pt requesting to apply for [employer] transportation service. Pt requests CHERY CC mail application to her as she does not have internet access.     Plan: CHERY PHILLIPS will mail application to pt today and will f/u next week to ensure received it.     LYNN Bailon   Primary Care Clinic- Social Work Care Coordinator  [facility]  6/7/2019 11:43 AM  [phone]

## 2019-06-07 NOTE — LETTER
[place] CARE COORDINATION  [address] [facility] [place] [address]    June 7, 2019    Melissa Coronado  [address] [facility] [address]      Dear Melissa,    I am a clinic care coordinator who works with Juanjo Engel MD at [facility]. I wanted to thank you for spending the time to talk with me.  I wanted to introduce myself and provide you with my contact information so that you can call me with questions or concerns about your health care. Below is a description of clinic care coordination and how I can further assist you.     The clinic care coordinator is a registered nurse and/or  who understand the health care system. The goal of clinic care coordination is to help you manage your health and improve access to the [facility] system in the most efficient manner. The registered nurse can assist you in meeting your health care goals by providing education, coordinating services, and strengthening the communication among your providers. The  can assist you with financial, behavioral, psychosocial, chemical dependency, counseling, and/or psychiatric resources.    Please feel free to contact me at [phone], with any questions or concerns. We at [facility] are focused on providing you with the highest-quality healthcare experience possible and that all starts with you.     Sincerely,     LYNN Bailon   Primary Care Clinic- Social Work Care Coordinator  [facility]                                      [phone]      Enclosed: I have enclosed a copy of a 24 Hour Access Plan. This has helpful phone numbers for you to call when needed. Please keep this in an easy to access place to use as needed.

## 2019-06-12 ENCOUNTER — TELEPHONE (OUTPATIENT)
Dept: FAMILY MEDICINE | Facility: CLINIC | Age: 64
End: 2019-06-12

## 2019-06-12 NOTE — TELEPHONE ENCOUNTER
Reason for Call:  Form, our goal is to have forms completed with 72 hours, however, some forms may require a visit or additional information.    Type of letter, form or note:  [employer]    Who is the form from?: Patient    Where did the form come from: Patient or family brought in       What clinic location was the form placed at?: [place]    Where the form was placed: TC/provider Box/Folder    What number is listed as a contact on the form?: [phone]       Additional comments:     Call taken on 6/12/2019 at 3:29 PM by Melissa Núñez

## 2019-06-13 NOTE — TELEPHONE ENCOUNTER
I called the patient again and her voicemail is still full and her MyChart is declined.  Unable to reach the patient, 2 attempts were made.   I brought the completed paperwork up to the  and it is ready for .  Melissa Núñez,

## 2019-06-13 NOTE — PROGRESS NOTES
Clinic Care Coordination Contact  [facility]/Voicemail    Referral Source: PCP  Clinical Data: Care Coordinator Outreach- calling pt to make sure she received the [employer] application in the mail  Outreach attempted x 1. Voicemail box is full, unable to leave message.  Plan: Care Coordinator mailed out care coordination introduction letter on 6/7/19. Care Coordinator will try to reach patient again in 3-5 business days.  CHERY CC notes that pt did bring in the [employer] application. Care Team is attempting to reach pt to let her know it is ready for .       LYNN Bailon   Primary Care Clinic- Social Work Care Coordinator  [facility]  6/13/2019 1:45 PM  [phone]

## 2019-06-18 NOTE — PROGRESS NOTES
Clinic Care Coordination Contact  [facility]/Voicemail    Referral Source: PCP  Clinical Data: Care Coordinator Outreach- pt received [employer] application and had brought to clinic for PCP to complete a portion- this was done and is at  for pt to , unfortunately CHERY CC and clinic staff unable to reach pt to let her know  Outreach attempted x 2.  Left message on voicemail with call back information and requested return call.  Plan: Care Coordinator mailed out care coordination introduction letter on 6/7/19. Care Coordinator will try to reach patient again in 3-5 business days. Spoke with TC in clinic and they will mail it out to patient. CHERY PHILLIPS will attempt to reach pt one more time.       LYNN Bailon   Primary Care Clinic- Social Work Care Coordinator  [place] [place]  6/18/2019 10:21 AM  [phone]

## 2019-07-02 NOTE — PROGRESS NOTES
Clinic Care Coordination Contact  Care Team Conversations    Spoke with pt. Let her know was following up to make sure she got the [employer] application that was mailed back to her from the clinic. Pt states she did get it and is working on finishing her parts of it. Pt will get it sent back to [employer] when gets it all done. Pt asked about where to get an envelope. Let her know could get an envelope if wants a bigger one at post office or places such as [facility] or [facility]. Pt was appreciative of information and SW CC checking in with her. Pt has no other questions or CC needs.      Plan: CHERY CC will do no further outreaches.     LYNN Bailon   Primary Care Clinic- Social Work Care Coordinator  [facility]  7/2/2019 9:54 AM  [phone]

## 2019-07-24 ENCOUNTER — TELEPHONE (OUTPATIENT)
Dept: FAMILY MEDICINE | Facility: CLINIC | Age: 64
End: 2019-07-24

## 2019-07-24 NOTE — TELEPHONE ENCOUNTER
Please sign the appropriate order. If FIT route to lab to mail card to patient. If colonoscopy route back to the team so we can mail patient the referral information.      Lesa Michael, cma

## 2019-08-29 ENCOUNTER — TELEPHONE (OUTPATIENT)
Dept: INTERNAL MEDICINE | Facility: CLINIC | Age: 64
End: 2019-08-29

## 2019-08-29 DIAGNOSIS — Z22.7 LATENT TUBERCULOSIS BY BLOOD TEST: ICD-10-CM

## 2019-08-29 NOTE — TELEPHONE ENCOUNTER
Routing refill request to provider for review/approval because:  Drug not on the [facility] refill protocol   Pinky Pandey BSN, RN

## 2019-08-30 RX ORDER — ISONIAZID 300 MG/1
300 TABLET ORAL DAILY
Qty: 90 TABLET | Refills: 2 | OUTPATIENT
Start: 2019-08-30

## 2019-08-30 NOTE — TELEPHONE ENCOUNTER
Refill request denial message sent to pharmacy.    Unclear if pt requested the refill from pharmacy, or if pharmacy sent automatic request due to no remaining refills on order.  Attempted to reach pt to inquire if she had made this request and notify of provider note below. There was no answer. LM to return call to RN at [phone].    Gauri Hernández, RENÉN, RN

## 2019-08-30 NOTE — TELEPHONE ENCOUNTER
Per the4-30-18 note from Dr Crandall the patient only needed to take the medication for 9 month(s) and she was given that already.   Juanjo Engel MD

## 2020-01-20 ENCOUNTER — ANCILLARY PROCEDURE (OUTPATIENT)
Dept: MAMMOGRAPHY | Facility: CLINIC | Age: 65
End: 2020-01-20
Payer: MEDICAID

## 2020-01-20 DIAGNOSIS — Z12.31 VISIT FOR SCREENING MAMMOGRAM: ICD-10-CM

## 2020-01-20 PROCEDURE — 77067 SCR MAMMO BI INCL CAD: CPT | Mod: TC

## 2020-01-20 PROCEDURE — 77063 BREAST TOMOSYNTHESIS BI: CPT | Mod: TC

## 2020-02-05 ENCOUNTER — OFFICE VISIT (OUTPATIENT)
Dept: FAMILY MEDICINE | Facility: CLINIC | Age: 65
End: 2020-02-05
Payer: MEDICAID

## 2020-02-05 VITALS
TEMPERATURE: 97.6 F | OXYGEN SATURATION: 97 % | BODY MASS INDEX: 38.89 KG/M2 | WEIGHT: 206 LBS | SYSTOLIC BLOOD PRESSURE: 176 MMHG | HEIGHT: 61 IN | RESPIRATION RATE: 18 BRPM | DIASTOLIC BLOOD PRESSURE: 88 MMHG | HEART RATE: 66 BPM

## 2020-02-05 DIAGNOSIS — M54.50 CHRONIC LEFT-SIDED LOW BACK PAIN WITHOUT SCIATICA: Primary | ICD-10-CM

## 2020-02-05 DIAGNOSIS — G89.29 CHRONIC LEFT-SIDED LOW BACK PAIN WITHOUT SCIATICA: Primary | ICD-10-CM

## 2020-02-05 DIAGNOSIS — I10 HYPERTENSION GOAL BP (BLOOD PRESSURE) < 140/90: ICD-10-CM

## 2020-02-05 PROCEDURE — 99213 OFFICE O/P EST LOW 20 MIN: CPT | Performed by: PHYSICIAN ASSISTANT

## 2020-02-05 ASSESSMENT — MIFFLIN-ST. JEOR: SCORE: 1408.85

## 2020-02-05 NOTE — PROGRESS NOTES
SUBJECTIVE:                                                    Melissa Coronado is a 64 year old female who presents to clinic today for the following health issues:    Back Pain      Duration: over 10 years but gotten worse recently.        Specific cause: none    Description:   Location of pain: low back middle  Character of pain: sharp, dull ache and stabbing  Pain radiation:none  New numbness or weakness in legs, not attributed to pain:  YES- right hand and legs occasionally     Intensity: Currently 4/10    History:   Pain interferes with job: No  History of back problems: no prior back problems  Any previous MRI or X-rays: None  Sees a specialist for back pain:  No  Therapies tried without relief: none    Alleviating factors:   Improved by: otc pain meds help some      Precipitating factors:  Worsened by: being active, lifting, certain movements          Problem list and histories reviewed & adjusted, as indicated.  Additional history: as documented    Patient Active Problem List   Diagnosis     Hypertension goal BP (blood pressure) < 140/90     Morbid obesity (H)     Latent tuberculosis by blood test     Prediabetes     Chronic left-sided low back pain without sciatica     History reviewed. No pertinent surgical history.    Social History     Tobacco Use     Smoking status: Never Smoker     Smokeless tobacco: Never Used   Substance Use Topics     Alcohol use: Yes     Comment: small amt     Family History   Problem Relation Age of Onset     Hernia Brother      Other - See Comments Son         suffers from sneezing disorder         Current Outpatient Medications   Medication Sig Dispense Refill     Acetaminophen (TYLENOL EXTRA STRENGTH PO) Take 500 mg by mouth Reported on 4/13/2017       lisinopril (PRINIVIL/ZESTRIL) 10 MG tablet Take 1 tablet (10 mg) by mouth daily 90 tablet 1     nabumetone (RELAFEN) 750 MG tablet Take 1 tablet (750 mg) by mouth 2 times daily 60 tablet 0     diltiazem ER COATED BEADS (CARDIZEM  "CD/CARTIA XT) 180 MG 24 hr capsule Take 1 capsule (180 mg) by mouth daily (Patient not taking: Reported on 2/5/2020) 90 capsule 1     isoniazid (NYDRAZID) 300 MG tablet Take 1 tablet (300 mg) by mouth daily (Patient not taking: Reported on 2/5/2020) 90 tablet 2     metFORMIN (GLUCOPHAGE) 500 MG tablet Take 1 tablet (500 mg) by mouth daily (with breakfast) (Patient not taking: Reported on 2/5/2020) 90 tablet 1     pyridOXINE (VITAMIN B-6) 50 MG tablet Take 1 tablet (50 mg) by mouth daily (Patient not taking: Reported on 2/5/2020) 90 tablet 2     Allergies   Allergen Reactions     Sodium      Problem list, Medication list, Allergies, and Medical/Social/Surgical histories reviewed in EPIC and updated as appropriate.    Also noted that her blood pressure is elevated. States she wasn't been taking all her medication for unknown reason..     ROS:  CV: NEGATIVE for chest pain, palpitations or peripheral edema  C: NEGATIVE for fever, chills, change in weight  E/M: NEGATIVE for ear, mouth and throat problems  R: NEGATIVE for significant cough or SOB    OBJECTIVE:                                                    BP (!) 176/88   Pulse 66   Temp 97.6  F (36.4  C) (Oral)   Resp 18   Ht 1.537 m (5' 0.5\")   Wt 93.4 kg (206 lb)   SpO2 97%   BMI 39.57 kg/m    Body mass index is 39.57 kg/m .   GENERAL: healthy, alert, well nourished, well hydrated, no distress  RESP: lungs clear to auscultation - no rales, no rhonchi, no wheezes  CV: regular rates and rhythm, normal S1 S2, no S3 or S4 and no murmur, no click or rub -  ABDOMEN: soft, no tenderness, no  hepatosplenomegaly, no masses, normal bowel sounds  Lumber/Thoracic Spine Exam: Tender:  left parathoracic muscles, left para lumbar muscles  Non-tender:  thoracic spinous processes  Range of Motion:  full range of motion carlitos lower extremities   Strength:  5/5 carlitos lower extremities   Special tests:  negative straight leg raises  Hip Exam: Hip ROM full    X-ray of lumbar " reviewed from 2017     ASSESSMENT/PLAN:                                                        ICD-10-CM    1. Chronic left-sided low back pain without sciatica M54.5 [facility]    G89.29 nabumetone (RELAFEN) 750 MG tablet   2. Hypertension goal BP (blood pressure) < 140/90 I10    recommend PHYSICAL THERAPY.   Ok for relafen. warning signs discussed. side effects discussed  Recheck 1-2 wks. If not improving follow up  With sport med.   2. Get back on all medications and  Blood pressure check with PCP in 2 wks.     Nnamdi Miner PA-C  [facility]

## 2020-02-21 ENCOUNTER — OFFICE VISIT (OUTPATIENT)
Dept: FAMILY MEDICINE | Facility: CLINIC | Age: 65
End: 2020-02-21
Payer: MEDICAID

## 2020-02-21 VITALS
DIASTOLIC BLOOD PRESSURE: 77 MMHG | WEIGHT: 205 LBS | OXYGEN SATURATION: 97 % | RESPIRATION RATE: 20 BRPM | SYSTOLIC BLOOD PRESSURE: 140 MMHG | HEART RATE: 71 BPM | BODY MASS INDEX: 39.38 KG/M2 | TEMPERATURE: 98.4 F

## 2020-02-21 DIAGNOSIS — I10 HYPERTENSION GOAL BP (BLOOD PRESSURE) < 140/90: ICD-10-CM

## 2020-02-21 PROCEDURE — 99213 OFFICE O/P EST LOW 20 MIN: CPT | Performed by: FAMILY MEDICINE

## 2020-02-21 RX ORDER — LISINOPRIL 20 MG/1
20 TABLET ORAL DAILY
Qty: 30 TABLET | Refills: 2 | Status: SHIPPED | OUTPATIENT
Start: 2020-02-21 | End: 2020-06-15

## 2020-02-21 ASSESSMENT — PAIN SCALES - GENERAL: PAINLEVEL: NO PAIN (0)

## 2020-02-21 NOTE — PROGRESS NOTES
SUBJECTIVE:  Melissa Coronado is an 65 year old female who presents for a follow up evaluation of her hypertension.The patient was kept on the same medications at the last visit. The patient reports that she IS taking the medication as prescribed. She denies side effects of medication.      Patient Active Problem List   Diagnosis     Hypertension goal BP (blood pressure) < 140/90     Morbid obesity (H)     Latent tuberculosis by blood test     Prediabetes     Chronic left-sided low back pain without sciatica       Is the HYPERTENSION goal on the problem list? Yes      Use of agents associated with hypertension: nsaids  Current Outpatient Medications   Medication     Acetaminophen (TYLENOL EXTRA STRENGTH PO)     lisinopril (PRINIVIL/ZESTRIL) 10 MG tablet     nabumetone (RELAFEN) 750 MG tablet     metFORMIN (GLUCOPHAGE) 500 MG tablet     pyridOXINE (VITAMIN B-6) 50 MG tablet     No current facility-administered medications for this visit.          Allergies   Allergen Reactions     Sodium        Social History     Tobacco Use     Smoking status: Never Smoker     Smokeless tobacco: Never Used   Substance Use Topics     Alcohol use: Yes     Comment: small amt       OBJECTIVE:  BP (!) 140/77   Pulse 71   Temp 98.4  F (36.9  C) (Oral)   Resp 20   Wt 93 kg (205 lb)   SpO2 97%   BMI 39.38 kg/m      Heart: negative, PMI normal. No lifts, heaves, or thrills. RRR. No murmurs, clicks gallops or rub  Lower Extremities:No edema on right and No  edema on the left        No results found for any visits on 02/21/20.    The 10-year ASCVD risk score (Sherrysaman LEE Jr., et al., 2013) is: 12.8%    Values used to calculate the score:      Age: 65 years      Sex: Female      Is Non- : Yes      Diabetic: No      Tobacco smoker: No      Systolic Blood Pressure: 140 mmHg      Is BP treated: Yes      HDL Cholesterol: 75 mg/dL      Total Cholesterol: 232 mg/dL    ASSESSMENT:  Essential hypertension which is marginally  controlled.       Plan:  - Medication:increase the dose of lisinopril  to 20 mg.    The patient was advised to do the following therapuetic life style changes  - Dietary sodium restriction and increase potassium and Calcium intake  - Regular aerobic exercise  - Weight loss  - Discontinue smoking if applicable  - Avoid regular NSAID use if applicable  - Avoid regular decongestant use if applicable  - Follow up in clinic in 1 month for a recheck  - Check a basic metabolic panel today    Patient Education: Reviewed risks of hypertension and principles of   Treatment.

## 2020-02-21 NOTE — NURSING NOTE
"Chief Complaint   Patient presents with     Hypertension     Health Maintenance     order pended, wellness visit,        Initial BP (!) 169/72   Pulse 71   Temp 98.4  F (36.9  C) (Oral)   Resp 20   Wt 93 kg (205 lb)   SpO2 97%   BMI 39.38 kg/m   Estimated body mass index is 39.38 kg/m  as calculated from the following:    Height as of 2/5/20: 1.537 m (5' 0.5\").    Weight as of this encounter: 93 kg (205 lb).  Medication Reconciliation: complete  Lesa Michael, PHILLY  "

## 2020-06-03 DIAGNOSIS — I10 HYPERTENSION GOAL BP (BLOOD PRESSURE) < 140/90: ICD-10-CM

## 2020-06-03 NOTE — TELEPHONE ENCOUNTER
I review(ed) her last clinic note and it does not appear that she was taking it at the time if the appointment(s).   Please clarify with the patient and send back to me if I need to refill(s)/  Juanjo Engel MD

## 2020-06-03 NOTE — TELEPHONE ENCOUNTER
Routing refill request to provider for review/approval because:  Drug not active on patient's medication list  Per Electronic Health Record, medication removed from medication list 2/21/20 office visit with Dr Engel.  Unable to identify medication had been discontinued.  Please advise  Iris Su RN

## 2020-06-04 RX ORDER — DILTIAZEM HYDROCHLORIDE 180 MG/1
180 CAPSULE, COATED, EXTENDED RELEASE ORAL DAILY
Qty: 30 CAPSULE | Refills: 5 | OUTPATIENT
Start: 2020-06-04

## 2020-06-04 NOTE — TELEPHONE ENCOUNTER
Medication was discontinued in Feb so this RN sent pharmacy a denial with note that it has been discontinued.    Baylee Perry BSN, RN

## 2020-06-13 DIAGNOSIS — I10 HYPERTENSION GOAL BP (BLOOD PRESSURE) < 140/90: ICD-10-CM

## 2020-06-15 DIAGNOSIS — R73.03 PREDIABETES: ICD-10-CM

## 2020-06-15 RX ORDER — LISINOPRIL 20 MG/1
TABLET ORAL
Qty: 90 TABLET | Refills: 0 | Status: SHIPPED | OUTPATIENT
Start: 2020-06-15 | End: 2020-10-29

## 2020-06-15 NOTE — LETTER
June 18, 2020    Melissa Coronado  [address]  [place] [address]    Dear Melissa,       We recently received a refill request for METFORMIN  MG TABLET.  We have not refilled this because you are due for a:    6 month prediabetes office visit or video visit      Please call at your earliest convenience so that there will not be a delay with your future refills.          Thank you,   Your [facility] Team/Critical access hospital  [phone]

## 2020-06-15 NOTE — TELEPHONE ENCOUNTER
Routing refill request to provider for review/approval because:  Labs not current:  Creatinine, potassium   Patient failed:  Blood pressure under 140/90 in past 12 months  BP Readings from Last 3 Encounters:   02/21/20 (!) 140/77   02/05/20 (!) 176/88   06/05/19 118/74     Baylee Perry BSN, RN

## 2020-06-16 NOTE — TELEPHONE ENCOUNTER
She is due for 6 month follow-up. Please have her schedule video or office visit.     Yodit Moralez MD

## 2020-06-18 NOTE — TELEPHONE ENCOUNTER
I called the patient and LM reading the providers note as written.  Clinic number given.  Letter mailed.  Melissa Núñez,

## 2020-08-04 ENCOUNTER — TELEPHONE (OUTPATIENT)
Dept: FAMILY MEDICINE | Facility: CLINIC | Age: 65
End: 2020-08-04

## 2020-08-04 NOTE — TELEPHONE ENCOUNTER
Patient Quality Outreach      Summary:    Patient is due/failing the following:   BP check and Annual wellness, date due: 1/8/20    Type of outreach:    Sent letter.    Questions for provider review:    None                                                                                   **Start Working phrase here:**       Patient has the following on her problem list/HM:     Hypertension   Last three blood pressure readings:  BP Readings from Last 3 Encounters:   02/21/20 (!) 140/77   02/05/20 (!) 176/88   06/05/19 118/74     Blood pressure: Passed    HTN Guidelines:  ? 139/89                                                 Lesa Michael cma       Chart routed to letter sent.

## 2020-08-04 NOTE — LETTER
August 4, 2020      Melissa Ivonne  [address]  [facility] [address]      Melissa Calderon,     Our records indicate that you have not scheduled for a(n) blood pressure check and wellness visit which is due now and recommended by your health care team. Managing your preventative and chronic health conditions is important to us. During the COVID-19 pandemic, options are available for you to safely get the care you need. If you have received your health care elsewhere, please provide us with that information so it can be documented in your chart.        Thank you for choosing us as a partner in health care.     Your [facility] Care Team

## 2020-08-07 DIAGNOSIS — R73.03 PREDIABETES: ICD-10-CM

## 2020-08-10 NOTE — TELEPHONE ENCOUNTER
Routing refill request to provider for review/approval because:  Labs not current:  A1C, creatinine    Baylee MERRITTN, RN

## 2020-08-11 NOTE — TELEPHONE ENCOUNTER
I have not seen her in well over a year  Looks like she has been seeing [facility].   Either way she is overdue for diabetes visit  Please have her schedule  Can refill for 30 days to get her to her appt if needed.    Yodit Alcala MD

## 2020-08-11 NOTE — TELEPHONE ENCOUNTER
"Routing refill request to provider for review/approval because:  Failed protocol.  Please advise. Thank you. Adriana Lauren R.N.    Requested Prescriptions   Pending Prescriptions Disp Refills    metFORMIN (GLUCOPHAGE) 500 MG tablet [Pharmacy Med Name: METFORMIN  MG TABLET] 30 tablet 5     Sig: Take 1 tablet (500 mg) by mouth daily (with breakfast)       Biguanide Agents Failed - 8/11/2020  3:48 PM        Failed - Patient has documented A1c within the specified period of time.     If HgbA1C is 8 or greater, it needs to be on file within the past 3 months.  If less than 8, must be on file within the past 6 months.     Recent Labs   Lab Test 01/08/19  1037   A1C 6.1*             Failed - Patient's CR is NOT>1.4 OR Patient's EGFR is NOT<45 within past 12 mos.     Recent Labs   Lab Test 06/05/19  1109   GFRESTIMATED >90   GFRESTBLACK >90       Recent Labs   Lab Test 06/05/19  1109   CR 0.66             Passed - Patient is age 10 or older        Passed - Patient does NOT have a diagnosis of CHF.        Passed - Medication is active on med list        Passed - Patient is not pregnant        Passed - Patient has not had a positive pregnancy test within the past 12 mos.         Passed - Recent (6 mo) or future (30 days) visit within the authorizing provider's specialty     Patient had office visit in the last 6 months or has a visit in the next 30 days with authorizing provider or within the authorizing provider's specialty.  See \"Patient Info\" tab in inbasket, or \"Choose Columns\" in Meds & Orders section of the refill encounter.                       "

## 2020-08-26 DIAGNOSIS — R73.03 PREDIABETES: ICD-10-CM

## 2020-08-26 NOTE — TELEPHONE ENCOUNTER
I have not seen her since 1/19  Needs to follow-up with Dr. Engel for refillss    Yodit Alcala MD

## 2020-09-29 ENCOUNTER — ANCILLARY PROCEDURE (OUTPATIENT)
Dept: GENERAL RADIOLOGY | Facility: CLINIC | Age: 65
End: 2020-09-29
Attending: FAMILY MEDICINE
Payer: MEDICAID

## 2020-09-29 ENCOUNTER — OFFICE VISIT (OUTPATIENT)
Dept: FAMILY MEDICINE | Facility: CLINIC | Age: 65
End: 2020-09-29
Payer: MEDICAID

## 2020-09-29 VITALS
HEART RATE: 67 BPM | BODY MASS INDEX: 42.45 KG/M2 | TEMPERATURE: 97.7 F | DIASTOLIC BLOOD PRESSURE: 72 MMHG | SYSTOLIC BLOOD PRESSURE: 142 MMHG | OXYGEN SATURATION: 98 % | WEIGHT: 221 LBS

## 2020-09-29 DIAGNOSIS — G89.29 CHRONIC LEFT-SIDED LOW BACK PAIN WITHOUT SCIATICA: ICD-10-CM

## 2020-09-29 DIAGNOSIS — Z23 NEED FOR PROPHYLACTIC VACCINATION AND INOCULATION AGAINST INFLUENZA: ICD-10-CM

## 2020-09-29 DIAGNOSIS — M54.50 CHRONIC LEFT-SIDED LOW BACK PAIN WITHOUT SCIATICA: ICD-10-CM

## 2020-09-29 DIAGNOSIS — G89.29 CHRONIC BILATERAL LOW BACK PAIN WITHOUT SCIATICA: Primary | ICD-10-CM

## 2020-09-29 DIAGNOSIS — M54.50 CHRONIC BILATERAL LOW BACK PAIN WITHOUT SCIATICA: Primary | ICD-10-CM

## 2020-09-29 PROCEDURE — 90662 IIV NO PRSV INCREASED AG IM: CPT | Performed by: FAMILY MEDICINE

## 2020-09-29 PROCEDURE — 72100 X-RAY EXAM L-S SPINE 2/3 VWS: CPT

## 2020-09-29 PROCEDURE — 90471 IMMUNIZATION ADMIN: CPT | Performed by: FAMILY MEDICINE

## 2020-09-29 PROCEDURE — 99214 OFFICE O/P EST MOD 30 MIN: CPT | Mod: 25 | Performed by: FAMILY MEDICINE

## 2020-09-29 ASSESSMENT — PAIN SCALES - GENERAL: PAINLEVEL: WORST PAIN (10)

## 2020-09-29 NOTE — PROGRESS NOTES
SUBJECTIVE:  Melissa Coronado is a 65 year old female who is seen for right lower back injury that occurred 1 month(s) ago. The onset of the pain was sudden.  The pain started after an injury in which the patient was lifting a bucket of water   T  The patient reports that these symptoms have been waxing and waning since that time  Palliative factors for the pain include:  Rest   Provacative factors include: twisting sweeping     She started having pain 2-3 month(s) ago after standing for a long time.     The patient reports that the pain does radiate up her back and to her hips        The patient denies incontinence of urine or stool.    The patient reports no prior history of problems with her back.   The patient reports that she is not  employed She used to work in a group home.     The patients past medical, surgical, social and family histories were reviewed.  Social History     Socioeconomic History     Marital status:      Spouse name: None     Number of children: None     Years of education: None     Highest education level: None   Occupational History     None   Social Needs     Financial resource strain: None     Food insecurity     Worry: None     Inability: None     Transportation needs     Medical: None     Non-medical: None   Tobacco Use     Smoking status: Never Smoker     Smokeless tobacco: Never Used   Substance and Sexual Activity     Alcohol use: Yes     Comment: small amt     Drug use: No     Sexual activity: Yes     Partners: Male   Lifestyle     Physical activity     Days per week: None     Minutes per session: None     Stress: None   Relationships     Social connections     Talks on phone: None     Gets together: None     Attends Bahai service: None     Active member of club or organization: None     Attends meetings of clubs or organizations: None     Relationship status: None     Intimate partner violence     Fear of current or ex partner: None     Emotionally abused: None      Physically abused: None     Forced sexual activity: None   Other Topics Concern     Parent/sibling w/ CABG, MI or angioplasty before 65F 55M? Not Asked   Social History Narrative     None     History reviewed. No pertinent past medical history.  Current Outpatient Medications   Medication Sig Dispense Refill     Acetaminophen (TYLENOL EXTRA STRENGTH PO) Take 500 mg by mouth Reported on 4/13/2017       lisinopril (ZESTRIL) 20 MG tablet TAKE 1 TABLET BY MOUTH EVERY DAY 90 tablet 0     metFORMIN (GLUCOPHAGE) 500 MG tablet TAKE 1 TABLET (500 MG) BY MOUTH DAILY (WITH BREAKFAST) 30 tablet 0     nabumetone (RELAFEN) 750 MG tablet Take 1 tablet (750 mg) by mouth 2 times daily 60 tablet 0     pyridOXINE (VITAMIN B-6) 50 MG tablet Take 1 tablet (50 mg) by mouth daily 90 tablet 2         ROS:        Allergies as of 09/29/2020 - Reviewed 09/29/2020   Allergen Reaction Noted     Sodium  12/22/2017     Current Outpatient Medications   Medication     Acetaminophen (TYLENOL EXTRA STRENGTH PO)     lisinopril (ZESTRIL) 20 MG tablet     metFORMIN (GLUCOPHAGE) 500 MG tablet     nabumetone (RELAFEN) 750 MG tablet     pyridOXINE (VITAMIN B-6) 50 MG tablet     No current facility-administered medications for this visit.          Examination:  BP (!) 142/72   Pulse 67   Temp 97.7  F (36.5  C) (Tympanic)   Wt 100.2 kg (221 lb)   SpO2 98%   BMI 42.45 kg/m    General: healthy, alert and no distress.  Neuro exam of the lower extremities.   DTR's  Right knee 1+  Right ankle 1+  Left knee 1+  Left ankle1+  Strength was +5 globally in the lower extremities.  Back examination: There was tenderness to palpation of the left sacroliliac joint.  There was not CVA tenderness bilaterally.  Straight leg raise test was negative bilaterally.  There was decreased active range of motion in hip flexion bilaterally      Fabers test: negative      Narrative & Impression      LUMBAR SPINE TWO TO THREE VIEWS September 29, 2020 11:52 AM      HISTORY: Chronic  bilateral low back pain without sciatica.     COMPARISON: Lumbar spine x-ray 4/13/2017.                                                                       IMPRESSION: Alignment of the lumbar spine is within normal limits. No  loss of vertebral body height. Mild degenerative endplate changes and  loss of disc height at L4-L5. No significant change since prior.      DEBBY PRO MD           ASSESSMENT/IMPRESSION:  musculoskeletal low back pain and pain secondary to osteoarthritis of the lumbar spine    PLAN:    The patient and I discussed the absolute importance of continuing to do range of motion exercises and strengthening exercises despite the immediate discomfort that it may cause.We discussed the use of non steroidal anti-inflammatory drugs and muscle relaxants to help them in this goal. Heat therapy in the morning to improve range of motion was emphasized. The patient was advised only to use ice at the end of the day for pain relief if needed.  The patient was referred to physical therapy for evaluation and treatment.  We will continue(s)  the patient on the relafen to be used as needed       Follow up in 3 months. if the symptom(s) are not improving.

## 2020-10-09 ENCOUNTER — THERAPY VISIT (OUTPATIENT)
Dept: PHYSICAL THERAPY | Facility: CLINIC | Age: 65
End: 2020-10-09
Payer: MEDICAID

## 2020-10-09 DIAGNOSIS — M54.50 CHRONIC LEFT-SIDED LOW BACK PAIN WITHOUT SCIATICA: ICD-10-CM

## 2020-10-09 DIAGNOSIS — G89.29 CHRONIC LEFT-SIDED LOW BACK PAIN WITHOUT SCIATICA: ICD-10-CM

## 2020-10-09 PROCEDURE — 97110 THERAPEUTIC EXERCISES: CPT | Mod: GP | Performed by: PHYSICAL THERAPIST

## 2020-10-09 PROCEDURE — 97161 PT EVAL LOW COMPLEX 20 MIN: CPT | Mod: GP | Performed by: PHYSICAL THERAPIST

## 2020-10-09 NOTE — LETTER
"DEPARTMENT OF HEALTH AND HUMAN SERVICES  [facility]    PLAN/UPDATED PLAN OF PROGRESS FOR OUTPATIENT REHABILITATION    PATIENTS NAME:  Melissa Coronado   : 1955  PROVIDER NUMBER:    [id number]  [id number]  PROVIDER NAME: OCTAVIO ORNELAS PT  MEDICAL RECORD NUMBER: [id number]   START OF CARE DATE:  SOC Date: 10/09/20   TYPE:  PT  PRIMARY/TREATMENT DIAGNOSIS: (Pertinent Medical Diagnosis)  Chronic left-sided low back pain without sciatica  VISITS FROM START OF CARE:  Rxs Used: 1     Physical Therapy Initial Evaluation  2020     MD Instructions/Precautions/Restrictions: PT eval and treat.     Therapist Impression:   Melissa Coronado presents with findings consistent with low back pain, with related impairments limiting her ability to walk, sit, or stand. Skilled PT services are necessary in order to reduce impairments and improve independent function.   Subjective:   C/C: hx of recurrent low back pain. Recently had LBP present then went to try and  a bucket (which wasn't heavy) which made back pain worse. Does have pain in the legs but does not think it is associated with the back pain. Since onset, symptoms are worsening.  DOI/onset: 20 (MD's orders)  Red Flags:none reported per patient  Location:central midline pain bilaterally Quality: \"pain\".   Frequency: intermittent; pain is not dependent on the day Pain scale: 0/10.   Previous Treatment: none Effect of Previous Treatment: N/A  Worsened by: standing limited to 5 minutes, walking limited to 30 minutes, no issues with sleeping.  Alleviated by: pain medication short term relief   Pertinent medical/surgical history: Refer to health history in EMR. Imaging: x-ray. 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 Current Exercise Regimen: walking program for 30 minutes Patient's goals are: decrease pain. Return to MD:  PRN.     Objective:  LUMBAR:  Posture: moderate anterior pelvic tilt and " increased lumbar lordosis    AROM: (Major, Moderate, Minimal or Nil loss) Baseline: 0/10  Movement Loss Fito Mod Min Nil Pain   Flexion    x    Extension  x      Left Manoj-bending   x     Right Side-bending   x         PATIENTS NAME:  Melissa Coronado: 1955    Neurological:    Motor Deficit:  Myotomes L R   L1-2 (hip flexion) -4/5 -4/5   L3 (knee extension) 5/5 5/5   L4 (ankle DF) 5/5 5/5   L5 (g. toe ext)     S1 (ankle PF or knee flex) 5 reps 5 reps     Sensory Deficit:normal to light touch  Dural Signs:   L R   Slump     SLR - -     Repeated movement testing:   (During: produces, abolishes, increases, decreases, no effect, centralizing, peripheralizing; After: better, worse, no better, no worse, no effect, centralized, peripheralized)    Pre-test Symptoms Standin/10   Symptoms During Symptoms After ROM increased ROM decreased No Effect   FIS        Rep FIS        EIS     x   Rep EIS     x   Pre-test Symptoms Lyin/10   Symptoms During Symptoms After ROM increased ROM decreased No Effect   JOSE ALBERTO        Rep JOSE ALBERTO        EIL     x   Rep EIL   x       Assessment/Plan:    The patient is a 65 year old female with chief complaint of lumbar pain.    The patient has the following significant findings with corresponding treatment plan.  Diagnosis 1:  Chronic lumbar pain    Pain -  manual therapy, self management, education and home program  Decreased ROM/flexibility - manual therapy, therapeutic exercise and home program  Decreased joint mobility - manual therapy, therapeutic exercise and home program  Decreased strength - therapeutic exercise, therapeutic activities and home program  Impaired balance - neuro re-education, therapeutic activities and home program  Decreased proprioception - neuro re-education and therapeutic activities  Impaired gait - gait training and assistive devices  Impaired muscle performance - neuro re-education and home program  Decreased function - therapeutic activities and home  program  Impaired posture - neuro re-education, therapeutic activities and home program  PATIENTS NAME:  Melissa Coronado   : 1955    Instability -  Therapeutic Activity, Therapeutic Exercise, Neuromuscular Re-education, Splinting/Taping/Bracing/Orthotic, home program    Therapy Evaluation Codes:   1) History comprised of:   Personal factors that impact the plan of care:      Please refer to health history in EMR.    Comorbidity factors that impact the plan of care are:      Please refer to health history in EMR.     Medications impacting care: None.  2) Examination of Body Systems comprised of:   Body structures and functions that impact the plan of care:      Lumbar spine.   Activity limitations that impact the plan of care are:      Bending, Sitting, Standing and Walking.   Clinical presentation characteristics are:    Stable/Uncomplicated.  3) Presentation comprised of:   Presentation scored as Low complexity with uncomplicated characteristics..  4) Decision-Making    Low complexity using standardized patient assessment instrument and/or measureable assessment of functional outcome.  Cumulative Therapy Evaluation is: Low complexity.  Previous and current functional limitations:  (See Goal Flow Sheet for this information)    Short term and Long term goals: (See Goal Flow Sheet for this information)   Communication ability:  Patient appears to be able to clearly communicate and understand verbal and written communication and follow directions correctly.  Treatment Explanation - The following has been discussed with the patient: RX ordered/plan of care, anticipated outcomes, and possible risks and side effects.  This patient would benefit from PT intervention to resume normal activities.   Rehab potential is good.  Frequency:  1 X week, once daily  Duration:  for 6 weeks  Discharge Plan: Achieve all LTGs, be independent in home treatment program, and reach maximal therapeutic benefit.  Please refer to the  "daily flowsheet for treatment today, total treatment time and time spent performing 1:1 timed codes.         Caregiver Signature/Credentials _____________________________ Date ________      Treating Provider: Kassy Wyman PT   I have reviewed and certified the need for these services and plan of treatment while under my care.        PHYSICIAN'S SIGNATURE:   _________________________________________  Date___________   Juanjo Engel M.D.      PATIENTS NAME:  Melissa Coronado   : 1955      Certification period:  Beginning of Cert date period: 10/09/20 to  End of Cert period date: 20     Functional Level Progress Report: Please see attached \"Goal Flow sheet for Functional level.\"    ____X____ Continue Services or       ________ DC Services                Service dates: From  SOC Date: 10/09/20 date to present                         "

## 2020-10-09 NOTE — PROGRESS NOTES
"Patient seen for one time evaluation and treatment.  Patient did not return for further treatment and current status is unknown.  Please see initial evaluation for further information.    Physical Therapy Initial Evaluation  October 9, 2020     MD Instructions/Precautions/Restrictions: PT eval and treat.     Therapist Impression:   Melissa Coronado presents with findings consistent with low back pain, with related impairments limiting her ability to walk, sit, or stand. Skilled PT services are necessary in order to reduce impairments and improve independent function.     Subjective:   C/C: hx of recurrent low back pain. Recently had LBP present then went to try and  a bucket (which wasn't heavy) which made back pain worse. Does have pain in the legs but does not think it is associated with the back pain. Since onset, symptoms are worsening.    DOI/onset: 9/29/20 (MD's orders)  Red Flags:none reported per patient  Location:central midline pain bilaterally Quality: \"pain\".   Frequency: intermittent; pain is not dependent on the day Pain scale: 0/10.   Previous Treatment: none Effect of Previous Treatment: N/A  Worsened by: standing limited to 5 minutes, walking limited to 30 minutes, no issues with sleeping.  Alleviated by: pain medication short term relief   Pertinent medical/surgical history: Refer to health history in EMR. Imaging: x-ray. 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 Current Exercise Regimen: walking program for 30 minutes Patient's goals are: decrease pain. Return to MD:  PRN.     Objective:  LUMBAR:    Posture: moderate anterior pelvic tilt and increased lumbar lordosis    AROM: (Major, Moderate, Minimal or Nil loss) Baseline: 0/10  Movement Loss Fito Mod Min Nil Pain   Flexion    x    Extension  x      Left Manoj-bending   x     Right Side-bending   x       Neurological:    Motor Deficit:  Myotomes L R   L1-2 (hip flexion) -4/5 -4/5   L3 (knee extension) 5/5 5/5   L4 (ankle " DF) 5/5 5/5   L5 (g. toe ext)     S1 (ankle PF or knee flex) 5 reps 5 reps     Sensory Deficit:normal to light touch    Dural Signs:   L R   Slump     SLR - -     Repeated movement testing:   (During: produces, abolishes, increases, decreases, no effect, centralizing, peripheralizing; After: better, worse, no better, no worse, no effect, centralized, peripheralized)    Pre-test Symptoms Standin/10   Symptoms During Symptoms After ROM increased ROM decreased No Effect   FIS        Rep FIS        EIS     x   Rep EIS     x   Pre-test Symptoms Lyin/10   Symptoms During Symptoms After ROM increased ROM decreased No Effect   JOSE ALBERTO        Rep JOSE ALBERTO        EIL     x   Rep EIL   x       Assessment/Plan:    The patient is a 65 year old female with chief complaint of lumbar pain.    The patient has the following significant findings with corresponding treatment plan.  Diagnosis 1:  Chronic lumbar pain    Pain -  manual therapy, self management, education and home program  Decreased ROM/flexibility - manual therapy, therapeutic exercise and home program  Decreased joint mobility - manual therapy, therapeutic exercise and home program  Decreased strength - therapeutic exercise, therapeutic activities and home program  Impaired balance - neuro re-education, therapeutic activities and home program  Decreased proprioception - neuro re-education and therapeutic activities  Impaired gait - gait training and assistive devices  Impaired muscle performance - neuro re-education and home program  Decreased function - therapeutic activities and home program  Impaired posture - neuro re-education, therapeutic activities and home program  Instability -  Therapeutic Activity, Therapeutic Exercise, Neuromuscular Re-education, Splinting/Taping/Bracing/Orthotic, home program    Therapy Evaluation Codes:   1) History comprised of:   Personal factors that impact the plan of care:      Please refer to health history in EMR.    Comorbidity  factors that impact the plan of care are:      Please refer to health history in EMR.     Medications impacting care: None.  2) Examination of Body Systems comprised of:   Body structures and functions that impact the plan of care:      Lumbar spine.   Activity limitations that impact the plan of care are:      Bending, Sitting, Standing and Walking.   Clinical presentation characteristics are:    Stable/Uncomplicated.  3) Presentation comprised of:   Presentation scored as Low complexity with uncomplicated characteristics..  4) Decision-Making    Low complexity using standardized patient assessment instrument and/or measureable assessment of functional outcome.  Cumulative Therapy Evaluation is: Low complexity.    Previous and current functional limitations:  (See Goal Flow Sheet for this information)    Short term and Long term goals: (See Goal Flow Sheet for this information)     Communication ability:  Patient appears to be able to clearly communicate and understand verbal and written communication and follow directions correctly.  Treatment Explanation - The following has been discussed with the patient: RX ordered/plan of care, anticipated outcomes, and possible risks and side effects.  This patient would benefit from PT intervention to resume normal activities.   Rehab potential is good.    Frequency:  1 X week, once daily  Duration:  for 6 weeks  Discharge Plan: Achieve all LTGs, be independent in home treatment program, and reach maximal therapeutic benefit.    Please refer to the daily flowsheet for treatment today, total treatment time and time spent performing 1:1 timed codes.

## 2020-10-16 PROBLEM — M54.50 CHRONIC LEFT-SIDED LOW BACK PAIN WITHOUT SCIATICA: Status: RESOLVED | Noted: 2020-02-05 | Resolved: 2020-10-16

## 2020-10-16 PROBLEM — G89.29 CHRONIC LEFT-SIDED LOW BACK PAIN WITHOUT SCIATICA: Status: RESOLVED | Noted: 2020-02-05 | Resolved: 2020-10-16

## 2020-10-29 DIAGNOSIS — I10 HYPERTENSION GOAL BP (BLOOD PRESSURE) < 140/90: ICD-10-CM

## 2020-10-29 RX ORDER — LISINOPRIL 20 MG/1
TABLET ORAL
Qty: 90 TABLET | Refills: 0 | Status: SHIPPED | OUTPATIENT
Start: 2020-10-29 | End: 2021-02-08

## 2020-10-29 NOTE — TELEPHONE ENCOUNTER
Routing refill request to provider for review/approval because:  Labs out of range:  Creatinine, potassium    Creatinine   Date Value Ref Range Status   06/05/2019 0.66 0.52 - 1.04 mg/dL Final     Potassium   Date Value Ref Range Status   06/05/2019 4.0 3.4 - 5.3 mmol/L Final     Patient failed:  Blood pressure under 140/90 in past 12 months  BP Readings from Last 3 Encounters:   09/29/20 (!) 142/72   02/21/20 (!) 140/77   02/05/20 (!) 176/88     Baylee Perry BSN, RN

## 2020-11-25 DIAGNOSIS — R73.03 PREDIABETES: ICD-10-CM

## 2020-11-25 NOTE — TELEPHONE ENCOUNTER
"Routing refill request to provider for review/approval because:  Failed protocol.  No future appointment scheduled. Please advise. Thank you. Adriana Lauren R.N.    Requested Prescriptions   Pending Prescriptions Disp Refills    metFORMIN (GLUCOPHAGE) 500 MG tablet [Pharmacy Med Name: METFORMIN  MG TABLET] 30 tablet 0     Sig: TAKE 1 TABLET BY MOUTH DAILY WITH BREAKFAST       Biguanide Agents Failed - 11/25/2020  9:34 AM        Failed - Patient has documented A1c within the specified period of time.     If HgbA1C is 8 or greater, it needs to be on file within the past 3 months.  If less than 8, must be on file within the past 6 months.     Recent Labs   Lab Test 01/08/19  1037   A1C 6.1*             Failed - Patient's CR is NOT>1.4 OR Patient's EGFR is NOT<45 within past 12 mos.     Recent Labs   Lab Test 06/05/19  1109   GFRESTIMATED >90   GFRESTBLACK >90       Recent Labs   Lab Test 06/05/19  1109   CR 0.66             Passed - Patient is age 10 or older        Passed - Patient does NOT have a diagnosis of CHF.        Passed - Medication is active on med list        Passed - Patient is not pregnant        Passed - Patient has not had a positive pregnancy test within the past 12 mos.         Passed - Recent (6 mo) or future (30 days) visit within the authorizing provider's specialty     Patient had office visit in the last 6 months or has a visit in the next 30 days with authorizing provider or within the authorizing provider's specialty.  See \"Patient Info\" tab in inbasket, or \"Choose Columns\" in Meds & Orders section of the refill encounter.                       "

## 2021-02-07 DIAGNOSIS — I10 HYPERTENSION GOAL BP (BLOOD PRESSURE) < 140/90: ICD-10-CM

## 2021-02-08 RX ORDER — LISINOPRIL 20 MG/1
TABLET ORAL
Qty: 90 TABLET | Refills: 0 | Status: SHIPPED | OUTPATIENT
Start: 2021-02-08 | End: 2021-06-06

## 2021-02-08 NOTE — TELEPHONE ENCOUNTER
"Requested Prescriptions   Pending Prescriptions Disp Refills    lisinopril (ZESTRIL) 20 MG tablet [Pharmacy Med Name: LISINOPRIL 20 MG TABLET] 90 tablet 0     Sig: TAKE 1 TABLET BY MOUTH EVERY DAY       ACE Inhibitors (Including Combos) Protocol Failed - 2/7/2021 10:06 AM        Failed - Blood pressure under 140/90 in past 12 months     BP Readings from Last 3 Encounters:   09/29/20 (!) 142/72   02/21/20 (!) 140/77   02/05/20 (!) 176/88                 Failed - Normal serum creatinine on file in past 12 months     Recent Labs   Lab Test 06/05/19  1109   CR 0.66       Ok to refill medication if creatinine is low          Failed - Normal serum potassium on file in past 12 months     Recent Labs   Lab Test 06/05/19  1109   POTASSIUM 4.0             Passed - Recent (12 mo) or future (30 days) visit within the authorizing provider's specialty     Patient has had an office visit with the authorizing provider or a provider within the authorizing providers department within the previous 12 mos or has a future within next 30 days. See \"Patient Info\" tab in inbasket, or \"Choose Columns\" in Meds & Orders section of the refill encounter.              Passed - Medication is active on med list        Passed - Patient is age 18 or older        Passed - No active pregnancy on record        Passed - No positive pregnancy test within past 12 months             "

## 2021-03-10 ENCOUNTER — OFFICE VISIT (OUTPATIENT)
Dept: FAMILY MEDICINE | Facility: CLINIC | Age: 66
End: 2021-03-10
Payer: MEDICAID

## 2021-03-10 VITALS
OXYGEN SATURATION: 96 % | SYSTOLIC BLOOD PRESSURE: 138 MMHG | WEIGHT: 223 LBS | HEIGHT: 62 IN | TEMPERATURE: 98.3 F | BODY MASS INDEX: 41.04 KG/M2 | HEART RATE: 71 BPM | DIASTOLIC BLOOD PRESSURE: 80 MMHG

## 2021-03-10 DIAGNOSIS — Z22.7 LATENT TUBERCULOSIS BY BLOOD TEST: ICD-10-CM

## 2021-03-10 DIAGNOSIS — L60.8 TOENAIL DEFORMITY: Primary | ICD-10-CM

## 2021-03-10 DIAGNOSIS — G89.29 CHRONIC LEFT-SIDED LOW BACK PAIN WITHOUT SCIATICA: ICD-10-CM

## 2021-03-10 DIAGNOSIS — R73.03 PREDIABETES: ICD-10-CM

## 2021-03-10 DIAGNOSIS — M54.50 CHRONIC LEFT-SIDED LOW BACK PAIN WITHOUT SCIATICA: ICD-10-CM

## 2021-03-10 PROCEDURE — 88312 SPECIAL STAINS GROUP 1: CPT | Performed by: PATHOLOGY

## 2021-03-10 PROCEDURE — 88304 TISSUE EXAM BY PATHOLOGIST: CPT | Performed by: PATHOLOGY

## 2021-03-10 PROCEDURE — 99214 OFFICE O/P EST MOD 30 MIN: CPT | Performed by: FAMILY MEDICINE

## 2021-03-10 RX ORDER — PYRIDOXINE HCL (VITAMIN B6) 50 MG
50 TABLET ORAL DAILY
Qty: 90 TABLET | Refills: 3 | Status: SHIPPED | OUTPATIENT
Start: 2021-03-10

## 2021-03-10 RX ORDER — MELOXICAM 15 MG/1
15 TABLET ORAL DAILY PRN
Qty: 30 TABLET | Refills: 1 | Status: SHIPPED | OUTPATIENT
Start: 2021-03-10

## 2021-03-10 ASSESSMENT — MIFFLIN-ST. JEOR: SCORE: 1499.77

## 2021-03-10 NOTE — PROGRESS NOTES
SUBJECTIVE  HPI:Melissa Coronado is a 66 year old female who presents for follow up evaluation of her  back pain.The patient was last seen for this problem 6 months ago by myself.    She was seen at the physical therapist at [place] once  She did the therapay for a while but it did not help and in fact it made it worse.   She reports that twisting or bending is painful.   The medication I gave her eases the pain but it still comes back just as bad as previously       The patient denies any new symptoms or any worsening of any symptoms. She specifically reports that the pain does radiate.  It radiates to the lateral right lower abdominal and the right upper thigh.       Previous plain films were review(ed)Theses films showed: .  LUMBAR SPINE TWO TO THREE VIEWS September 29, 2020 11:52 AM      HISTORY: Chronic bilateral low back pain without sciatica.     COMPARISON: Lumbar spine x-ray 4/13/2017.                                                                       IMPRESSION: Alignment of the lumbar spine is within normal limits. No  loss of vertebral body height. Mild degenerative endplate changes and  loss of disc height at L4-L5. No significant change since prior.      DEBBY PRO MD      Allergies as of 03/10/2021 - Reviewed 09/29/2020   Allergen Reaction Noted     Sodium  12/22/2017     Current Outpatient Medications   Medication     Acetaminophen (TYLENOL EXTRA STRENGTH PO)     lisinopril (ZESTRIL) 20 MG tablet     metFORMIN (GLUCOPHAGE) 500 MG tablet     nabumetone (RELAFEN) 750 MG tablet     pyridOXINE (VITAMIN B-6) 50 MG tablet     No current facility-administered medications for this visit.          Examination:  There were no vitals taken for this visit.  General: healthy, alert, no distress and over weight.  Neuro exam of the lower extremities.   DTR's  Right knee 1+  Right ankle 1+  Left knee 1+  Left ankle1+  Strength was +5 globally in the lower extremities.    Straight leg raise test wasnegative  bilaterally.  There was decreased active range of motion in hip flexion on the right      ASSESSMENT/IMPRESSION:  musculoskeletal low back pain and pain secondary to osteoarthritis of the lumbar spine    PLAN:    The patient and I discussed the absolute importance of continuing to do range of motion exercises and strengthening exercises despite the immediate discomfort that it may cause.We discussed the use of non steroidal anti-inflammatory drugs  to help them in this goal. Heat therapy in the morning to improve range of motion was emphasized. The patient was advised only to use ice at the end of the day for pain relief if needed.  The patient will try an alternative non steroidal anti-inflammatory drug(s)     We plan a follow up via a virtual visit in 4 weeks     If her symptom(s) are not under good control we will consider a pain management referral.     --------------------------------------------------------------------------------------------------------------------------------------    SUBJECTIVE:  Melissa Coronado is a 66 year old female who is seen as self referral for  left first toe nail pain  itis thick and irregular and it hurts on the lateral sided      The patients past medical, surgical, social and family histories were reviewed.  Social History     Socioeconomic History     Marital status:      Spouse name: None     Number of children: None     Years of education: None     Highest education level: None   Occupational History     None   Social Needs     Financial resource strain: None     Food insecurity     Worry: None     Inability: None     Transportation needs     Medical: None     Non-medical: None   Tobacco Use     Smoking status: Never Smoker     Smokeless tobacco: Never Used   Substance and Sexual Activity     Alcohol use: Yes     Comment: small amt     Drug use: No     Sexual activity: Yes     Partners: Male   Lifestyle     Physical activity     Days per week: None     Minutes per  "session: None     Stress: None   Relationships     Social connections     Talks on phone: None     Gets together: None     Attends Voodoo service: None     Active member of club or organization: None     Attends meetings of clubs or organizations: None     Relationship status: None     Intimate partner violence     Fear of current or ex partner: None     Emotionally abused: None     Physically abused: None     Forced sexual activity: None   Other Topics Concern     Parent/sibling w/ CABG, MI or angioplasty before 65F 55M? Not Asked   Social History Narrative     None             OBJECTIVE:  /80   Pulse 71   Temp 98.3  F (36.8  C) (Tympanic)   Ht 1.575 m (5' 2\")   Wt 101.2 kg (223 lb)   SpO2 96%   BMI 40.79 kg/m      TOE EXAM:  GENERAL APPEARANCE: healthy, alert and no distress  SKIN: no suspicious lesions or rashes    Exam of the affected toe:  Inspection: thickened irregular nail with polish on it.       X-RAY INTERPRETATION  X-ray: not indicated.      ASSESSMENT:  Toe probable onychomycosis      PLAN:   Nail sample taken for PAS       "

## 2021-03-10 NOTE — LETTER
"March 15, 2021      Melissa Adam  [address]  [place] [address]        Melissa,   I have reviewed the results of the laboratory tests that we recently ordered. All of the lab work performed was normal or considered normal for you. The test did not see any fungus in the nails. I would recommend(ed) a dermatology appointment(s) to further evaluate the problem. I have put in a referral. You should call specialty scheduling at [phone] to schedule the  appointment.     Sincerely,   Juanjo Engel MD     Resulted Orders   Surgical pathology exam   Result Value Ref Range    Copath Report       Patient Name: MELISSA ADAM  MR#: [id number]  Specimen #: [id number]  Collected: 3/10/2021  Received: 3/11/2021  Reported: 3/12/2021 15:37  Ordering Phy(s): JUANJO ENGEL    For improved result formatting, select 'View Enhanced Report Format' under   Linked Documents section.    SPECIMEN(S):  Left great toenail    FINAL DIAGNOSIS:  Left great toenail, clipping for evaluation for fungus:  - Methenamine silver stain NEGATIVE for fungal microorganisms.    Electronically signed out by:    Callie Jasmine M.D.    CLINICAL HISTORY:  66 year old female.  Dysmorphic left great toenail with pain.    GROSS:  The specimen is received in formalin with proper patient identification   labeled \"left great toenail\".  The  specimen consists of tan rubbery tissue fragment measuring 0.5 x 0.2 x 0.2   cm.  The specimen is entirely  submitted in one cassette. (Dictated by: Gustavo Torres 3/11/2021 01:27   PM)    MICROSCOPIC:  Microscopic examination is performed. The methenamine silver stain control   erik cts appropriately.    The technical component of this testing was completed at the [facility], with the professional component performed   at the [facility], [address] [place] [address]  [phone] ([phone])    CPT Codes:  A: 36126-KZ4, " 38058-QJN    COLLECTION SITE:  Client: [facility]  Location: [facility] (B)

## 2021-03-12 DIAGNOSIS — L60.9 NAIL ABNORMALITY: Primary | ICD-10-CM

## 2021-03-12 LAB — COPATH REPORT: NORMAL

## 2021-03-12 NOTE — RESULT ENCOUNTER NOTE
Melissa,  I have reviewed the results of the laboratory tests that we recently ordered. All of the lab work performed was normal or considered normal for you. The test did not see any fungus in the nails. I would recommend(ed) a dermatology appointment(s) to further evaluate the problem. I have put in a referral. You should call specialty scheduling at [phone] to schedule the  appointment.     Sincerely,   Juanjo Engel MD

## 2021-06-04 DIAGNOSIS — R73.03 PREDIABETES: ICD-10-CM

## 2021-06-05 DIAGNOSIS — I10 HYPERTENSION GOAL BP (BLOOD PRESSURE) < 140/90: ICD-10-CM

## 2021-06-06 RX ORDER — LISINOPRIL 20 MG/1
TABLET ORAL
Qty: 90 TABLET | Refills: 0 | Status: SHIPPED | OUTPATIENT
Start: 2021-06-06 | End: 2021-09-10

## 2021-07-08 ENCOUNTER — IMMUNIZATION (OUTPATIENT)
Dept: NURSING | Facility: CLINIC | Age: 66
End: 2021-07-08
Payer: MEDICAID

## 2021-07-08 PROCEDURE — 0011A PR COVID VAC MODERNA 100 MCG/0.5 ML IM: CPT

## 2021-07-08 PROCEDURE — 91301 PR COVID VAC MODERNA 100 MCG/0.5 ML IM: CPT

## 2021-08-06 ENCOUNTER — IMMUNIZATION (OUTPATIENT)
Dept: NURSING | Facility: CLINIC | Age: 66
End: 2021-08-06
Attending: PHYSICIAN ASSISTANT
Payer: COMMERCIAL

## 2021-08-06 PROCEDURE — 91301 PR COVID VAC MODERNA 100 MCG/0.5 ML IM: CPT

## 2021-08-06 PROCEDURE — 0012A PR COVID VAC MODERNA 100 MCG/0.5 ML IM: CPT

## 2021-09-09 DIAGNOSIS — I10 HYPERTENSION GOAL BP (BLOOD PRESSURE) < 140/90: ICD-10-CM

## 2021-09-10 RX ORDER — LISINOPRIL 20 MG/1
TABLET ORAL
Qty: 90 TABLET | Refills: 0 | Status: SHIPPED | OUTPATIENT
Start: 2021-09-10 | End: 2022-03-15

## 2021-09-10 NOTE — TELEPHONE ENCOUNTER
Routing refill request to provider for review/approval because:  Labs not current:    Creatinine   Date Value Ref Range Status   06/05/2019 0.66 0.52 - 1.04 mg/dL Final     Potassium   Date Value Ref Range Status   06/05/2019 4.0 3.4 - 5.3 mmol/L Final     Sodium   Date Value Ref Range Status   06/05/2019 140 133 - 144 mmol/L Final     Iris Su RN

## 2021-12-14 NOTE — TELEPHONE ENCOUNTER
"Routing refill request to provider for review/approval because:  Requested Prescriptions   Pending Prescriptions Disp Refills    metFORMIN (GLUCOPHAGE) 500 MG tablet [Pharmacy Med Name: METFORMIN  MG TABLET] 90 tablet 0     Sig: Take 1 tablet (500 mg) by mouth daily (with breakfast) Needs to be seen for further refills       Biguanide Agents Failed - 6/4/2021 12:08 AM        Failed - Patient has documented A1c within the specified period of time.     If HgbA1C is 8 or greater, it needs to be on file within the past 3 months.  If less than 8, must be on file within the past 6 months.     Recent Labs   Lab Test 01/08/19  1037   A1C 6.1*             Failed - Patient's CR is NOT>1.4 OR Patient's EGFR is NOT<45 within past 12 mos.     Recent Labs   Lab Test 06/05/19  1109   GFRESTIMATED >90   GFRESTBLACK >90       Recent Labs   Lab Test 06/05/19  1109   CR 0.66             Passed - Patient is age 10 or older        Passed - Patient does NOT have a diagnosis of CHF.        Passed - Medication is active on med list        Passed - Patient is not pregnant        Passed - Patient has not had a positive pregnancy test within the past 12 mos.         Passed - Recent (6 mo) or future (30 days) visit within the authorizing provider's specialty     Patient had office visit in the last 6 months or has a visit in the next 30 days with authorizing provider or within the authorizing provider's specialty.  See \"Patient Info\" tab in inbasket, or \"Choose Columns\" in Meds & Orders section of the refill encounter.                 Mariam ANTHONY, RN          " denies

## 2021-12-16 ENCOUNTER — OFFICE VISIT (OUTPATIENT)
Dept: FAMILY MEDICINE | Facility: CLINIC | Age: 66
End: 2021-12-16
Payer: COMMERCIAL

## 2021-12-16 VITALS
HEART RATE: 65 BPM | DIASTOLIC BLOOD PRESSURE: 69 MMHG | SYSTOLIC BLOOD PRESSURE: 158 MMHG | RESPIRATION RATE: 18 BRPM | OXYGEN SATURATION: 99 % | WEIGHT: 211 LBS | TEMPERATURE: 97.9 F | BODY MASS INDEX: 38.59 KG/M2

## 2021-12-16 DIAGNOSIS — N64.4 BREAST PAIN, LEFT: Primary | ICD-10-CM

## 2021-12-16 DIAGNOSIS — E66.01 MORBID OBESITY (H): ICD-10-CM

## 2021-12-16 PROCEDURE — 99213 OFFICE O/P EST LOW 20 MIN: CPT | Performed by: FAMILY MEDICINE

## 2021-12-16 ASSESSMENT — PAIN SCALES - GENERAL: PAINLEVEL: NO PAIN (0)

## 2021-12-16 NOTE — NURSING NOTE
"Chief Complaint   Patient presents with     Breast Pain     left, comes and goes. on going     Numbness     right hand fingers       Initial BP (!) 158/69   Pulse 65   Temp 97.9  F (36.6  C) (Tympanic)   Resp 18   Wt 95.7 kg (211 lb)   SpO2 99%   BMI 38.59 kg/m   Estimated body mass index is 38.59 kg/m  as calculated from the following:    Height as of 3/10/21: 1.575 m (5' 2\").    Weight as of this encounter: 95.7 kg (211 lb).  Medication Reconciliation: complete  Lesa Michael CMA  "

## 2021-12-16 NOTE — PROGRESS NOTES
"SUBJECTIVE:   Melissa Coronado is a 66 year old  female who presents with complaint of left breast pain  The patient denies redness or discharge in this area.     She reports that she started having pain 6-7 years ago   She describes it as a \"slight pain\"     It lasts a minute or so   It ocurrs about 2 times a month(s)       She denies  Discharge from the nipple            History reviewed. No pertinent past medical history.  Family History   Problem Relation Age of Onset     Hernia Brother      Other - See Comments Son         suffers from sneezing disorder     Current Outpatient Medications   Medication Sig Dispense Refill     Acetaminophen (TYLENOL EXTRA STRENGTH PO) Take 500 mg by mouth Reported on 4/13/2017       lisinopril (ZESTRIL) 20 MG tablet TAKE 1 TABLET BY MOUTH EVERY DAY 90 tablet 0     meloxicam (MOBIC) 15 MG tablet Take 1 tablet (15 mg) by mouth daily as needed 30 tablet 1     metFORMIN (GLUCOPHAGE) 500 MG tablet TAKE 1 TABLET (500 MG) BY MOUTH DAILY (WITH BREAKFAST) NEEDS TO BE SEEN FOR FURTHER REFILLS 90 tablet 0     nabumetone (RELAFEN) 750 MG tablet Take 1 tablet (750 mg) by mouth 2 times daily 180 tablet 1     pyridOXINE (VITAMIN B-6) 50 MG tablet Take 1 tablet (50 mg) by mouth daily 90 tablet 3                OBJECTIVE:  BP (!) 158/69   Pulse 65   Temp 97.9  F (36.6  C) (Tympanic)   Resp 18   Wt 95.7 kg (211 lb)   SpO2 99%   BMI 38.59 kg/m     Lungs: clear to auscultation  Heart:  regular rate and rythm without murmur.  Right Breast and Axilla:  symmetrical  without lesions  no palpable mass  no nipple discharge      Left Breast and Axilla:  symmetrical  without lesions  no palpable mass  no nipple discharge        GENERAL APPEARANCE: healthy and alert and no distress            ASSESSMENT/ PLAN:  No diagnosis found.      The patient was instructed to call [facility] [phone]  to schedule her imaging tests      -Left or Right Diagnostic Mammogram  -Left Breast ultrasound " ([id number])  -Right Breast ultrasound  ([id number])

## 2022-01-04 ENCOUNTER — ANCILLARY PROCEDURE (OUTPATIENT)
Dept: MAMMOGRAPHY | Facility: CLINIC | Age: 67
End: 2022-01-04
Attending: FAMILY MEDICINE
Payer: COMMERCIAL

## 2022-01-04 ENCOUNTER — ANCILLARY PROCEDURE (OUTPATIENT)
Dept: ULTRASOUND IMAGING | Facility: CLINIC | Age: 67
End: 2022-01-04
Attending: FAMILY MEDICINE
Payer: COMMERCIAL

## 2022-01-04 DIAGNOSIS — N64.4 BREAST PAIN, LEFT: ICD-10-CM

## 2022-01-04 PROCEDURE — 77066 DX MAMMO INCL CAD BI: CPT | Performed by: RADIOLOGY

## 2022-01-04 PROCEDURE — 76642 ULTRASOUND BREAST LIMITED: CPT | Mod: LT | Performed by: RADIOLOGY

## 2022-01-04 PROCEDURE — G0279 TOMOSYNTHESIS, MAMMO: HCPCS | Performed by: RADIOLOGY

## 2022-01-04 NOTE — RESULT ENCOUNTER NOTE
Melissa,  I have reviewed the report of the imaging test or tests that we recently ordered. The results were normal or considered normal for you. There were no abnormalities on either the mammogram or the ultrasound. If you feel that you need further attention or treatment of this symptom(s) I would recommend(ed) that you see one of our gynecologists for an opinion. We have Melody Sierra Nurse Practicioner and Dr Jurado here at [place].     Sincerely,   Juanjo Engel MD

## 2022-01-04 NOTE — LETTER
January 5, 2022      Melissa Coronado  [address]  [place] [address]        Melissa,   I have reviewed the report of the imaging test or tests that we recently ordered. The results were normal or considered normal for you. There were no abnormalities on either the mammogram or the ultrasound. If you feel that you need further attention or treatment of this symptom(s) I would recommend(ed) that you see one of our gynecologists for an opinion. We have Melody Sierra Nurse Practicioner and Dr Jurado here at [place].     Sincerely,   Juanjo Engel MD       Resulted Orders   MA Diagnostic Bilateral w/Isaias    Narrative    Examinations: MA DIAGNOSTIC BILATERAL W/ ISAIAS, US BREAST LEFT LIMITED  1-3 QUADRANTS, 1/4/2022 8:50 AM    Comparisons: 1/20/2020, 1/17/2019, 4/13/2017, 5/11/2016    History: Intermittent left breast pain    BREAST DENSITY: Heterogeneously dense.    Findings:     Mammogram:  Technique: Standard mammographic views were performed with  tomosynthesis and 2D reconstruction.  No suspicious findings.    Ultrasound:  Real-time targeted technologist and physician performed sonographic  imaging of the left breast.  At the 1:00 position 18 cm from the nipple at the site of pain, no  suspicious sonographic finding.      Impression    IMPRESSION: BI-RADS CATEGORY: 1 -  Negative.    RECOMMENDED FOLLOW-UP: Annual Mammography.  Given the lack of imaging findings, further management of breast pain  should be based on clinical grounds.      The patient was given the results of the examination.    RENEA QUINTERO MD         SYSTEM ID:  [id number]

## 2022-03-12 DIAGNOSIS — I10 HYPERTENSION GOAL BP (BLOOD PRESSURE) < 140/90: ICD-10-CM

## 2022-03-12 NOTE — LETTER
March 15, 2022    Melissa Coronado  [address]  [place] [address]    Dear Melissa,       We recently received a refill request for lisinopril (ZESTRIL) 20 MG tablet.  We have refilled this for a one time 30 day supply only because you are due for a:    Blood pressure/medication check office visit      Please call at your earliest convenience so that there will not be a delay with your future refills.          Thank you,   Your [facility] Team/  [phone]

## 2022-03-14 NOTE — TELEPHONE ENCOUNTER
"Routing refill request to provider for review/approval because:  Requested Prescriptions   Pending Prescriptions Disp Refills    lisinopril (ZESTRIL) 20 MG tablet [Pharmacy Med Name: LISINOPRIL 20 MG TABLET] 90 tablet 0     Sig: TAKE 1 TABLET BY MOUTH EVERY DAY        ACE Inhibitors (Including Combos) Protocol Failed - 3/12/2022  4:12 PM        Failed - Blood pressure under 140/90 in past 12 months       BP Readings from Last 3 Encounters:   12/16/21 (!) 158/69   03/10/21 138/80   09/29/20 (!) 142/72                 Failed - Normal serum creatinine on file in past 12 months     Recent Labs   Lab Test 06/05/19  1109   CR 0.66       Ok to refill medication if creatinine is low          Failed - Normal serum potassium on file in past 12 months     Recent Labs   Lab Test 06/05/19  1109   POTASSIUM 4.0               Passed - Recent (12 mo) or future (30 days) visit within the authorizing provider's specialty     Patient has had an office visit with the authorizing provider or a provider within the authorizing providers department within the previous 12 mos or has a future within next 30 days. See \"Patient Info\" tab in inbasket, or \"Choose Columns\" in Meds & Orders section of the refill encounter.              Passed - Medication is active on med list        Passed - Patient is age 18 or older        Passed - No active pregnancy on record        Passed - No positive pregnancy test within past 12 months                Mariam ANTHONY, RN        "

## 2022-03-15 RX ORDER — LISINOPRIL 20 MG/1
TABLET ORAL
Qty: 30 TABLET | Refills: 0 | Status: SHIPPED | OUTPATIENT
Start: 2022-03-15 | End: 2022-04-11

## 2022-04-06 NOTE — LETTER
March 23, 2018    Melissa Coronado  [address]  [place] [address]            Dear Melissa,     Your stool test shows some blood. We recommend a colonoscopy in such cases. Please call [phone] to schedule the colonoscopy.    Please use Face[employer] or call our clinic at [phone] if you have any questions.       Marina Crandall MD[facility]      Results for orders placed or performed in visit on 03/20/18   Fecal colorectal cancer screen (FIT)   Result Value Ref Range    Occult Blood Scn FIT Positive (A) NEG^Negative                     
English

## 2022-04-10 DIAGNOSIS — I10 HYPERTENSION GOAL BP (BLOOD PRESSURE) < 140/90: ICD-10-CM

## 2022-04-11 RX ORDER — LISINOPRIL 20 MG/1
TABLET ORAL
Qty: 30 TABLET | Refills: 0 | Status: SHIPPED | OUTPATIENT
Start: 2022-04-11 | End: 2023-11-17

## 2022-04-11 NOTE — TELEPHONE ENCOUNTER
Routing refill request to provider for review/approval because:  Labs not current:  DANILO MEJIA [place] RN

## 2022-04-26 ENCOUNTER — TELEPHONE (OUTPATIENT)
Dept: FAMILY MEDICINE | Facility: CLINIC | Age: 67
End: 2022-04-26
Payer: COMMERCIAL

## 2022-04-26 DIAGNOSIS — I10 HYPERTENSION GOAL BP (BLOOD PRESSURE) < 140/90: ICD-10-CM

## 2022-04-26 RX ORDER — LISINOPRIL 20 MG/1
20 TABLET ORAL DAILY
Qty: 30 TABLET | Refills: 0 | OUTPATIENT
Start: 2022-04-26

## 2022-06-01 ENCOUNTER — TRANSFERRED RECORDS (OUTPATIENT)
Dept: HEALTH INFORMATION MANAGEMENT | Facility: CLINIC | Age: 67
End: 2022-06-01
Payer: COMMERCIAL

## 2022-06-08 PROBLEM — I63.311 CEREBROVASCULAR ACCIDENT (CVA) DUE TO THROMBOSIS OF RIGHT MIDDLE CEREBRAL ARTERY (H): Status: ACTIVE | Noted: 2022-06-08

## 2022-07-08 ENCOUNTER — TRANSFERRED RECORDS (OUTPATIENT)
Dept: FAMILY MEDICINE | Facility: CLINIC | Age: 67
End: 2022-07-08

## 2022-09-02 ENCOUNTER — TRANSFERRED RECORDS (OUTPATIENT)
Dept: HEALTH INFORMATION MANAGEMENT | Facility: CLINIC | Age: 67
End: 2022-09-02

## 2022-10-03 NOTE — PROGRESS NOTES
"   SUBJECTIVE:   CC: Melissa is an 67 year old who presents for preventive health visit.       Patient has been advised of split billing requirements and indicates understanding: Yes  Healthy Habits:     In general, how would you rate your overall health?  Fair    Frequency of exercise:  2-3 days/week    Duration of exercise:  30-45 minutes    Do you usually eat at least 4 servings of fruit and vegetables a day, include whole grains    & fiber and avoid regularly eating high fat or \"junk\" foods?  Yes    Taking medications regularly:  Yes    Medication side effects:  None    Ability to successfully perform activities of daily living:  No assistance needed    Home Safety:  No safety concerns identified    Hearing Impairment:  No hearing concerns    In the past 6 months, have you been bothered by leaking of urine?  No    In general, how would you rate your overall mental or emotional health?  Fair      PHQ-2 Total Score: 0    Additional concerns today:  No    CVA:  Had stroke 04/07/2022   presents with a 3-day hx of left-sided RLE>RUE weakness and then a mechanical fall. In the ED, bp up to 229/86, HR 50-60s, CTA head revealed  moderate stenosis of the M2 branch of the left middle cerebral artery and High-grade stenosis of the P1 segment of the left posterior cerebral artery.  MRI revealed Acute/subacute infarct adjacent to the body of the right lateral ventricle. Neurology was consulted, started pt on  to continue and clopidogrel 75 mg x 21 days only  Residual left side weakness       Hypertension:  Norvasc 5 mg   Lisinopril 20 mg     Prediabetes     Lab Results   Component Value Date    A1C 6.1 01/08/2019    A1C 6.1 03/16/2018     Hyperlipidemia   on Atorvastatin 40 mg daily  - Goal LDL <70     Preventive -    Immunization History   Administered Date(s) Administered     COVID-19,PF,Moderna 07/08/2021, 08/06/2021, 04/28/2022     Influenza Vaccine IM > 6 months Valent IIV4 (Alfuria,Fluzone) 01/18/2018, 01/08/2019 "     Influenza, Quad, High Dose, Pf, 65yr+ (Fluzone HD) 09/29/2020     Mantoux Tuberculin Skin Test 03/20/2018     Pneumococcal 20 valent Conjugate (Prevnar 20) 05/01/2022     TDAP Vaccine (Adacel) 03/22/2017     Flu vaccine today       -Mammogram:   Had Mammogram 01/2022   Ultrasound:  Real-time targeted technologist and physician performed sonographic  imaging of the left breast.  At the 1:00 position 18 cm from the nipple at the site of pain, no  suspicious sonographic finding.                                                                      IMPRESSION: BI-RADS CATEGORY: 1 -  Negative.     RECOMMENDED FOLLOW-UP: Annual Mammography.  Given the lack of imaging findings, further management of breast pain  should be based on clinical grounds.      Lab Results   Component Value Date    PAP NIL 03/22/2017       - Colon CA screen: Colonoscopy, age 45-75 every 10 years or FIT every year or Cologuard every 3 years   FIT test today     -lipids screen: ordered     Diabetes screen: ordered         Today's PHQ-2 Score:   PHQ-2 ( 1999 Pfizer) 10/4/2022   Q1: Little interest or pleasure in doing things 0   Q2: Feeling down, depressed or hopeless 0   PHQ-2 Score 0   PHQ-2 Total Score (12-17 Years)- Positive if 3 or more points; Administer PHQ-A if positive -   Q1: Little interest or pleasure in doing things Not at all   Q2: Feeling down, depressed or hopeless Not at all   PHQ-2 Score 0       Abuse: Current or Past (Physical, Sexual or Emotional) - No  Do you feel safe in your environment? Yes    Have you ever done Advance Care Planning? (For example, a Health Directive, POLST, or a discussion with a medical provider or your loved ones about your wishes): No, advance care planning information given to patient to review.  Patient plans to discuss their wishes with loved ones or provider.      Social History     Tobacco Use     Smoking status: Never Smoker     Smokeless tobacco: Never Used   Substance Use Topics     Alcohol use:  Yes     Comment: small amt     If you drink alcohol do you typically have >3 drinks per day or >7 drinks per week? No    Alcohol Use 10/4/2022   Prescreen: >3 drinks/day or >7 drinks/week? Not Applicable   Prescreen: >3 drinks/day or >7 drinks/week? -   No flowsheet data found.    Reviewed orders with patient.  Reviewed health maintenance and updated orders accordingly - Yes  Lab work is in process  Labs reviewed in EPIC  BP Readings from Last 3 Encounters:   10/04/22 131/74   12/16/21 (!) 158/69   03/10/21 138/80    Wt Readings from Last 3 Encounters:   10/04/22 84.4 kg (186 lb)   12/16/21 95.7 kg (211 lb)   03/10/21 101.2 kg (223 lb)                  Patient Active Problem List   Diagnosis     Hypertension goal BP (blood pressure) < 140/90     Morbid obesity (H)     Latent tuberculosis by blood test     Prediabetes     Cerebrovascular accident (CVA) due to thrombosis of right middle cerebral artery (H)     History reviewed. No pertinent surgical history.    Social History     Tobacco Use     Smoking status: Never Smoker     Smokeless tobacco: Never Used   Substance Use Topics     Alcohol use: Yes     Comment: small amt     Family History   Problem Relation Age of Onset     Hernia Brother      Other - See Comments Son         suffers from sneezing disorder         Current Outpatient Medications   Medication Sig Dispense Refill     Acetaminophen (TYLENOL EXTRA STRENGTH PO) Take 500 mg by mouth Reported on 4/13/2017       amLODIPine (NORVASC) 5 MG tablet Take 5 mg by mouth       aspirin (ASA) 325 MG tablet        atorvastatin (LIPITOR) 40 MG tablet Take 40 mg by mouth       diclofenac (VOLTAREN) 1 % topical gel Apply 2 g topically       dorzolamide-timolol PF (COSOPT PF) 2-0.5 % opthalmic solutionh        enoxaparin ANTICOAGULANT (LOVENOX) 40 MG/0.4ML syringe Inject 40 mg Subcutaneous       ergocalciferol (ERGOCALCIFEROL) 1.25 MG (74821 UT) capsule Take 50,000 Units by mouth       latanoprost (XALATAN) 0.005 %  ophthalmic solution Apply 1 drop to eye At Bedtime       lisinopril (ZESTRIL) 20 MG tablet Take 20 mg by mouth       lisinopril (ZESTRIL) 20 MG tablet TAKE 1 TABLET BY MOUTH EVERY DAY 30 tablet 0     meloxicam (MOBIC) 15 MG tablet Take 1 tablet (15 mg) by mouth daily as needed 30 tablet 1     metFORMIN (GLUCOPHAGE) 500 MG tablet TAKE 1 TABLET (500 MG) BY MOUTH DAILY (WITH BREAKFAST) NEEDS TO BE SEEN FOR FURTHER REFILLS 90 tablet 0     mineral oil-hydrophilic petrolatum (AQUAPHOR) external ointment        nabumetone (RELAFEN) 750 MG tablet Take 1 tablet (750 mg) by mouth 2 times daily 180 tablet 1     naproxen (NAPROSYN) 500 MG tablet Take 500 mg by mouth       polyethylene glycol (MIRALAX) 17 GM/Dose powder Take 17 g by mouth       pyridOXINE (VITAMIN B-6) 50 MG tablet Take 1 tablet (50 mg) by mouth daily 90 tablet 3     senna-docusate (SENOKOT-S/PERICOLACE) 8.6-50 MG tablet Take 2 tablets by mouth       tiZANidine (ZANAFLEX) 4 MG tablet Take 4 mg by mouth       Allergies   Allergen Reactions     Sodium      Recent Labs   Lab Test 06/05/19  1109 01/08/19  1037 08/03/18  1035 04/30/18  1106 04/30/18  1106 03/16/18  1212 01/18/18  0955 08/21/17  1022 03/22/17  1035   A1C  --  6.1*  --   --   --  6.1*  --   --   --    LDL  --   --   --   --  120*  --   --   --  136*   HDL  --   --   --   --   --   --   --   --  75   TRIG  --   --   --   --   --   --   --   --  105   ALT 16 19 23   < > 18  --   --   --   --    CR 0.66 0.68 0.71  --   --  0.68 0.74   < > 0.83   GFRESTIMATED >90 >90 82  --   --  87 79   < > 70   GFRESTBLACK >90 >90 >90  --   --  >90 >90   < > 84   POTASSIUM 4.0 4.4 3.8  --   --  3.8 4.1   < > 3.3*   TSH  --   --   --   --   --   --  1.22  --   --     < > = values in this interval not displayed.        Breast Cancer Screening:    Breast CA Risk Assessment (FHS-7) 10/4/2022   Do you have a family history of breast, colon, or ovarian cancer? No / Unknown         Mammogram Screening: Recommended mammography  "every 1-2 years with patient discussion and risk factor consideration  Pertinent mammograms are reviewed under the imaging tab.      PAP / HPV Latest Ref Rng & Units 3/22/2017   PAP (Historical) - NIL   HPV16 NEG Negative   HPV18 NEG Negative   HRHPV NEG Negative     Reviewed and updated as needed this visit by clinical staff   Tobacco  Allergies    Med Hx  Surg Hx  Fam Hx  Soc Hx          Reviewed and updated as needed this visit by Provider                   History reviewed. No pertinent past medical history.   History reviewed. No pertinent surgical history.  OB History   No obstetric history on file.       Review of Systems   Constitutional: Negative for chills and fever.   HENT: Negative for congestion, ear pain, hearing loss and sore throat.    Eyes: Negative for pain and visual disturbance.   Respiratory: Negative for cough and shortness of breath.    Cardiovascular: Negative for chest pain, palpitations and peripheral edema.   Gastrointestinal: Positive for constipation. Negative for abdominal pain, diarrhea, hematochezia and nausea.   Genitourinary: Positive for frequency. Negative for dysuria, genital sores, hematuria and urgency.   Musculoskeletal: Positive for arthralgias. Negative for joint swelling and myalgias.   Skin: Negative for rash.   Neurological: Positive for headaches. Negative for dizziness, weakness and paresthesias.   Psychiatric/Behavioral: Negative for mood changes. The patient is not nervous/anxious.           OBJECTIVE:   /74 (BP Location: Right arm, Patient Position: Sitting, Cuff Size: Adult Regular)   Pulse 65   Temp 98.6  F (37  C) (Tympanic)   Ht 1.575 m (5' 2\")   Wt 84.4 kg (186 lb)   LMP  (LMP Unknown)   SpO2 95%   BMI 34.02 kg/m    Physical Exam  Vitals and nursing note reviewed.   Constitutional:       General: She is not in acute distress.     Appearance: Normal appearance. She is not ill-appearing, toxic-appearing or diaphoretic.   HENT:      Head: " Normocephalic and atraumatic.   Cardiovascular:      Rate and Rhythm: Normal rate and regular rhythm.      Pulses: Normal pulses.      Heart sounds: Normal heart sounds. No murmur heard.    No friction rub. No gallop.   Pulmonary:      Effort: Pulmonary effort is normal. No respiratory distress.      Breath sounds: Normal breath sounds. No stridor. No wheezing, rhonchi or rales.   Chest:      Chest wall: No tenderness.   Skin:     General: Skin is warm.      Capillary Refill: Capillary refill takes less than 2 seconds.      Coloration: Skin is not jaundiced.      Findings: No bruising.   Neurological:      Cranial Nerves: No cranial nerve deficit.      Sensory: Sensory deficit present.      Motor: Weakness present.      Gait: Gait abnormal.      Comments: Left side weakness          ASSESSMENT/PLAN:   (Z00.00) Routine general medical examination at a health care facility  (primary encounter diagnosis)  Comment: Preventive care reviewed and updated.    Plan: Lipid panel reflex to direct LDL Non-fasting,         Comprehensive metabolic panel (BMP + Alb, Alk         Phos, ALT, AST, Total. Bili, TP), Hemoglobin         A1c, CBC with platelets and differential  Patient will return to discuss knee pain and back pain   She will bring all her medication next visit for review   Advised her to continue physical therapy for   stroke   (Z12.11) Colon cancer screening  Declined colonoscopy   Plan: Fecal colorectal cancer screen FIT - Future         (S+30)      (Z23) Need for prophylactic vaccination and inoculation against influenza    Plan: INFLUENZA, QUAD, HIGH DOSE, PF, 65YR + (FLUZONE        HD)    Patient has been advised of split billing requirements and indicates understanding: Yes    COUNSELING:  Reviewed preventive health counseling, as reflected in patient instructions       Healthy diet/nutrition       Immunizations    Vaccinated for: Influenza          Estimated body mass index is 34.02 kg/m  as calculated from the  "following:    Height as of this encounter: 1.575 m (5' 2\").    Weight as of this encounter: 84.4 kg (186 lb).    Weight management plan: Discussed healthy diet and exercise guidelines    She reports that she has never smoked. She has never used smokeless tobacco.      Counseling Resources:  ATP IV Guidelines  Pooled Cohorts Equation Calculator  Breast Cancer Risk Calculator  BRCA-Related Cancer Risk Assessment: FHS-7 Tool  FRAX Risk Assessment  ICSI Preventive Guidelines  Dietary Guidelines for Americans, 2010  USDA's MyPlate  ASA Prophylaxis  Lung CA Screening    Esperanza Buck MD  [facility]  "

## 2022-10-04 ENCOUNTER — OFFICE VISIT (OUTPATIENT)
Dept: FAMILY MEDICINE | Facility: CLINIC | Age: 67
End: 2022-10-04
Payer: COMMERCIAL

## 2022-10-04 VITALS
OXYGEN SATURATION: 95 % | HEART RATE: 65 BPM | DIASTOLIC BLOOD PRESSURE: 74 MMHG | WEIGHT: 186 LBS | SYSTOLIC BLOOD PRESSURE: 131 MMHG | HEIGHT: 62 IN | TEMPERATURE: 98.6 F | BODY MASS INDEX: 34.23 KG/M2

## 2022-10-04 DIAGNOSIS — Z23 NEED FOR PROPHYLACTIC VACCINATION AND INOCULATION AGAINST INFLUENZA: ICD-10-CM

## 2022-10-04 DIAGNOSIS — Z12.11 COLON CANCER SCREENING: ICD-10-CM

## 2022-10-04 DIAGNOSIS — Z00.00 ROUTINE GENERAL MEDICAL EXAMINATION AT A HEALTH CARE FACILITY: Primary | ICD-10-CM

## 2022-10-04 LAB
BASOPHILS # BLD AUTO: 0 10E3/UL (ref 0–0.2)
BASOPHILS NFR BLD AUTO: 0 %
EOSINOPHIL # BLD AUTO: 0.1 10E3/UL (ref 0–0.7)
EOSINOPHIL NFR BLD AUTO: 1 %
ERYTHROCYTE [DISTWIDTH] IN BLOOD BY AUTOMATED COUNT: 14.1 % (ref 10–15)
HBA1C MFR BLD: 6 % (ref 0–5.6)
HCT VFR BLD AUTO: 40.6 % (ref 35–47)
HGB BLD-MCNC: 13.9 G/DL (ref 11.7–15.7)
LYMPHOCYTES # BLD AUTO: 2.9 10E3/UL (ref 0.8–5.3)
LYMPHOCYTES NFR BLD AUTO: 41 %
MCH RBC QN AUTO: 30 PG (ref 26.5–33)
MCHC RBC AUTO-ENTMCNC: 34.2 G/DL (ref 31.5–36.5)
MCV RBC AUTO: 88 FL (ref 78–100)
MONOCYTES # BLD AUTO: 0.6 10E3/UL (ref 0–1.3)
MONOCYTES NFR BLD AUTO: 8 %
NEUTROPHILS # BLD AUTO: 3.5 10E3/UL (ref 1.6–8.3)
NEUTROPHILS NFR BLD AUTO: 49 %
PLATELET # BLD AUTO: 241 10E3/UL (ref 150–450)
RBC # BLD AUTO: 4.64 10E6/UL (ref 3.8–5.2)
WBC # BLD AUTO: 7.1 10E3/UL (ref 4–11)

## 2022-10-04 PROCEDURE — 80053 COMPREHEN METABOLIC PANEL: CPT | Performed by: FAMILY MEDICINE

## 2022-10-04 PROCEDURE — 90471 IMMUNIZATION ADMIN: CPT | Performed by: FAMILY MEDICINE

## 2022-10-04 PROCEDURE — 36415 COLL VENOUS BLD VENIPUNCTURE: CPT | Performed by: FAMILY MEDICINE

## 2022-10-04 PROCEDURE — 85025 COMPLETE CBC W/AUTO DIFF WBC: CPT | Performed by: FAMILY MEDICINE

## 2022-10-04 PROCEDURE — 82274 ASSAY TEST FOR BLOOD FECAL: CPT | Performed by: FAMILY MEDICINE

## 2022-10-04 PROCEDURE — 99397 PER PM REEVAL EST PAT 65+ YR: CPT | Mod: 25 | Performed by: FAMILY MEDICINE

## 2022-10-04 PROCEDURE — 80061 LIPID PANEL: CPT | Performed by: FAMILY MEDICINE

## 2022-10-04 PROCEDURE — 90662 IIV NO PRSV INCREASED AG IM: CPT | Performed by: FAMILY MEDICINE

## 2022-10-04 PROCEDURE — 83036 HEMOGLOBIN GLYCOSYLATED A1C: CPT | Performed by: FAMILY MEDICINE

## 2022-10-04 RX ORDER — AMOXICILLIN 250 MG
2 CAPSULE ORAL
COMMUNITY
Start: 2022-05-10

## 2022-10-04 RX ORDER — NAPROXEN 500 MG/1
500 TABLET ORAL
COMMUNITY
Start: 2021-11-16

## 2022-10-04 RX ORDER — LATANOPROST 50 UG/ML
1 SOLUTION/ DROPS OPHTHALMIC AT BEDTIME
COMMUNITY
Start: 2022-01-19

## 2022-10-04 RX ORDER — LISINOPRIL 20 MG/1
20 TABLET ORAL
COMMUNITY
Start: 2022-04-11

## 2022-10-04 RX ORDER — DORZOLAMIDE HYDROCHLORIDE AND TIMOLOL MALEATE PRESERVATIVE FREE 20; 5 MG/ML; MG/ML
SOLUTION/ DROPS OPHTHALMIC
COMMUNITY
Start: 2022-09-07

## 2022-10-04 RX ORDER — POLYETHYLENE GLYCOL 3350 17 G/17G
17 POWDER, FOR SOLUTION ORAL
COMMUNITY
Start: 2022-05-10

## 2022-10-04 RX ORDER — MINERAL OIL/HYDROPHIL PETROLAT
OINTMENT (GRAM) TOPICAL
COMMUNITY
Start: 2022-07-21

## 2022-10-04 RX ORDER — ATORVASTATIN CALCIUM 40 MG/1
40 TABLET, FILM COATED ORAL
COMMUNITY
Start: 2022-05-10 | End: 2023-11-20

## 2022-10-04 RX ORDER — ASPIRIN 325 MG
TABLET ORAL
COMMUNITY
Start: 2022-09-14 | End: 2023-11-20

## 2022-10-04 RX ORDER — AMLODIPINE BESYLATE 5 MG/1
5 TABLET ORAL
COMMUNITY
Start: 2022-05-10 | End: 2023-11-17

## 2022-10-04 RX ORDER — ENOXAPARIN SODIUM 100 MG/ML
40 INJECTION SUBCUTANEOUS
COMMUNITY
Start: 2022-05-11

## 2022-10-04 RX ORDER — ERGOCALCIFEROL 1.25 MG/1
50000 CAPSULE ORAL
COMMUNITY
Start: 2022-05-05

## 2022-10-04 ASSESSMENT — ENCOUNTER SYMPTOMS
ARTHRALGIAS: 1
DYSURIA: 0
CHILLS: 0
NAUSEA: 0
ABDOMINAL PAIN: 0
HEMATOCHEZIA: 0
HEADACHES: 1
SHORTNESS OF BREATH: 0
HEMATURIA: 0
FEVER: 0
DIARRHEA: 0
DIZZINESS: 0
SORE THROAT: 0
EYE PAIN: 0
JOINT SWELLING: 0
PALPITATIONS: 0
CONSTIPATION: 1
MYALGIAS: 0
PARESTHESIAS: 0
WEAKNESS: 0
COUGH: 0
FREQUENCY: 1
NERVOUS/ANXIOUS: 0

## 2022-10-04 ASSESSMENT — ACTIVITIES OF DAILY LIVING (ADL): CURRENT_FUNCTION: NO ASSISTANCE NEEDED

## 2022-10-04 ASSESSMENT — PAIN SCALES - GENERAL: PAINLEVEL: NO PAIN (0)

## 2022-10-04 NOTE — PATIENT INSTRUCTIONS
Preventive Health Recommendations    See your health care provider every year to    Review health changes.     Discuss preventive care.      Review your medicines if your doctor has prescribed any.      You no longer need a yearly Pap test unless you've had an abnormal Pap test in the past 10 years. If you have vaginal symptoms, such as bleeding or discharge, be sure to talk with your provider about a Pap test.      Every 1 to 2 years, have a mammogram.  If you are over 69, talk with your health care provider about whether or not you want to continue having screening mammograms.      Every 10 years, have a colonoscopy. Or, have a yearly FIT test (stool test). These exams will check for colon cancer.       Have a cholesterol test every 5 years, or more often if your doctor advises it.       Have a diabetes test (fasting glucose) every three years. If you are at risk for diabetes, you should have this test more often.       At age 65, have a bone density scan (DEXA) to check for osteoporosis (brittle bone disease).    Shots:    Get a flu shot each year.    Get a tetanus shot every 10 years.    Talk to your doctor about your pneumonia vaccines. There are now two you should receive - Pneumovax (PPSV 23) and Prevnar (PCV 13).    Talk to your pharmacist about the shingles vaccine.    Talk to your doctor about the hepatitis B vaccine.    Nutrition:     Eat at least 5 servings of fruits and vegetables each day.      Eat whole-grain bread, whole-wheat pasta and brown rice instead of white grains and rice.      Get adequate about Calcium and Vitamin D.     Lifestyle    Exercise at least 150 minutes a week (30 minutes a day, 5 days a week). This will help you control your weight and prevent disease.      Limit alcohol to one drink per day.      No smoking.       Wear sunscreen to prevent skin cancer.       See your dentist twice a year for an exam and cleaning.      See your eye doctor every 1 to 2 years to screen for  conditions such as glaucoma, macular degeneration, cataracts, etc.    Personalized Prevention Plan  You are due for the preventive services outlined below.  Your care team is available to assist you in scheduling these services.  If you have already completed any of these items, please share that information with your care team to update in your medical record.    Health Maintenance Due   Topic Date Due     Osteoporosis Screening  Never done     ANNUAL REVIEW OF HM ORDERS  Never done     Zoster (Shingles) Vaccine (1 of 2) Never done     Cholesterol Lab  03/22/2018     Colorectal Cancer Screening  03/19/2019     COVID-19 Vaccine (4 - Booster for Moderna series) 06/23/2022     Discuss Advance Care Planning  08/21/2022     Flu Vaccine (1) 09/01/2022

## 2022-10-04 NOTE — LETTER
October 5, 2022      Melissa Coronado  [address]  [place] [address]        Hi Melissa,   Labs look o   Please continue with the plan as we discussed in the clinic.   Feel free to contact the clinic with any questions or concerns. Thank you for allowing us to participate in your care.     Esperanza Buck MD.       Resulted Orders   Lipid panel reflex to direct LDL Non-fasting   Result Value Ref Range    Cholesterol 138 <200 mg/dL    Triglycerides 87 <150 mg/dL    Direct Measure HDL 67 >=50 mg/dL    LDL Cholesterol Calculated 54 <=100 mg/dL    Non HDL Cholesterol 71 <130 mg/dL    Patient Fasting > 8hrs? No     Narrative    Cholesterol  Desirable:  <200 mg/dL    Triglycerides  Normal:  Less than 150 mg/dL  Borderline High:  150-199 mg/dL  High:  200-499 mg/dL  Very High:  Greater than or equal to 500 mg/dL    Direct Measure HDL  Female:  Greater than or equal to 50 mg/dL   Male:  Greater than or equal to 40 mg/dL    LDL Cholesterol  Desirable:  <100mg/dL  Above Desirable:  100-129 mg/dL   Borderline High:  130-159 mg/dL   High:  160-189 mg/dL   Very High:  >= 190 mg/dL    Non HDL Cholesterol  Desirable:  130 mg/dL  Above Desirable:  130-159 mg/dL  Borderline High:  160-189 mg/dL  High:  190-219 mg/dL  Very High:  Greater than or equal to 220 mg/dL   Comprehensive metabolic panel (BMP + Alb, Alk Phos, ALT, AST, Total. Bili, TP)   Result Value Ref Range    Sodium 139 133 - 144 mmol/L    Potassium 3.9 3.4 - 5.3 mmol/L    Chloride 107 94 - 109 mmol/L    Carbon Dioxide (CO2) 28 20 - 32 mmol/L    Anion Gap 4 3 - 14 mmol/L    Urea Nitrogen 12 7 - 30 mg/dL    Creatinine 0.62 0.52 - 1.04 mg/dL    Calcium 9.5 8.5 - 10.1 mg/dL    Glucose 144 (H) 70 - 99 mg/dL    Alkaline Phosphatase 94 40 - 150 U/L    AST 33 0 - 45 U/L    ALT 45 0 - 50 U/L    Protein Total 7.5 6.8 - 8.8 g/dL    Albumin 3.6 3.4 - 5.0 g/dL    Bilirubin Total 0.5 0.2 - 1.3 mg/dL    GFR Estimate >90 >60 mL/min/1.73m2      Comment:      Effective December 21,  2021 eGFRcr in adults is calculated using the 2021 CKD-EPI creatinine equation which includes age and gender (Ly velez al., [place], DOI: 10.1056/PYMQvu9963078)   Hemoglobin A1c   Result Value Ref Range    Hemoglobin A1C 6.0 (H) 0.0 - 5.6 %      Comment:      Normal <5.7%   Prediabetes 5.7-6.4%    Diabetes 6.5% or higher     Note: Adopted from ADA consensus guidelines.   CBC with platelets and differential   Result Value Ref Range    WBC Count 7.1 4.0 - 11.0 10e3/uL    RBC Count 4.64 3.80 - 5.20 10e6/uL    Hemoglobin 13.9 11.7 - 15.7 g/dL    Hematocrit 40.6 35.0 - 47.0 %    MCV 88 78 - 100 fL    MCH 30.0 26.5 - 33.0 pg    MCHC 34.2 31.5 - 36.5 g/dL    RDW 14.1 10.0 - 15.0 %    Platelet Count 241 150 - 450 10e3/uL    % Neutrophils 49 %    % Lymphocytes 41 %    % Monocytes 8 %    % Eosinophils 1 %    % Basophils 0 %    Absolute Neutrophils 3.5 1.6 - 8.3 10e3/uL    Absolute Lymphocytes 2.9 0.8 - 5.3 10e3/uL    Absolute Monocytes 0.6 0.0 - 1.3 10e3/uL    Absolute Eosinophils 0.1 0.0 - 0.7 10e3/uL    Absolute Basophils 0.0 0.0 - 0.2 10e3/uL

## 2022-10-05 LAB
ALBUMIN SERPL-MCNC: 3.6 G/DL (ref 3.4–5)
ALP SERPL-CCNC: 94 U/L (ref 40–150)
ALT SERPL W P-5'-P-CCNC: 45 U/L (ref 0–50)
ANION GAP SERPL CALCULATED.3IONS-SCNC: 4 MMOL/L (ref 3–14)
AST SERPL W P-5'-P-CCNC: 33 U/L (ref 0–45)
BILIRUB SERPL-MCNC: 0.5 MG/DL (ref 0.2–1.3)
BUN SERPL-MCNC: 12 MG/DL (ref 7–30)
CALCIUM SERPL-MCNC: 9.5 MG/DL (ref 8.5–10.1)
CHLORIDE BLD-SCNC: 107 MMOL/L (ref 94–109)
CHOLEST SERPL-MCNC: 138 MG/DL
CO2 SERPL-SCNC: 28 MMOL/L (ref 20–32)
CREAT SERPL-MCNC: 0.62 MG/DL (ref 0.52–1.04)
FASTING STATUS PATIENT QL REPORTED: NO
GFR SERPL CREATININE-BSD FRML MDRD: >90 ML/MIN/1.73M2
GLUCOSE BLD-MCNC: 144 MG/DL (ref 70–99)
HDLC SERPL-MCNC: 67 MG/DL
LDLC SERPL CALC-MCNC: 54 MG/DL
NONHDLC SERPL-MCNC: 71 MG/DL
POTASSIUM BLD-SCNC: 3.9 MMOL/L (ref 3.4–5.3)
PROT SERPL-MCNC: 7.5 G/DL (ref 6.8–8.8)
SODIUM SERPL-SCNC: 139 MMOL/L (ref 133–144)
TRIGL SERPL-MCNC: 87 MG/DL

## 2022-10-08 LAB — HEMOCCULT STL QL IA: NEGATIVE

## 2023-11-06 ENCOUNTER — OFFICE VISIT (OUTPATIENT)
Dept: FAMILY MEDICINE | Facility: CLINIC | Age: 68
End: 2023-11-06
Payer: COMMERCIAL

## 2023-11-06 VITALS
OXYGEN SATURATION: 97 % | RESPIRATION RATE: 16 BRPM | DIASTOLIC BLOOD PRESSURE: 72 MMHG | HEIGHT: 62 IN | TEMPERATURE: 98.6 F | HEART RATE: 61 BPM | SYSTOLIC BLOOD PRESSURE: 124 MMHG | BODY MASS INDEX: 37.73 KG/M2 | WEIGHT: 205 LBS

## 2023-11-06 DIAGNOSIS — Z00.00 ROUTINE GENERAL MEDICAL EXAMINATION AT A HEALTH CARE FACILITY: Primary | ICD-10-CM

## 2023-11-06 DIAGNOSIS — Z12.11 COLON CANCER SCREENING: ICD-10-CM

## 2023-11-06 DIAGNOSIS — L85.3 DRY SKIN: ICD-10-CM

## 2023-11-06 PROCEDURE — 99213 OFFICE O/P EST LOW 20 MIN: CPT | Mod: 25 | Performed by: FAMILY MEDICINE

## 2023-11-06 PROCEDURE — 99397 PER PM REEVAL EST PAT 65+ YR: CPT | Performed by: FAMILY MEDICINE

## 2023-11-06 RX ORDER — AMMONIUM LACTATE 12 G/100G
CREAM TOPICAL 2 TIMES DAILY
Qty: 385 G | Refills: 0 | Status: SHIPPED | OUTPATIENT
Start: 2023-11-06

## 2023-11-06 ASSESSMENT — ENCOUNTER SYMPTOMS
NAUSEA: 0
DIARRHEA: 0
BREAST MASS: 0
FREQUENCY: 1
COUGH: 0
WEAKNESS: 1
PARESTHESIAS: 0
HEMATURIA: 0
NERVOUS/ANXIOUS: 0
HEADACHES: 0
ARTHRALGIAS: 1
MYALGIAS: 0
ABDOMINAL PAIN: 0
SORE THROAT: 0
SHORTNESS OF BREATH: 0
CONSTIPATION: 0
CHILLS: 0
PALPITATIONS: 0
DIZZINESS: 1
HEMATOCHEZIA: 0
DYSURIA: 0
FEVER: 0
EYE PAIN: 0
JOINT SWELLING: 1
HEARTBURN: 0

## 2023-11-06 ASSESSMENT — ACTIVITIES OF DAILY LIVING (ADL)
CURRENT_FUNCTION: TRANSPORTATION REQUIRES ASSISTANCE
CURRENT_FUNCTION: MEDICATION ADMINISTRATION REQUIRES ASSISTANCE
CURRENT_FUNCTION: SHOPPING REQUIRES ASSISTANCE

## 2023-11-06 ASSESSMENT — PAIN SCALES - GENERAL: PAINLEVEL: NO PAIN (0)

## 2023-11-06 NOTE — PROGRESS NOTES
"   SUBJECTIVE:   CC: Melissa is an 68 year old who presents for preventive health visit.       11/6/2023     3:23 PM   Additional Questions   Roomed by Kassy   Accompanied by Self       Healthy Habits:     In general, how would you rate your overall health?  Poor    Frequency of exercise:  2-3 days/week    Duration of exercise:  N/A    Do you usually eat at least 4 servings of fruit and vegetables a day, include whole grains    & fiber and avoid regularly eating high fat or \"junk\" foods?  Yes    Taking medications regularly:  Yes    Medication side effects:  Not applicable    Ability to successfully perform activities of daily living:  Transportation requires assistance, shopping requires assistance and medication administration requires assistance    Home Safety:  No safety concerns identified    Hearing Impairment:  No hearing concerns    In the past 6 months, have you been bothered by leaking of urine?  No    In general, how would you rate your overall mental or emotional health?  Poor    Additional concerns today:  Yes    Background history     CVA:  Had stroke 04/07/2022   presents with a 3-day hx of left-sided RLE>RUE weakness and then a mechanical fall. In the ED, bp up to 229/86, HR 50-60s, CTA head revealed  moderate stenosis of the M2 branch of the left middle cerebral artery and High-grade stenosis of the P1 segment of the left posterior cerebral artery.  MRI revealed Acute/subacute infarct adjacent to the body of the right lateral ventricle. Neurology was consulted, started pt on  to continue and clopidogrel 75 mg x 21 days only  Residual left side weakness      Hypertension:  Norvasc 5 mg   Lisinopril 20 mg      Prediabetes      Lab Results   Component Value Date    A1C 6.0 10/04/2022    A1C 6.1 01/08/2019    A1C 6.1 03/16/2018     Hyperlipidemia   on Atorvastatin 40 mg daily  - Goal LDL <70      Preventive -     Immunization History   Administered Date(s) Administered    COVID-19 Monovalent 18+ " (Moderna) 07/08/2021, 08/06/2021, 04/28/2022    Influenza Vaccine 65+ (Fluzone HD) 09/29/2020, 10/04/2022    Influenza Vaccine >6 months (Alfuria,Fluzone) 01/18/2018, 01/08/2019    Mantoux Tuberculin Skin Test 03/20/2018    Pneumococcal 20 valent Conjugate (Prevnar 20) 05/01/2022    TDAP Vaccine (Adacel) 03/22/2017       - Colon CA screen: Colonoscopy, age 45-75 every 10 years or FIT every year or Cologuard every 3 years   Fit test ordered       -      Today's PHQ-2 Score:       11/6/2023     3:09 PM   PHQ-2 ( 1999 Pfizer)   Q1: Little interest or pleasure in doing things 0   Q2: Feeling down, depressed or hopeless 0   PHQ-2 Score 0   Q1: Little interest or pleasure in doing things Not at all   Q2: Feeling down, depressed or hopeless Not at all   PHQ-2 Score 0           Social History     Tobacco Use    Smoking status: Never    Smokeless tobacco: Never   Substance Use Topics    Alcohol use: Yes     Comment: small amt             11/6/2023     3:09 PM   Alcohol Use   Prescreen: >3 drinks/day or >7 drinks/week? No     Reviewed orders with patient.  Reviewed health maintenance and updated orders accordingly - Yes  Lab work is in process  Labs reviewed in EPIC  BP Readings from Last 3 Encounters:   11/06/23 124/72   10/04/22 131/74   12/16/21 (!) 158/69    Wt Readings from Last 3 Encounters:   11/06/23 93 kg (205 lb)   10/04/22 84.4 kg (186 lb)   12/16/21 95.7 kg (211 lb)                  Patient Active Problem List   Diagnosis    Hypertension goal BP (blood pressure) < 140/90    Morbid obesity (H)    Latent tuberculosis by blood test    Prediabetes    Cerebrovascular accident (CVA) due to thrombosis of right middle cerebral artery (H)     No past surgical history on file.    Social History     Tobacco Use    Smoking status: Never    Smokeless tobacco: Never   Substance Use Topics    Alcohol use: Yes     Comment: small amt     Family History   Problem Relation Age of Onset    Hernia Brother     Other - See Comments Son          suffers from sneezing disorder         Current Outpatient Medications   Medication Sig Dispense Refill    Acetaminophen (TYLENOL EXTRA STRENGTH PO) Take 500 mg by mouth Reported on 4/13/2017      amLODIPine (NORVASC) 5 MG tablet Take 5 mg by mouth      ammonium lactate (AMLACTIN) 12 % external cream Apply topically 2 times daily 385 g 0    aspirin (ASA) 325 MG tablet       atorvastatin (LIPITOR) 40 MG tablet Take 40 mg by mouth      diclofenac (VOLTAREN) 1 % topical gel Apply 2 g topically      dorzolamide-timolol PF (COSOPT PF) 2-0.5 % opthalmic solutionh       enoxaparin ANTICOAGULANT (LOVENOX) 40 MG/0.4ML syringe Inject 40 mg Subcutaneous      ergocalciferol (ERGOCALCIFEROL) 1.25 MG (32568 UT) capsule Take 50,000 Units by mouth      latanoprost (XALATAN) 0.005 % ophthalmic solution Apply 1 drop to eye At Bedtime      lisinopril (ZESTRIL) 20 MG tablet Take 20 mg by mouth      lisinopril (ZESTRIL) 20 MG tablet TAKE 1 TABLET BY MOUTH EVERY DAY 30 tablet 0    meloxicam (MOBIC) 15 MG tablet Take 1 tablet (15 mg) by mouth daily as needed 30 tablet 1    metFORMIN (GLUCOPHAGE) 500 MG tablet TAKE 1 TABLET (500 MG) BY MOUTH DAILY (WITH BREAKFAST) NEEDS TO BE SEEN FOR FURTHER REFILLS 90 tablet 0    mineral oil-hydrophilic petrolatum (AQUAPHOR) external ointment       nabumetone (RELAFEN) 750 MG tablet Take 1 tablet (750 mg) by mouth 2 times daily 180 tablet 1    naproxen (NAPROSYN) 500 MG tablet Take 500 mg by mouth      polyethylene glycol (MIRALAX) 17 GM/Dose powder Take 17 g by mouth      pyridOXINE (VITAMIN B-6) 50 MG tablet Take 1 tablet (50 mg) by mouth daily 90 tablet 3    senna-docusate (SENOKOT-S/PERICOLACE) 8.6-50 MG tablet Take 2 tablets by mouth      tiZANidine (ZANAFLEX) 4 MG tablet Take 4 mg by mouth       Allergies   Allergen Reactions    Sodium      Recent Labs   Lab Test 10/04/22  1127 06/05/19  1109 01/08/19  1037 08/03/18  1035 04/30/18  1106 03/16/18  1212 01/18/18  0955 08/21/17  1022  03/22/17  1035   A1C 6.0*  --  6.1*  --   --  6.1*  --   --   --    LDL 54  --   --   --  120*  --   --   --  136*   HDL 67  --   --   --   --   --   --   --  75   TRIG 87  --   --   --   --   --   --   --  105   ALT 45 16 19   < > 18  --   --   --   --    CR 0.62 0.66 0.68   < >  --  0.68 0.74   < > 0.83   GFRESTIMATED >90 >90 >90   < >  --  87 79   < > 70   GFRESTBLACK  --  >90 >90   < >  --  >90 >90   < > 84   POTASSIUM 3.9 4.0 4.4   < >  --  3.8 4.1   < > 3.3*   TSH  --   --   --   --   --   --  1.22  --   --     < > = values in this interval not displayed.        Breast Cancer Screening:        10/4/2022    10:38 AM   Breast CA Risk Assessment (FHS-7)   Do you have a family history of breast, colon, or ovarian cancer? No / Unknown         Mammogram Screening: Recommended mammography every 1-2 years with patient discussion and risk factor consideration  Pertinent mammograms are reviewed under the imaging tab.    History of abnormal Pap smear: Last 3 Pap Results:   PAP (no units)   Date Value   03/22/2017 NIL         Latest Ref Rng & Units 3/22/2017    10:10 AM 3/22/2017    10:00 AM   PAP / HPV   PAP (Historical)  NIL     HPV 16 DNA NEG  Negative    HPV 18 DNA NEG  Negative    Other HR HPV NEG  Negative      Reviewed and updated as needed this visit by clinical staff   Tobacco  Allergies  Meds              Reviewed and updated as needed this visit by Provider                 No past medical history on file.   No past surgical history on file.  OB History   No obstetric history on file.       Review of Systems   Constitutional:  Negative for chills and fever.   HENT:  Negative for congestion, ear pain, hearing loss and sore throat.    Eyes:  Negative for pain and visual disturbance.   Respiratory:  Negative for cough and shortness of breath.    Cardiovascular:  Negative for chest pain, palpitations and peripheral edema.   Gastrointestinal:  Negative for abdominal pain, constipation, diarrhea, heartburn,  "hematochezia and nausea.   Breasts:  Negative for tenderness, breast mass and discharge.   Genitourinary:  Positive for frequency. Negative for dysuria, genital sores, hematuria, pelvic pain, urgency, vaginal bleeding and vaginal discharge.   Musculoskeletal:  Positive for arthralgias and joint swelling. Negative for myalgias.   Skin:  Negative for rash.   Neurological:  Positive for dizziness and weakness. Negative for headaches and paresthesias.   Psychiatric/Behavioral:  Positive for mood changes. The patient is not nervous/anxious.           OBJECTIVE:   /72   Pulse 61   Temp 98.6  F (37  C) (Tympanic)   Resp 16   Ht 1.575 m (5' 2\")   Wt 93 kg (205 lb)   LMP  (LMP Unknown)   SpO2 97%   BMI 37.49 kg/m    Physical Exam  Vitals and nursing note reviewed.   Constitutional:       General: She is not in acute distress.     Appearance: Normal appearance. She is not ill-appearing, toxic-appearing or diaphoretic.   Cardiovascular:      Rate and Rhythm: Normal rate and regular rhythm.      Heart sounds: No murmur heard.     No friction rub. No gallop.   Pulmonary:      Effort: No respiratory distress.      Breath sounds: No stridor. No wheezing, rhonchi or rales.   Chest:      Chest wall: No tenderness.   Neurological:      Mental Status: She is alert.               ASSESSMENT/PLAN:   (Z00.00) Routine general medical examination at a health care facility  (primary encounter diagnosis)  Preventive care reviewed and updated.    Plan: Primary Care - Care Coordination Referral          (Z12.11) Colon cancer screening    Plan: Fecal colorectal cancer screen FIT - Future         (S+30)          (L85.3) Dry skin  Dry skin in both hands     Plan: ammonium lactate (AMLACTIN) 12 % external cream          Patient has been advised of split billing requirements and indicates understanding: Yes      COUNSELING:  Reviewed preventive health counseling, as reflected in patient instructions       Healthy " diet/nutrition        She reports that she has never smoked. She has never used smokeless tobacco.          Esperanza Buck MD  [facility]

## 2023-11-06 NOTE — PATIENT INSTRUCTIONS
Preventive Health Recommendations    See your health care provider every year to  Review health changes.   Discuss preventive care.    Review your medicines if your doctor has prescribed any.    You no longer need a yearly Pap test unless you've had an abnormal Pap test in the past 10 years. If you have vaginal symptoms, such as bleeding or discharge, be sure to talk with your provider about a Pap test.    Every 1 to 2 years, have a mammogram.  If you are over 69, talk with your health care provider about whether or not you want to continue having screening mammograms.    Every 10 years, have a colonoscopy. Or, have a yearly FIT test (stool test). These exams will check for colon cancer.     Have a cholesterol test every 5 years, or more often if your doctor advises it.     Have a diabetes test (fasting glucose) every three years. If you are at risk for diabetes, you should have this test more often.     At age 65, have a bone density scan (DEXA) to check for osteoporosis (brittle bone disease).    Shots:  Get a flu shot each year.  Get a tetanus shot every 10 years.  Talk to your doctor about your pneumonia vaccines. There are now two you should receive - Pneumovax (PPSV 23) and Prevnar (PCV 13).  Talk to your pharmacist about the shingles vaccine.  Talk to your doctor about the hepatitis B vaccine.    Nutrition:   Eat at least 5 servings of fruits and vegetables each day.    Eat whole-grain bread, whole-wheat pasta and brown rice instead of white grains and rice.    Get adequate about Calcium and Vitamin D.     Lifestyle  Exercise at least 150 minutes a week (30 minutes a day, 5 days a week). This will help you control your weight and prevent disease.    Limit alcohol to one drink per day.    No smoking.     Wear sunscreen to prevent skin cancer.     See your dentist twice a year for an exam and cleaning.    See your eye doctor every 1 to 2 years to screen for conditions such as glaucoma, macular degeneration,  cataracts, etc.    Personalized Prevention Plan  You are due for the preventive services outlined below.  Your care team is available to assist you in scheduling these services.  If you have already completed any of these items, please share that information with your care team to update in your medical record.    Health Maintenance Due   Topic Date Due     Osteoporosis Screening  Never done     ANNUAL REVIEW OF HM ORDERS  Never done     Zoster (Shingles) Vaccine (1 of 2) Never done     RSV VACCINE (Pregnancy & 60+) (1 - 1-dose 60+ series) Never done     Flu Vaccine (1) 09/01/2023     COVID-19 Vaccine (4 - 2023-24 season) 09/01/2023     Annual Wellness Visit  10/04/2023     Cholesterol Lab  10/04/2023     Colorectal Cancer Screening  10/06/2023     Patient Education   Personalized Prevention Plan  You are due for the preventive services outlined below.  Your care team is available to assist you in scheduling these services.  If you have already completed any of these items, please share that information with your care team to update in your medical record.  Health Maintenance Due   Topic Date Due     Osteoporosis Screening  Never done     ANNUAL REVIEW OF HM ORDERS  Never done     Zoster (Shingles) Vaccine (1 of 2) Never done     RSV VACCINE (Pregnancy & 60+) (1 - 1-dose 60+ series) Never done     Flu Vaccine (1) 09/01/2023     COVID-19 Vaccine (4 - 2023-24 season) 09/01/2023     Annual Wellness Visit  10/04/2023     Cholesterol Lab  10/04/2023     Colorectal Cancer Screening  10/06/2023     Your Health Risk Assessment indicates you feel you are not in good health    A healthy lifestyle helps keep the body fit and the mind alert. It helps protect you from disease, helps you fight disease, and helps prevent chronic disease (disease that doesn't go away) from getting worse. This is important as you get older and begin to notice twinges in muscles and joints and a decline in the strength and stamina you once took for  granted. A healthy lifestyle includes good healthcare, good nutrition, weight control, recreation, and regular exercise. Avoid harmful substances and do what you can to keep safe. Another part of a healthy lifestyle is stay mentally active and socially involved.    Good healthcare   Have a wellness visit every year.   If you have new symptoms, let us know right away. Don't wait until the next checkup.   Take medicines exactly as prescribed and keep your medicines in a safe place. Tell us if your medicine causes problems.   Healthy diet and weight control   Eat 3 or 4 small, nutritious, low-fat, high-fiber meals a day. Include a variety of fruits, vegetables, and whole-grain foods.   Make sure you get enough calcium in your diet. Calcium, vitamin D, and exercise help prevent osteoporosis (bone thinning).   If you live alone, try eating with others when you can. That way you get a good meal and have company while you eat it.   Try to keep a healthy weight. If you eat more calories than your body uses for energy, it will be stored as fat and you will gain weight.     Recreation   Recreation is not limited to sports and team events. It includes any activity that provides relaxation, interest, enjoyment, and exercise. Recreation provides an outlet for physical, mental, and social energy. It can give a sense of worth and achievement. It can help you stay healthy.    Mental Exercise and Social Involvement  Mental and emotional health is as important as physical health. Keep in touch with friends and family. Stay as active as possible. Continue to learn and challenge yourself.   Things you can do to stay mentally active are:  Learn something new, like a foreign language or musical instrument.   Play SCRABBLE or do crossword puzzles. If you cannot find people to play these games with you at home, you can play them with others on your computer through the Internet.   Join a games club--anything from card games to chess or  checkers or lawn bowling.   Start a new hobby.   Go back to school.   Volunteer.   Read.   Keep up with world events.  Activities of Daily Living    Your Health Risk Assessment indicates you have difficulties with activities of daily living such as housework, bathing, preparing meals, taking medication, etc. Please make a follow up appointment for us to address this issue in more detail.  Your Health Risk Assessment indicates you feel you are not in good emotional health.    Recreation   Recreation is not limited to sports and team events. It includes any activity that provides relaxation, interest, enjoyment, and exercise. Recreation provides an outlet for physical, mental, and social energy. It can give a sense of worth and achievement. It can help you stay healthy.    Mental Exercise and Social Involvement  Mental and emotional health is as important as physical health. Keep in touch with friends and family. Stay as active as possible. Continue to learn and challenge yourself.   Things you can do to stay mentally active are:  Learn something new, like a foreign language or musical instrument.   Play SCRABBLE or do crossword puzzles. If you cannot find people to play these games with you at home, you can play them with others on your computer through the Internet.   Join a games club--anything from card games to chess or checkers or lawn bowling.   Start a new hobby.   Go back to school.   Volunteer.   Read.   Keep up with world events.

## 2023-11-07 ENCOUNTER — PATIENT OUTREACH (OUTPATIENT)
Dept: CARE COORDINATION | Facility: CLINIC | Age: 68
End: 2023-11-07
Payer: COMMERCIAL

## 2023-11-07 NOTE — PROGRESS NOTES
Clinic Care Coordination Contact  Community Health Worker Initial Outreach    Spoke to Patient (Melissa)  CHW Introduced intent of call regarding PCP referral  Patients reports that she is doing okay.   CHW introduce Clinic Care Coordination and Patient responded that support is needed with obtaining transportation resources.    CHW acknowledged and scheduled Patient for an CCC Phone Visit with AN CCC SW for an SW CCC Assessment on 11/10/2023 at 1:00 PM. Patient acknowledged.   CHW acknowledged and provided Patient with CHW contact information for additional support needed.     CHW Initial Information Gathering:  Referral Source: PCP  Preferred Urgent Care: [facility], [phone]  Current living arrangement:: I live in a private home with family (Patient lives with her daughter)  Type of residence:: Apartment  Community Resources: None  Informal Support system:: Children, Family  No PCP office visit in Past Year: No  Transportation means:: Family  CHW Additional Questions  If ED/Hospital discharge, follow-up appointment scheduled as recommended?: N/A  Medication changes made following ED/Hospital discharge?: N/A  MyChart active?: No  Patient agreeable to assistance with activating MyChart?: No    Patient accepts CC: Yes. Patient scheduled for assessment with AN [facility] SW on 11/10/2023 at 1:00 PM. Patient noted desire to discuss support is needed with obtaining transportation resources.     KEV Drew  Clinic Care Coordination   [facility]   Phone: [phone]  [email or web address]

## 2023-11-10 ENCOUNTER — PATIENT OUTREACH (OUTPATIENT)
Dept: NURSING | Facility: CLINIC | Age: 68
End: 2023-11-10
Payer: COMMERCIAL

## 2023-11-10 NOTE — PROGRESS NOTES
Clinic Care Coordination Contact    S-(situation): Provider placed CC referral.    B-(background): Patient is not enrolled in CC.    A-(assessment): SW CC outreached to patient for care coordination, patient needed number for are transportation line, SW CC gave patient this number. No needs or concerns otherwise.     Plan: SW CC to close out care coordination program, resources given, no ongoing needs.     Germania Jones, [facility]  Social Work Primary Care Clinic Care Coordinator  [facility]  [phone]  [email or web address]

## 2023-11-17 DIAGNOSIS — I10 HYPERTENSION GOAL BP (BLOOD PRESSURE) < 140/90: Primary | ICD-10-CM

## 2023-11-17 NOTE — TELEPHONE ENCOUNTER
Patient calling and requesting medication refill on these medications.    Patient in office visit on 11/06/23. Provider are you willing to take over and manage these medications for patient?    Meds and pharmacy pended below.    Latrice Richards RN

## 2023-11-18 ENCOUNTER — NURSE TRIAGE (OUTPATIENT)
Dept: NURSING | Facility: CLINIC | Age: 68
End: 2023-11-18
Payer: COMMERCIAL

## 2023-11-18 DIAGNOSIS — I10 HYPERTENSION GOAL BP (BLOOD PRESSURE) < 140/90: ICD-10-CM

## 2023-11-18 RX ORDER — LISINOPRIL 20 MG/1
20 TABLET ORAL DAILY
Qty: 7 TABLET | Refills: 0 | Status: SHIPPED | OUTPATIENT
Start: 2023-11-18 | End: 2023-11-20

## 2023-11-18 RX ORDER — AMLODIPINE BESYLATE 5 MG/1
5 TABLET ORAL DAILY
Qty: 7 TABLET | Refills: 0 | Status: SHIPPED | OUTPATIENT
Start: 2023-11-18 | End: 2023-11-20

## 2023-11-18 RX ORDER — ATORVASTATIN CALCIUM 40 MG/1
40 TABLET, FILM COATED ORAL DAILY
Qty: 90 TABLET | Refills: 1 | Status: CANCELLED | OUTPATIENT
Start: 2023-11-18

## 2023-11-18 NOTE — TELEPHONE ENCOUNTER
Pt's daughter calling, verbal consent given to communicate.   Wants to know if Rx were refilled from request yesterday. Pt reports being completely out of medications.    lisinopril 20 mg   Amlodipine 5mg  Pt states last time having Rx refilled by [facility] was Last year. Historical report of medications.   Current 151/75     Prefer pharmacy [facility].     Page sent 620pm to Dr Charles Deleon, called answering service at 642pm. Returned call at 644pm, okay 7 day refill for essential meds. Needs to follow up on Monday.     Rx refill in previous open Refill encounter.     Provider Recommendation Follow Up:   Reached patient/caregiver. Informed of provider's recommendations. Patient verbalized understanding and agrees with the plan.         Kaykay Cruz, RN, BSN  11/18/2023 at 6:22 PM  [facility]        Reason for Disposition   Ran out of BP medications    Additional Information   Negative: Difficult to awaken or acting confused (e.g., disoriented, slurred speech)   Negative: Severe difficulty breathing (e.g., struggling for each breath, speaks in single words)   Negative: [1] Weakness of the face, arm or leg on one side of the body AND [2] new onset   Negative: [1] Numbness (i.e., loss of sensation) of the face, arm or leg on one side of the body AND [2] new onset   Negative: [1] Chest pain lasts > 5 minutes AND [2] history of heart disease  (i.e., heart attack, bypass surgery, angina, angioplasty, CHF)   Negative: [1] Chest pain AND [2] took nitrogylcerin AND [3] pain was not relieved   Negative: Sounds like a life-threatening emergency to the triager   Negative: [1] Systolic BP  >= 160 OR Diastolic >= 100 AND [2] cardiac or neurologic symptoms (e.g., chest pain, difficulty breathing, unsteady gait, blurred vision)   Negative: [1] Pregnant > 20 weeks (or postpartum < 6 weeks) AND [2] new hand or face swelling   Negative: [1] Pregnant > 20 weeks AND [2] BP Systolic BP  >= 140 OR Diastolic >= 90   Negative:  [1] Systolic BP  >= 200 OR Diastolic >= 120  AND [2] having NO cardiac or neurologic symptoms   Negative: [1] Systolic BP  >= 180 OR Diastolic >= 110 AND [2] missed most recent dose of blood pressure medication   Negative: [1] Postpartum < 6 weeks AND [2] BP Systolic BP  >= 140 OR Diastolic >= 90   Negative: Systolic BP  >= 180 OR Diastolic >= 110    Protocols used: High Blood Pressure-A-AH

## 2023-11-19 NOTE — TELEPHONE ENCOUNTER
On call provider kady Collins for 7 day supply of medications Lisinopril and Amlodipine. Needs to follow up with PCP.    Kaykay Cruz RN, BSN  11/18/2023 at 6:49 PM  [facility]

## 2023-11-20 DIAGNOSIS — I10 HYPERTENSION GOAL BP (BLOOD PRESSURE) < 140/90: ICD-10-CM

## 2023-11-20 DIAGNOSIS — I63.311 CEREBROVASCULAR ACCIDENT (CVA) DUE TO THROMBOSIS OF RIGHT MIDDLE CEREBRAL ARTERY (H): Primary | ICD-10-CM

## 2023-11-20 RX ORDER — ASPIRIN 325 MG
325 TABLET ORAL DAILY
Qty: 100 TABLET | Refills: 0 | Status: SHIPPED | OUTPATIENT
Start: 2023-11-20 | End: 2024-01-08

## 2023-11-20 RX ORDER — LATANOPROST 50 UG/ML
1 SOLUTION/ DROPS OPHTHALMIC DAILY
Qty: 7.5 ML | Refills: 1 | OUTPATIENT
Start: 2023-11-20

## 2023-11-20 RX ORDER — ATORVASTATIN CALCIUM 40 MG/1
40 TABLET, FILM COATED ORAL DAILY
Qty: 90 TABLET | Refills: 0 | Status: SHIPPED | OUTPATIENT
Start: 2023-11-20 | End: 2024-02-28

## 2023-11-20 RX ORDER — METHOCARBAMOL 500 MG/1
500 TABLET, FILM COATED ORAL 2 TIMES DAILY
Qty: 60 TABLET | Refills: 0 | Status: SHIPPED | OUTPATIENT
Start: 2023-11-20 | End: 2024-03-21

## 2023-11-20 RX ORDER — AMLODIPINE BESYLATE 5 MG/1
5 TABLET ORAL DAILY
Qty: 90 TABLET | Refills: 1 | Status: SHIPPED | OUTPATIENT
Start: 2023-11-20 | End: 2023-11-20

## 2023-11-20 RX ORDER — DORZOLAMIDE HYDROCHLORIDE AND TIMOLOL MALEATE PRESERVATIVE FREE 20; 5 MG/ML; MG/ML
1 SOLUTION/ DROPS OPHTHALMIC 2 TIMES DAILY
Qty: 60 EACH | Refills: 1 | OUTPATIENT
Start: 2023-11-20

## 2023-11-20 RX ORDER — AMLODIPINE BESYLATE 10 MG/1
10 TABLET ORAL DAILY
Qty: 90 TABLET | Refills: 0 | Status: SHIPPED | OUTPATIENT
Start: 2023-11-20 | End: 2024-02-25

## 2023-11-20 RX ORDER — LISINOPRIL 20 MG/1
20 TABLET ORAL DAILY
Qty: 90 TABLET | Refills: 1 | Status: SHIPPED | OUTPATIENT
Start: 2023-11-20 | End: 2024-05-17

## 2023-11-20 NOTE — TELEPHONE ENCOUNTER
Patient & daughter were here in clinic requesting all of her Rx's be filled.  I did ask specifically which ones and they refused to give me that info and just insisted all be filled. She said that her Lisiopril was the only med refilled yesterday and they were only given 5 pills and she has taken those all already. I also did suggest they reach out to pharmacy to check for refills on the other Rx's.     I think a nurse needs to reach out to patient to discuss this.     I did pend the pharmacy in the chart. I am sorry I could not get more information. Sending as high priority as patient is now out of lisinopril.     Kaylynn Grace,    [facility]

## 2023-11-20 NOTE — TELEPHONE ENCOUNTER
There is a consent to communicate with Yoselin Neumann (son) and Kian Neumann (daughter-in-law) on file dated 7/8/2021. (TD-11/20/2023).      I did receive permission from Melissa to speak with Yoko (daughter).    Both patient and Yoko were notified that amlodipine and lisinopril were sent to the pharmacy and she will need to be seen in 6 months. Patient/daughter verbalized good understanding, agrees with plan and needs no further support.  Thank you. Adriana Lauren R.N.

## 2023-11-20 NOTE — TELEPHONE ENCOUNTER
Provider:  Patient was just seen 11/6/2023.  Please refill the medication.  Thank you. Adriana Lauren R.N.

## 2023-11-20 NOTE — TELEPHONE ENCOUNTER
Pt clarified refills needed.      1)  Pt states the amlodipine should be 10 mg daily, not 5 mg daily.    2) advised lisinopril was already sent to pharmacy.    3) pt states she takes methocarobam 500 mg, a muscle relaxer twice a day.  She is unsure where she got the med or why she is taking it.    4) she does take atorvastatin 40 mg daily    5) she does take one asa 325 mg daily.    Meds pended.  Pinky MERRITTN, RN

## 2023-11-20 NOTE — TELEPHONE ENCOUNTER
Pt son calling about refills. He is frustrated that refills were not done at last ov.  Advised refills are out to provider.  Pinky MERRITTN, RN

## 2023-11-20 NOTE — TELEPHONE ENCOUNTER
Does patient need an appointment to discuss prescribing these medications?    Latrice Richards RN

## 2023-11-20 NOTE — TELEPHONE ENCOUNTER
She has 2 eye drops Xalatan and Cosopt I could not find associated diagnosis- she needs to contact her eye doctor for refill on these eye drops     Esperanza Buck MD.

## 2023-11-22 DIAGNOSIS — L85.3 DRY SKIN: ICD-10-CM

## 2023-11-22 RX ORDER — AMMONIUM LACTATE 12 G/100G
CREAM TOPICAL 2 TIMES DAILY
Qty: 385 G | Refills: 0 | OUTPATIENT
Start: 2023-11-22

## 2023-12-13 ENCOUNTER — NURSE TRIAGE (OUTPATIENT)
Dept: FAMILY MEDICINE | Facility: CLINIC | Age: 68
End: 2023-12-13
Payer: COMMERCIAL

## 2023-12-13 NOTE — TELEPHONE ENCOUNTER
"Pt requested appt for ongoing dizziness, generalized fatigue, bilateral numbness and/ tingling in arms and fingers  Onset daily since stroke last year -6/8/22  Usual strength and sensation intact, full ROM    Woke up this morning with H/A, took 2 500MG tylenol this morning with relief   Patient able to walk with walker and without difficulty, denies chest pain, SOB, room spinning or lightheadedness, N/V., alert/oriented.   Able to speak clearly and thoughtfully, does have accent     Had stroke 6/8/22 and states has had sx since - no worse than normal    Does PT for left arm on Mondays and Fridays  States \"worked out this morning for 15 mins\"  Well hydrated - drinks a gallon of water daily    Per protocol for dizziness present now, after 2 hrs rest and fluid  Routed to PCP to review and schedule    Reason for Disposition   Lightheadedness (dizziness) present now, after 2 hours of rest and fluids   Patient wants to be seen    Additional Information   Negative: SEVERE difficulty breathing (e.g., struggling for each breath, speaks in single words)   Negative: Shock suspected (e.g., cold/pale/clammy skin, too weak to stand, low BP, rapid pulse)   Negative: Difficult to awaken or acting confused (e.g., disoriented, slurred speech)   Negative: Fainted, and still feels dizzy afterwards   Negative: Overdose (accidental or intentional) of medications   Negative: New neurologic deficit that is present now: * Weakness of the face, arm, or leg on one side of the body * Numbness of the face, arm, or leg on one side of the body * Loss of speech or garbled speech   Negative: Heart beating < 50 beats per minute OR > 140 beats per minute   Negative: Sounds like a life-threatening emergency to the triager   Negative: SEVERE dizziness (e.g., unable to stand, requires support to walk, feels like passing out now)   Negative: SEVERE headache or neck pain   Negative: Spinning or tilting sensation (vertigo) present now and one or more " "stroke risk factors (i.e., hypertension, diabetes mellitus, prior stroke/TIA, heart attack, age over 60) (Exception: Prior physician evaluation for this AND no different/worse than usual.)   Negative: Neurologic deficit that was brief (now gone), ANY of the following:* Weakness of the face, arm, or leg on one side of the body* Numbness of the face, arm, or leg on one side of the body* Loss of speech or garbled speech   Negative: Loss of vision or double vision  (Exception: Similar to previous migraines.)   Negative: Extra heartbeats, irregular heart beating, or heart is beating very fast (i.e., 'palpitations')   Negative: Difficulty breathing   Negative: Drinking very little and dehydration suspected (e.g., no urine > 12 hours, very dry mouth, very lightheaded)   Negative: Follows bleeding (e.g., stomach, rectum, vagina)  (Exception: Became dizzy from sight of small amount blood.)   Negative: Patient sounds very sick or weak to the triager   Negative: Vomiting occurs with dizziness   Negative: Chest pain   Negative: Rectal bleeding, bloody stool, or tarry-black stool   Negative: Vomiting is main symptom   Negative: Diarrhea is main symptom   Negative: Headache is main symptom   Negative: Heat exhaustion suspected (i.e., dehydration from heat exposure)   Negative: Patient states that they are having an anxiety or panic attack   Negative: Dizziness from low blood sugar (i.e., < 60 mg/dl or 3.5 mmol/l)    Answer Assessment - Initial Assessment Questions  1. DESCRIPTION: \"Describe your dizziness.\"      Feels dizzy all the time  - speceifially since stroke last year   2. LIGHTHEADED: \"Do you feel lightheaded?\" (e.g., somewhat faint, woozy, weak upon standing)      no  3. VERTIGO: \"Do you feel like either you or the room is spinning or tilting?\" (i.e. vertigo)      no  4. SEVERITY: \"How bad is it?\"  \"Do you feel like you are going to faint?\" \"Can you stand and walk?\"    - MILD: Feels slightly dizzy, but walking " "normally.    - MODERATE: Feels unsteady when walking, but not falling; interferes with normal activities (e.g., school, work).    - SEVERE: Unable to walk without falling, or requires assistance to walk without falling; feels like passing out now.       Mild - states walking normally with walker and able to do PT 2x weekly and workout for 15 mins daily  5. ONSET:  \"When did the dizziness begin?\"      Since stroke last year   6. AGGRAVATING FACTORS: \"Does anything make it worse?\" (e.g., standing, change in head position)      no  7. HEART RATE: \"Can you tell me your heart rate?\" \"How many beats in 15 seconds?\"  (Note: not all patients can do this)       Doesn't feel heart racing or palpitations   8. CAUSE: \"What do you think is causing the dizziness?\"      Unsure   9. RECURRENT SYMPTOM: \"Have you had dizziness before?\" If Yes, ask: \"When was the last time?\" \"What happened that time?\"      Yes - experiencing it daily  10. OTHER SYMPTOMS: \"Do you have any other symptoms?\" (e.g., fever, chest pain, vomiting, diarrhea, bleeding)        Bilateral Numbness, tingling in arms and fingers and generalized fatigue   11. PREGNANCY: \"Is there any chance you are pregnant?\" \"When was your last menstrual period?\"       no    Protocols used: Dizziness-A-OH    Kaylynn SAEZ RN  [facility]    "

## 2023-12-13 NOTE — TELEPHONE ENCOUNTER
Patient called back and RN told her that Dr Buck recommends urgent care today. Gave her the hours of our [place] urgent care and she agrees to come in and be evaluated.     Baylee Perry BSN, RN

## 2023-12-14 ENCOUNTER — OFFICE VISIT (OUTPATIENT)
Dept: URGENT CARE | Facility: URGENT CARE | Age: 68
End: 2023-12-14
Payer: COMMERCIAL

## 2023-12-14 VITALS
SYSTOLIC BLOOD PRESSURE: 127 MMHG | DIASTOLIC BLOOD PRESSURE: 79 MMHG | BODY MASS INDEX: 36.58 KG/M2 | RESPIRATION RATE: 20 BRPM | OXYGEN SATURATION: 97 % | WEIGHT: 200 LBS | HEART RATE: 62 BPM | TEMPERATURE: 97.4 F

## 2023-12-14 DIAGNOSIS — R47.9 DIFFICULTY WITH SPEECH: ICD-10-CM

## 2023-12-14 DIAGNOSIS — R29.898 LUE WEAKNESS: ICD-10-CM

## 2023-12-14 DIAGNOSIS — Z86.73 HISTORY OF CVA (CEREBROVASCULAR ACCIDENT): Primary | ICD-10-CM

## 2023-12-14 PROCEDURE — 99214 OFFICE O/P EST MOD 30 MIN: CPT

## 2023-12-14 NOTE — PATIENT INSTRUCTIONS
Given your current symptoms, health history and the inability to discern in the clinic today whether this is acute versus chronic absent advanced imaging; it is advised you proceed to an emergency department for further evaluation and treatment.

## 2023-12-14 NOTE — PROGRESS NOTES
Assessment & Plan   (Z86.73) History of CVA (cerebrovascular accident)  (primary encounter diagnosis)    (R29.898) LUE weakness    (R47.9) Difficulty with speech    Informed the patient that given her current symptoms, past medical health history and the inability to discern in the clinic today whether the symptoms she is experiencing is acute versus chronic absent advanced imaging; it is advised that she proceed to an emergency department for further evaluation and treatment.  Patient acknowledged her understanding of the above plan and indicated she would proceed to an emergency department for further evaluation and treatment.    The use of Dragon/TapIn.tvation services may have been used to construct the content in this note; any grammatical or spelling errors are non-intentional. Please contact the author of this note directly if you are in need of any clarification.      TAMMY Escobar [facility]    Madison Torres is a 68 year old female who presents to clinic today for the following health issues:  Chief Complaint   Patient presents with    Urgent Care     Present for dizziness and back pain that is radiating down right thigh      HPI  Patient reports having dizziness, low back pain on the right side radiating down her right leg, difficulty saying specific words and headache ongoing intermittently since she had a CVA in April of 2022.  She is unsure as to when her most recent episodes began or if this is any worse than usual since she had the CVA in April 2022.      ROS:  Negative except noted above.    Review of Systems        Objective    /79   Pulse 62   Temp 97.4  F (36.3  C) (Tympanic)   Resp 20   Wt 90.7 kg (200 lb)   LMP  (LMP Unknown)   SpO2 97%   BMI 36.58 kg/m    Physical Exam   GENERAL: healthy, alert and no distress  EYES: Eyes grossly normal to inspection, PERRL and conjunctivae and sclerae normal  HENT: ear canals and TM's  normal, nose and mouth without ulcers or lesions  NECK: no adenopathy, no asymmetry, masses, or scars and thyroid normal to palpation  RESP: lungs clear to auscultation - no rales, rhonchi or wheezes  CV: regular rate and rhythm, normal S1 S2, no S3 or S4, no murmur, click or rub  MS: Left upper and left lower extremities 4/5 compared to 5/5 on the right.  Ambulatory with a walker with movement of the left lower extremity out of sequence with the right lower extremity and not normal ambulation stride.    SKIN: no suspicious lesions or rashes  NEURO: Alert and oriented times three however reported the year as 2003. Weakness of left upper and lower extremities, sensory exam grossly normal, mentation intact, and speech slightly garbled.  Slight left sided facial slant with smiling.  Left shoulder lower than right shoulder with shoulder shrug.  PSYCH: mentation appears normal, affect normal/bright

## 2024-01-08 DIAGNOSIS — I63.311 CEREBROVASCULAR ACCIDENT (CVA) DUE TO THROMBOSIS OF RIGHT MIDDLE CEREBRAL ARTERY (H): ICD-10-CM

## 2024-01-08 RX ORDER — ASPIRIN 325 MG
325 TABLET ORAL DAILY
Qty: 100 TABLET | Refills: 2 | Status: SHIPPED | OUTPATIENT
Start: 2024-01-08

## 2024-02-25 DIAGNOSIS — I10 HYPERTENSION GOAL BP (BLOOD PRESSURE) < 140/90: ICD-10-CM

## 2024-02-26 RX ORDER — AMLODIPINE BESYLATE 10 MG/1
10 TABLET ORAL DAILY
Qty: 30 TABLET | Refills: 0 | Status: SHIPPED | OUTPATIENT
Start: 2024-02-26 | End: 2024-05-17

## 2024-02-28 DIAGNOSIS — I63.311 CEREBROVASCULAR ACCIDENT (CVA) DUE TO THROMBOSIS OF RIGHT MIDDLE CEREBRAL ARTERY (H): ICD-10-CM

## 2024-02-28 RX ORDER — ATORVASTATIN CALCIUM 40 MG/1
40 TABLET, FILM COATED ORAL DAILY
Qty: 90 TABLET | Refills: 0 | Status: SHIPPED | OUTPATIENT
Start: 2024-02-28 | End: 2024-05-17

## 2024-03-21 DIAGNOSIS — I63.311 CEREBROVASCULAR ACCIDENT (CVA) DUE TO THROMBOSIS OF RIGHT MIDDLE CEREBRAL ARTERY (H): ICD-10-CM

## 2024-03-24 RX ORDER — METHOCARBAMOL 500 MG/1
500 TABLET, FILM COATED ORAL 2 TIMES DAILY
Qty: 60 TABLET | Refills: 0 | Status: SHIPPED | OUTPATIENT
Start: 2024-03-24 | End: 2024-05-17

## 2024-04-29 ENCOUNTER — PATIENT OUTREACH (OUTPATIENT)
Dept: FAMILY MEDICINE | Facility: CLINIC | Age: 69
End: 2024-04-29
Payer: COMMERCIAL

## 2024-04-29 NOTE — TELEPHONE ENCOUNTER
Patient Quality Outreach    Patient is due for the following:   Colon Cancer Screening  Breast Cancer Screening - Mammogram    Next Steps:   Schedule a office visit for orders    Type of outreach:    Sent letter.      Questions for provider review:    None           Kassy Schuster, CMA

## 2024-04-29 NOTE — LETTER
April 29, 2024    To  Melissa Ivonne  [address]  [place] [address]    Your team at [facility] cares about your health. We have reviewed your chart and based on our findings; we are making the following recommendations to better manage your health.     You are in particular need of attention regarding the following:     Call or MyChart message your clinic to schedule a colonoscopy, schedule/ a FIT Test, or order a Cologuard test. If you are unsure what type of test you need, please call your clinic and speak to clinic staff.   Colon cancer is now the second leading cause of cancer-related deaths in the [place] for both men and women and there are over 130,000 new cases and 50,000 deaths per year from colon cancer. Colonoscopies can prevent 90-95% of these deaths. Problem lesions can be removed before they ever become cancer. This test is not only looking for cancer, but also getting rid of precancerous lesions.   If you are under/uninsured, we recommend you contact the [employer] Program.[employer] is a free colorectal cancer screening program that provides colonoscopies for eligible under/uninsured [place] men and women. If you are interested in receiving a free colonoscopy, please call [employer] at t [phone] (mention code [employer]) to see if you're eligible. Please have them send us the results.   Schedule Annual MAMMOGRAPHY. [facility] scheduling number is [phone] or schedule in MyChart (self referral).    If you have already completed these items, please contact the clinic via phone or   [employer] so your care team can review and update your records. Thank you for   choosing [facility] for your healthcare needs. For any questions,   concerns, or to schedule an appointment please contact our clinic.    Healthy Regards,      Your [facility] Care Team

## 2024-05-17 DIAGNOSIS — I10 HYPERTENSION GOAL BP (BLOOD PRESSURE) < 140/90: ICD-10-CM

## 2024-05-17 DIAGNOSIS — I63.311 CEREBROVASCULAR ACCIDENT (CVA) DUE TO THROMBOSIS OF RIGHT MIDDLE CEREBRAL ARTERY (H): ICD-10-CM

## 2024-05-17 RX ORDER — METHOCARBAMOL 500 MG/1
500 TABLET, FILM COATED ORAL 2 TIMES DAILY
Qty: 60 TABLET | Refills: 0 | Status: SHIPPED | OUTPATIENT
Start: 2024-05-17 | End: 2024-07-25

## 2024-05-17 RX ORDER — LISINOPRIL 20 MG/1
20 TABLET ORAL DAILY
Qty: 90 TABLET | Refills: 1 | Status: SHIPPED | OUTPATIENT
Start: 2024-05-17

## 2024-05-17 RX ORDER — AMLODIPINE BESYLATE 10 MG/1
10 TABLET ORAL DAILY
Qty: 30 TABLET | Refills: 0 | Status: SHIPPED | OUTPATIENT
Start: 2024-05-17

## 2024-05-17 RX ORDER — ATORVASTATIN CALCIUM 40 MG/1
40 TABLET, FILM COATED ORAL DAILY
Qty: 90 TABLET | Refills: 0 | Status: SHIPPED | OUTPATIENT
Start: 2024-05-17 | End: 2024-09-23

## 2024-06-17 ENCOUNTER — OFFICE VISIT (OUTPATIENT)
Dept: FAMILY MEDICINE | Facility: CLINIC | Age: 69
End: 2024-06-17
Payer: COMMERCIAL

## 2024-06-17 VITALS
HEIGHT: 62 IN | BODY MASS INDEX: 35.88 KG/M2 | WEIGHT: 195 LBS | OXYGEN SATURATION: 95 % | HEART RATE: 68 BPM | RESPIRATION RATE: 16 BRPM | TEMPERATURE: 96.9 F | DIASTOLIC BLOOD PRESSURE: 56 MMHG | SYSTOLIC BLOOD PRESSURE: 133 MMHG

## 2024-06-17 DIAGNOSIS — M17.0 PRIMARY OSTEOARTHRITIS OF BOTH KNEES: Primary | ICD-10-CM

## 2024-06-17 DIAGNOSIS — M25.562 CHRONIC PAIN OF BOTH KNEES: ICD-10-CM

## 2024-06-17 DIAGNOSIS — G89.29 CHRONIC PAIN OF BOTH KNEES: ICD-10-CM

## 2024-06-17 DIAGNOSIS — M25.561 CHRONIC PAIN OF BOTH KNEES: ICD-10-CM

## 2024-06-17 PROCEDURE — 99213 OFFICE O/P EST LOW 20 MIN: CPT | Performed by: FAMILY MEDICINE

## 2024-06-17 ASSESSMENT — PATIENT HEALTH QUESTIONNAIRE - PHQ9
10. IF YOU CHECKED OFF ANY PROBLEMS, HOW DIFFICULT HAVE THESE PROBLEMS MADE IT FOR YOU TO DO YOUR WORK, TAKE CARE OF THINGS AT HOME, OR GET ALONG WITH OTHER PEOPLE: NOT DIFFICULT AT ALL
SUM OF ALL RESPONSES TO PHQ QUESTIONS 1-9: 3
SUM OF ALL RESPONSES TO PHQ QUESTIONS 1-9: 3

## 2024-06-17 ASSESSMENT — PAIN SCALES - GENERAL: PAINLEVEL: WORST PAIN (10)

## 2024-06-17 ASSESSMENT — ENCOUNTER SYMPTOMS: LEG PAIN: 1

## 2024-06-17 NOTE — PROGRESS NOTES
"  Assessment & Plan   Melissa Coronado is a 69 year old female who presents today for evaluation of bilateral knee pain has been going on for a long time.  She denies any recent injury or trauma.  Exam showed no deformity of the knees, no current concern for fracture.  Will defer imaging at this point.  Symptoms likely related to primary osteoarthritis of both knees and will need local injection.  She was  referred to sports medicine for knee injection.  Primary osteoarthritis of both knees    - Orthopedic  Referral; Future    Chronic pain of both knees            BMI  Estimated body mass index is 35.67 kg/m  as calculated from the following:    Height as of this encounter: 1.575 m (5' 2\").    Weight as of this encounter: 88.5 kg (195 lb).   Weight management plan: Discussed healthy diet and exercise guidelines      Work on weight loss  Regular exercise    Madison Torres is a 69 year old, presenting for the following health issues:  No chief complaint on file.        6/17/2024     9:44 AM   Additional Questions   Roomed by Kassy FRAGA   Accompanied by SELF     History of Present Illness       Reason for visit:  Pains in both legs    She eats 0-1 servings of fruits and vegetables daily.She consumes 1 sweetened beverage(s) daily.She exercises with enough effort to increase her heart rate 20 to 29 minutes per day.  She exercises with enough effort to increase her heart rate 3 or less days per week.   She is taking medications regularly.                 Review of Systems  Constitutional, HEENT, cardiovascular, pulmonary, gi and gu systems are negative, except as otherwise noted.      Objective    LMP  (LMP Unknown)   There is no height or weight on file to calculate BMI.  Physical Exam  Vitals and nursing note reviewed.   Constitutional:       General: She is not in acute distress.     Appearance: Normal appearance. She is not ill-appearing, toxic-appearing or diaphoretic.   Musculoskeletal:      Right knee: No " swelling, deformity, effusion, erythema, ecchymosis, lacerations or bony tenderness. Normal range of motion. No tenderness.      Left knee: No swelling, deformity, effusion, erythema, ecchymosis, lacerations or bony tenderness. Normal range of motion. No tenderness.   Neurological:      Mental Status: She is alert.                    Signed Electronically by: Esperanza Buck MD

## 2024-06-28 NOTE — PROGRESS NOTES
Melissa Coronado  :  1955  DOS: 2024  MRN: [id number]  PCP: Esperanza Buck    Sports Medicine Clinic Visit    HPI  Melissa Coronado is a 69 year old female who is seen in consultation at the request of  Esperanza Buck M.D. presenting with bilateral knee pain.    - Mechanism of Injury:    - Insidious onset  - Pertinent history and prior evaluations:    - 24 Dr. Buck: Bilateral knee pain. Referred for possible knee injections.     - 22: Bilateral knee injections done with radiology department  - 22 bilateral knee xray shows mild to moderate degenerative changes of the patellofemoral compartments bilaterally with small effusions.   -  Impression :  Osteoarthritis bilateral.   1. Evidence of suprapatellar synovial effusion on both sides.   2. No fracture or dislocation.     - 17 Dr. Davenport: 2 years of pain. Bilateral corticosteroid knee injections and aspiration done under ultrasound guidance.     - 6/3/16 Dr. Parson: Year of pain. Bilateral corticosteroid injections performed in clinic. 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  Bilateral knee pain is worse than any pain in the rest of the leg and seems independent from the posterior leg pain.  - Denies:    - clicking/popping, numbness, tingling, mechanical locking symptoms, instability  - Alleviating factors:    - injections (did help at first but last injection only helped for a very short time).  - Aggravating factors:    - weight bearing, stairs, getting in & out of chairs  - Other treatments tried:    -  injection  last year, physical therapy, walker    - Patient Goals:    -  wanting another injection   - Social History:   - Retired.  Previous work:  Unsure      Review of Systems  Musculoskeletal: as above  Remainder of review of systems is negative including constitutional, CV, pulmonary, GI, Skin and Neurologic except as noted in HPI or medical history.    No past medical history on file.  No past surgical history on file.  Family History   Problem Relation Age of Onset    Hernia Brother     Other - See Comments Son         suffers from sneezing disorder         Objective  LMP  (LMP Unknown)     General: healthy, alert and in no acute distress.    HEENT: no scleral icterus or conjunctival erythema.   Skin: no suspicious lesions or rash. No jaundice.   CV: regular rhythm by palpation, 2+ distal pulses.  Resp: normal respiratory effort without conversational dyspnea.   Psych: normal mood and affect.    Gait: antalgic favoring the right leg more than the left leg, 4 wheeled walker, appropriate coordination and balance, slow titus    Neuro:        - Sensation to light touch:    - Intact throughout the BLE including all peripheral nerve distributions.        - MSR:      RLE  LLE  - Patella 2+ 2+  - Achilles 2+ 2+       - Special tests:   - Slump/SLR:  Neg    MSK - Knee:       - Inspection:    -Mild swelling bilaterally without surrounding erythema, warmth, ecchymosis, lesion.        - ROM:    - Full AROM/PROM with anterior knee pain with knee flexion/extension.       - Palpation:    - TTP at the medial joint line, lateral joint line bilaterally.   - NTTP elsewhere.        - Strength:  (*antalgic)   - Knee Flexion  5-*   - Knee Extension 5-*         - Special tests:        - Lachman:  Neg   - Maria Elena:  Pos for generalized knee pain.      - Varus stress:  Neg for laxity or pain     - Valgus stress:  Neg for laxity or pain    - Patellar grind:  Pos      Radiology  I independently reviewed the available relevant imaging in the  chart with my interpretations as above in HPI.     I independently reviewed today's new relevant imaging, with the following interpretation:  - XR B/L knees 7/5/2024 shows severe degenerative changes of the lateral compartment, moderate degenerative changes of the patellofemoral compartment and moderate degenerative changes of the medial compartment on the right with a small knee joint effusion, no acute fractures or dislocations.  On the left, there is moderate degenerative changes through tricompartments with a small knee joint effusion, no acute fractures or dislocations.  - XR lumbar spine 7/5/2024 shows normal anatomic alignment without spondylolisthesis.  There is multilevel degenerative disc disease, worse at L4-5.  No acute fracture.      Procedure  Large Joint Injection/Arthocentesis: bilateral knee    Date/Time: 7/5/2024 3:42 PM    Performed by: Casey Bailey DO  Authorized by: Casey Bailey DO    Indications:  Pain  Needle Size:  22 G  Guidance: landmark guided    Location:  Knee  Laterality:  Bilateral      Medications (Right):  40 mg triamcinolone 40 MG/ML; 2 mL BUPivacaine HCl (PF) 0.25 %  Medications (Left):  40 mg triamcinolone 40 MG/ML; 2 mL BUPivacaine HCl (PF) 0.25 %  Outcome:  Tolerated well, no immediate complications  Procedure discussed: discussed risks, benefits, and alternatives    Consent Given by:  Patient  Timeout: timeout called immediately prior to procedure    Prep: patient was prepped and draped in usual sterile fashion      Intraarticular Knee Joint Injection   The patient was informed of the risks and benefits of the procedure and alternatives were discussed. A written consent was signed by the patient.   The injection site was prepped with chlorhexidine in sterile fashion.   An injectate solution containing 2 mL of 1% lidocaine, 2 mL of 0.5% bupivacaine, and 1 mL of Kenalog (40 mg/mL) was drawn up into a 5 mL syringe.  Injection was performed using sterile technique.  A  1.5-inch 22-gauge needle was used to enter the knee joint using a lateral infrapatellar approach and injectate was injected successfully. After the injection, the site was cleaned and a bandage applied. The patient tolerated the procedure well without complications. The procedure was duplicated on the contralateral side using an identical protocol.     Assessment  1. Primary osteoarthritis of both knees    2. Lumbar spondylosis        Plan  Melissa Coronado is a pleasant 69 year old female that presents with chronic bilateral knee pain.  She feels a significant amount of pain throughout the knee as at the medial and lateral joint lines that is worse with weightbearing and other activities.  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  She does also endorse some radiating pain down the bilateral lower extremities in the L5/S1 distribution, however this is intermittent and independent of her knee pain.  She does endorse some numbness/tingling of her toes bilaterally that she also feels in her fingers.  She has a significant history of prediabetes with last HbA1c 6.4%.  Denies neurologic red flag symptoms and exam today did not reveal a positive slump test or other signs of acute lumbosacral radiculopathy.  Radiographs also demonstrate stability of her lumbar spondylosis without spondylolisthesis.  Bilateral knee radiographs reveal moderate to severe osteoarthritic changes, right worse than left, which seems very correlated to her most concerning pain.  She has received multiple corticosteroid injections for the knees which do provide some relief of the knee pain and this is what she is hoping to receive today.    We discussed the nature of the condition and available treatment options, and mutually agreed upon the following plan:    - Imaging:          - Reviewed and independently interpreted the relevant imaging in the chart, including any imaging ordered for today's clinic.  -  Reviewed results and images with patient.   - Medications:          - Discussed pharmacologic options for pain relief.   - May use NSAIDs (Ibuprofen, Naproxen) or Acetaminophen (Tylenol) as needed for pain control. Do not take these if previously advised to avoid them for other medical conditions.  - May also use topical medications such as lidocaine, IcyHot, BioFreeze, or Voltaren gel as needed for pain control. Voltaren gel is an anti-inflammatory cream that may be used up to 4 times per day over the painful area.   - Injections:          - Discussed possible injection options and alternatives.    - Injection options include: Intra-articular knee joint injection with corticosteroid, viscosupplementation, or PRP.  She has gotten moderate relief from corticosteroid injections in the past and is hopeful for corticosteroid injections today.  - Performed a corticosteroid injection of the bilateral knee joints today in clinic. Patient tolerated the procedure well without complications.     - Post-procedure instructions:    - Keep the injection site clean and dry.   - Do not submerge the injection site for 24 hours (no baths, pools). Showers are ok.   - Rest the area for 24-48 hours before resuming normal activities. Avoid overexerting the area for the first few weeks.   - It may take 2-3 days to start noticing the effects of the injection and up to 3-4 weeks to feel significant benefits.   - Therapy:          - Discussed the benefits of therapy vs home exercise program for optimization of range of motion, flexibility, strength, stability and function.   - Preference is for a home exercise program.   - Home Exercise Program given today in clinic and recommendation given to perform HEP daily and after exacerbations.  - Modalities:          - May use ice, heat, massage or other modalities as needed.   - Bracing:          - Discussed bracing options and may consider using knee stability bracing with over-the-counter braces  as needed for ambulation and weightbearing.  May contact clinic if she needs a brace.  - Surgery:          - Discussed non-operative and operative treatment options for the patient's condition.   - Goal is to continue conservative care for as long as possible before surgical intervention would need to be considered.  - Activity:          - Encouraged to remain active and participate in regular activities as symptoms allow.   Avoid or modify exacerbating activities as needed.  - Follow up:          - As needed for re-evaluation and update to treatment plan.  - May follow up sooner for new/worsening symptoms.  - May contact clinic by phone or MyChart for questions or concerns.       Casey Bailey DO, SHENA  [facility] Sports Medicine  [facility] - Department of Orthopedic Surgery       Disclaimer:  This note was prepared and written using Dragon Medical dictation software. As a result, there may be errors in the script that have gone undetected. Please consider this when interpreting the information in this note.

## 2024-07-05 ENCOUNTER — OFFICE VISIT (OUTPATIENT)
Dept: ORTHOPEDICS | Facility: CLINIC | Age: 69
End: 2024-07-05
Attending: FAMILY MEDICINE
Payer: COMMERCIAL

## 2024-07-05 ENCOUNTER — ANCILLARY PROCEDURE (OUTPATIENT)
Dept: GENERAL RADIOLOGY | Facility: CLINIC | Age: 69
End: 2024-07-05
Attending: STUDENT IN AN ORGANIZED HEALTH CARE EDUCATION/TRAINING PROGRAM
Payer: COMMERCIAL

## 2024-07-05 DIAGNOSIS — M54.50 LOW BACK PAIN: ICD-10-CM

## 2024-07-05 DIAGNOSIS — M17.0 PRIMARY OSTEOARTHRITIS OF BOTH KNEES: ICD-10-CM

## 2024-07-05 DIAGNOSIS — M17.0 PRIMARY OSTEOARTHRITIS OF BOTH KNEES: Primary | ICD-10-CM

## 2024-07-05 DIAGNOSIS — M47.816 LUMBAR SPONDYLOSIS: ICD-10-CM

## 2024-07-05 PROCEDURE — 73564 X-RAY EXAM KNEE 4 OR MORE: CPT | Mod: RT | Performed by: RADIOLOGY

## 2024-07-05 PROCEDURE — 72100 X-RAY EXAM L-S SPINE 2/3 VWS: CPT | Performed by: RADIOLOGY

## 2024-07-05 PROCEDURE — 99204 OFFICE O/P NEW MOD 45 MIN: CPT | Mod: 25 | Performed by: STUDENT IN AN ORGANIZED HEALTH CARE EDUCATION/TRAINING PROGRAM

## 2024-07-05 PROCEDURE — 20610 DRAIN/INJ JOINT/BURSA W/O US: CPT | Mod: 50 | Performed by: STUDENT IN AN ORGANIZED HEALTH CARE EDUCATION/TRAINING PROGRAM

## 2024-07-05 RX ORDER — BUPIVACAINE HYDROCHLORIDE 2.5 MG/ML
2 INJECTION, SOLUTION EPIDURAL; INFILTRATION; INTRACAUDAL
Status: SHIPPED | OUTPATIENT
Start: 2024-07-05

## 2024-07-05 RX ORDER — TRIAMCINOLONE ACETONIDE 40 MG/ML
40 INJECTION, SUSPENSION INTRA-ARTICULAR; INTRAMUSCULAR
Status: SHIPPED | OUTPATIENT
Start: 2024-07-05

## 2024-07-05 RX ADMIN — TRIAMCINOLONE ACETONIDE 40 MG: 40 INJECTION, SUSPENSION INTRA-ARTICULAR; INTRAMUSCULAR at 15:42

## 2024-07-05 RX ADMIN — BUPIVACAINE HYDROCHLORIDE 2 ML: 2.5 INJECTION, SOLUTION EPIDURAL; INFILTRATION; INTRACAUDAL at 15:42

## 2024-07-05 NOTE — LETTER
2024      Melissa Coronado  [address]  [place] [address]      Dear Colleague,    Thank you for referring your patient, Melissa Coronado, to the [facility] [place]. Please see a copy of my visit note below.    Melissa Coronado  :  1955  DOS: 2024  MRN: [id number]  PCP: Cielo Mosque    Sports Medicine Clinic Visit    HPI  Melissa Coronado is a 69 year old female who is seen in consultation at the request of  Esperanza Buck M.D. presenting with bilateral knee pain.    - Mechanism of Injury:    - Insidious onset  - Pertinent history and prior evaluations:    - 24 Dr. Buck: Bilateral knee pain. Referred for possible knee injections.     - 22: Bilateral knee injections done with radiology department  - 22 bilateral knee xray shows mild to moderate degenerative changes of the patellofemoral compartments bilaterally with small effusions.   -  Impression :  Osteoarthritis bilateral.   1. Evidence of suprapatellar synovial effusion on both sides.   2. No fracture or dislocation.     - 17 Dr. Davenport: 2 years of pain. Bilateral corticosteroid knee injections and aspiration done under ultrasound guidance.     - 6/3/16 Dr. Parson: Year of pain. Bilateral corticosteroid injections performed in clinic. 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  Bilateral knee pain is worse than any pain in the rest of the leg and seems independent from the posterior leg pain.  - Denies:    - clicking/popping, numbness, tingling, mechanical locking symptoms,  instability  - Alleviating factors:    - injections (did help at first but last injection only helped for a very short time).  - Aggravating factors:    - weight bearing, stairs, getting in & out of chairs  - Other treatments tried:    -  injection last year, physical therapy, walker    - Patient Goals:    -  wanting another injection   - Social History:   - Retired.  Previous work:  Unsure      Review of Systems  Musculoskeletal: as above  Remainder of review of systems is negative including constitutional, CV, pulmonary, GI, Skin and Neurologic except as noted in HPI or medical history.    No past medical history on file.  No past surgical history on file.  Family History   Problem Relation Age of Onset     Hernia Brother      Other - See Comments Son         suffers from sneezing disorder         Objective  LMP  (LMP Unknown)     General: healthy, alert and in no acute distress.    HEENT: no scleral icterus or conjunctival erythema.   Skin: no suspicious lesions or rash. No jaundice.   CV: regular rhythm by palpation, 2+ distal pulses.  Resp: normal respiratory effort without conversational dyspnea.   Psych: normal mood and affect.    Gait: antalgic favoring the right leg more than the left leg, 4 wheeled walker, appropriate coordination and balance, slow titus    Neuro:        - Sensation to light touch:    - Intact throughout the BLE including all peripheral nerve distributions.        - MSR:      RLE  LLE  - Patella 2+ 2+  - Achilles 2+ 2+       - Special tests:   - Slump/SLR:  Neg    MSK - Knee:       - Inspection:    -Mild swelling bilaterally without surrounding erythema, warmth, ecchymosis, lesion.        - ROM:    - Full AROM/PROM with anterior knee pain with knee flexion/extension.       - Palpation:    - TTP at the medial joint line, lateral joint line bilaterally.   - NTTP elsewhere.        - Strength:  (*antalgic)   - Knee Flexion  5-*   - Knee Extension 5-*         - Special tests:        -  Lachman:  Neg   - Maria Elena:  Pos for generalized knee pain.      - Varus stress:  Neg for laxity or pain     - Valgus stress:  Neg for laxity or pain    - Patellar grind:  Pos      Radiology  I independently reviewed the available relevant imaging in the chart with my interpretations as above in HPI.     I independently reviewed today's new relevant imaging, with the following interpretation:  - XR B/L knees 7/5/2024 shows severe degenerative changes of the lateral compartment, moderate degenerative changes of the patellofemoral compartment and moderate degenerative changes of the medial compartment on the right with a small knee joint effusion, no acute fractures or dislocations.  On the left, there is moderate degenerative changes through tricompartments with a small knee joint effusion, no acute fractures or dislocations.  - XR lumbar spine 7/5/2024 shows normal anatomic alignment without spondylolisthesis.  There is multilevel degenerative disc disease, worse at L4-5.  No acute fracture.      Procedure  Large Joint Injection/Arthocentesis: bilateral knee    Date/Time: 7/5/2024 3:42 PM    Performed by: Casey Bailey DO  Authorized by: Casey Bailey DO    Indications:  Pain  Needle Size:  22 G  Guidance: landmark guided    Location:  Knee  Laterality:  Bilateral      Medications (Right):  40 mg triamcinolone 40 MG/ML; 2 mL BUPivacaine HCl (PF) 0.25 %  Medications (Left):  40 mg triamcinolone 40 MG/ML; 2 mL BUPivacaine HCl (PF) 0.25 %  Outcome:  Tolerated well, no immediate complications  Procedure discussed: discussed risks, benefits, and alternatives    Consent Given by:  Patient  Timeout: timeout called immediately prior to procedure    Prep: patient was prepped and draped in usual sterile fashion      Intraarticular Knee Joint Injection   The patient was informed of the risks and benefits of the procedure and alternatives were discussed. A written consent was signed by the patient.   The injection site was  prepped with chlorhexidine in sterile fashion.   An injectate solution containing 2 mL of 1% lidocaine, 2 mL of 0.5% bupivacaine, and 1 mL of Kenalog (40 mg/mL) was drawn up into a 5 mL syringe.  Injection was performed using sterile technique.  A 1.5-inch 22-gauge needle was used to enter the knee joint using a lateral infrapatellar approach and injectate was injected successfully. After the injection, the site was cleaned and a bandage applied. The patient tolerated the procedure well without complications. The procedure was duplicated on the contralateral side using an identical protocol.     Assessment  1. Primary osteoarthritis of both knees    2. Lumbar spondylosis        Plan  Melissa Coronado is a pleasant 69 year old female that presents with chronic bilateral knee pain.  She feels a significant amount of pain throughout the knee as at the medial and lateral joint lines that is worse with weightbearing and other activities.  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  She does also endorse some radiating pain down the bilateral lower extremities in the L5/S1 distribution, however this is intermittent and independent of her knee pain.  She does endorse some numbness/tingling of her toes bilaterally that she also feels in her fingers.  She has a significant history of prediabetes with last HbA1c 6.4%.  Denies neurologic red flag symptoms and exam today did not reveal a positive slump test or other signs of acute lumbosacral radiculopathy.  Radiographs also demonstrate stability of her lumbar spondylosis without spondylolisthesis.  Bilateral knee radiographs reveal moderate to severe osteoarthritic changes, right worse than left, which seems very correlated to her most concerning pain.  She has received multiple corticosteroid injections for the knees which do provide some relief of the knee pain and this is what she is hoping to receive today.    We discussed the nature of  the condition and available treatment options, and mutually agreed upon the following plan:    - Imaging:          - Reviewed and independently interpreted the relevant imaging in the chart, including any imaging ordered for today's clinic.  - Reviewed results and images with patient.   - Medications:          - Discussed pharmacologic options for pain relief.   - May use NSAIDs (Ibuprofen, Naproxen) or Acetaminophen (Tylenol) as needed for pain control. Do not take these if previously advised to avoid them for other medical conditions.  - May also use topical medications such as lidocaine, IcyHot, BioFreeze, or Voltaren gel as needed for pain control. Voltaren gel is an anti-inflammatory cream that may be used up to 4 times per day over the painful area.   - Injections:          - Discussed possible injection options and alternatives.    - Injection options include: Intra-articular knee joint injection with corticosteroid, viscosupplementation, or PRP.  She has gotten moderate relief from corticosteroid injections in the past and is hopeful for corticosteroid injections today.  - Performed a corticosteroid injection of the bilateral knee joints today in clinic. Patient tolerated the procedure well without complications.     - Post-procedure instructions:    - Keep the injection site clean and dry.   - Do not submerge the injection site for 24 hours (no baths, pools). Showers are ok.   - Rest the area for 24-48 hours before resuming normal activities. Avoid overexerting the area for the first few weeks.   - It may take 2-3 days to start noticing the effects of the injection and up to 3-4 weeks to feel significant benefits.   - Therapy:          - Discussed the benefits of therapy vs home exercise program for optimization of range of motion, flexibility, strength, stability and function.   - Preference is for a home exercise program.   - Home Exercise Program given today in clinic and recommendation given to perform  HEP daily and after exacerbations.  - Modalities:          - May use ice, heat, massage or other modalities as needed.   - Bracing:          - Discussed bracing options and may consider using knee stability bracing with over-the-counter braces as needed for ambulation and weightbearing.  May contact clinic if she needs a brace.  - Surgery:          - Discussed non-operative and operative treatment options for the patient's condition.   - Goal is to continue conservative care for as long as possible before surgical intervention would need to be considered.  - Activity:          - Encouraged to remain active and participate in regular activities as symptoms allow.   Avoid or modify exacerbating activities as needed.  - Follow up:          - As needed for re-evaluation and update to treatment plan.  - May follow up sooner for new/worsening symptoms.  - May contact clinic by phone or MyChart for questions or concerns.       Casey Bailey DO, CABEKAH  [facility] - Sports Medicine  [facility] - Department of Orthopedic Surgery       Disclaimer:  This note was prepared and written using Dragon Medical dictation software. As a result, there may be errors in the script that have gone undetected. Please consider this when interpreting the information in this note.       Again, thank you for allowing me to participate in the care of your patient.        Sincerely,        Casey Bailey DO

## 2024-07-21 DIAGNOSIS — I63.311 CEREBROVASCULAR ACCIDENT (CVA) DUE TO THROMBOSIS OF RIGHT MIDDLE CEREBRAL ARTERY (H): ICD-10-CM

## 2024-07-25 RX ORDER — METHOCARBAMOL 500 MG/1
500 TABLET, FILM COATED ORAL 2 TIMES DAILY
Qty: 60 TABLET | Refills: 0 | Status: SHIPPED | OUTPATIENT
Start: 2024-07-25 | End: 2024-09-23

## 2024-09-22 DIAGNOSIS — I63.311 CEREBROVASCULAR ACCIDENT (CVA) DUE TO THROMBOSIS OF RIGHT MIDDLE CEREBRAL ARTERY (H): ICD-10-CM

## 2024-09-23 RX ORDER — ATORVASTATIN CALCIUM 40 MG/1
40 TABLET, FILM COATED ORAL DAILY
Qty: 90 TABLET | Refills: 0 | Status: SHIPPED | OUTPATIENT
Start: 2024-09-23

## 2024-09-23 RX ORDER — METHOCARBAMOL 500 MG/1
500 TABLET, FILM COATED ORAL 2 TIMES DAILY
Qty: 60 TABLET | Refills: 0 | Status: SHIPPED | OUTPATIENT
Start: 2024-09-23

## 2024-10-29 ENCOUNTER — MEDICAL CORRESPONDENCE (OUTPATIENT)
Dept: HEALTH INFORMATION MANAGEMENT | Facility: CLINIC | Age: 69
End: 2024-10-29

## 2024-11-05 ENCOUNTER — OFFICE VISIT (OUTPATIENT)
Dept: FAMILY MEDICINE | Facility: CLINIC | Age: 69
End: 2024-11-05
Payer: COMMERCIAL

## 2024-11-05 VITALS
SYSTOLIC BLOOD PRESSURE: 142 MMHG | HEIGHT: 62 IN | RESPIRATION RATE: 17 BRPM | TEMPERATURE: 97.4 F | OXYGEN SATURATION: 96 % | HEART RATE: 50 BPM | WEIGHT: 190.3 LBS | DIASTOLIC BLOOD PRESSURE: 84 MMHG | BODY MASS INDEX: 35.02 KG/M2

## 2024-11-05 DIAGNOSIS — R73.03 PREDIABETES: ICD-10-CM

## 2024-11-05 DIAGNOSIS — Z12.11 COLON CANCER SCREENING: ICD-10-CM

## 2024-11-05 DIAGNOSIS — R53.83 FATIGUE, UNSPECIFIED TYPE: ICD-10-CM

## 2024-11-05 DIAGNOSIS — Z12.31 ENCOUNTER FOR SCREENING MAMMOGRAM FOR BREAST CANCER: ICD-10-CM

## 2024-11-05 DIAGNOSIS — E55.9 VITAMIN D DEFICIENCY: ICD-10-CM

## 2024-11-05 DIAGNOSIS — I10 HYPERTENSION GOAL BP (BLOOD PRESSURE) < 140/90: Primary | ICD-10-CM

## 2024-11-05 PROBLEM — E66.01 MORBID OBESITY (H): Status: RESOLVED | Noted: 2017-08-22 | Resolved: 2024-11-05

## 2024-11-05 LAB
BASOPHILS # BLD AUTO: 0.1 10E3/UL (ref 0–0.2)
BASOPHILS NFR BLD AUTO: 1 %
EOSINOPHIL # BLD AUTO: 0.2 10E3/UL (ref 0–0.7)
EOSINOPHIL NFR BLD AUTO: 2 %
ERYTHROCYTE [DISTWIDTH] IN BLOOD BY AUTOMATED COUNT: 14.1 % (ref 10–15)
EST. AVERAGE GLUCOSE BLD GHB EST-MCNC: 120 MG/DL
HBA1C MFR BLD: 5.8 % (ref 0–5.6)
HCT VFR BLD AUTO: 44.2 % (ref 35–47)
HGB BLD-MCNC: 14.2 G/DL (ref 11.7–15.7)
IMM GRANULOCYTES # BLD: 0 10E3/UL
IMM GRANULOCYTES NFR BLD: 0 %
LYMPHOCYTES # BLD AUTO: 3.8 10E3/UL (ref 0.8–5.3)
LYMPHOCYTES NFR BLD AUTO: 51 %
MCH RBC QN AUTO: 28.6 PG (ref 26.5–33)
MCHC RBC AUTO-ENTMCNC: 32.1 G/DL (ref 31.5–36.5)
MCV RBC AUTO: 89 FL (ref 78–100)
MONOCYTES # BLD AUTO: 0.8 10E3/UL (ref 0–1.3)
MONOCYTES NFR BLD AUTO: 10 %
NEUTROPHILS # BLD AUTO: 2.7 10E3/UL (ref 1.6–8.3)
NEUTROPHILS NFR BLD AUTO: 36 %
NRBC # BLD AUTO: 0 10E3/UL
NRBC BLD AUTO-RTO: 0 /100
PLATELET # BLD AUTO: 201 10E3/UL (ref 150–450)
RBC # BLD AUTO: 4.97 10E6/UL (ref 3.8–5.2)
WBC # BLD AUTO: 7.5 10E3/UL (ref 4–11)

## 2024-11-05 PROCEDURE — 80061 LIPID PANEL: CPT | Performed by: FAMILY MEDICINE

## 2024-11-05 PROCEDURE — 82274 ASSAY TEST FOR BLOOD FECAL: CPT | Performed by: FAMILY MEDICINE

## 2024-11-05 PROCEDURE — 36415 COLL VENOUS BLD VENIPUNCTURE: CPT | Performed by: FAMILY MEDICINE

## 2024-11-05 PROCEDURE — 82306 VITAMIN D 25 HYDROXY: CPT | Performed by: FAMILY MEDICINE

## 2024-11-05 PROCEDURE — G2211 COMPLEX E/M VISIT ADD ON: HCPCS | Performed by: FAMILY MEDICINE

## 2024-11-05 PROCEDURE — 85025 COMPLETE CBC W/AUTO DIFF WBC: CPT | Performed by: FAMILY MEDICINE

## 2024-11-05 PROCEDURE — 83036 HEMOGLOBIN GLYCOSYLATED A1C: CPT | Performed by: FAMILY MEDICINE

## 2024-11-05 PROCEDURE — 84443 ASSAY THYROID STIM HORMONE: CPT | Performed by: FAMILY MEDICINE

## 2024-11-05 PROCEDURE — 80053 COMPREHEN METABOLIC PANEL: CPT | Performed by: FAMILY MEDICINE

## 2024-11-05 PROCEDURE — 99213 OFFICE O/P EST LOW 20 MIN: CPT | Performed by: FAMILY MEDICINE

## 2024-11-05 RX ORDER — DORZOLAMIDE HYDROCHLORIDE AND TIMOLOL MALEATE 20; 5 MG/ML; MG/ML
1 SOLUTION/ DROPS OPHTHALMIC 2 TIMES DAILY
COMMUNITY
Start: 2024-08-13

## 2024-11-05 ASSESSMENT — PAIN SCALES - GENERAL: PAINLEVEL_OUTOF10: MODERATE PAIN (5)

## 2024-11-05 NOTE — PATIENT INSTRUCTIONS
- Set up mammogram     - FIT test for colon cancer screen    - labs today     - no change in medications

## 2024-11-05 NOTE — LETTER
November 6, 2024      Melissa Coronado  [address] [facility] [address]  [place] [address]        Dear ,    We are writing to inform you of your test results.    The results are within the expected range. Please continue with the current plan of care and let us know if there are any questions or concerns.       Resulted Orders   Lipid panel reflex to direct LDL Non-fasting   Result Value Ref Range    Cholesterol 142 <200 mg/dL    Triglycerides 46 <150 mg/dL    Direct Measure HDL 67 >=50 mg/dL    LDL Cholesterol Calculated 66 <100 mg/dL    Non HDL Cholesterol 75 <130 mg/dL    Patient Fasting > 8hrs? No     Narrative    Cholesterol  Desirable: < 200 mg/dL  Borderline High: 200 - 239 mg/dL  High: >= 240 mg/dL    Triglycerides  Normal: < 150 mg/dL  Borderline High: 150 - 199 mg/dL  High: 200-499 mg/dL  Very High: >= 500 mg/dL    Direct Measure HDL  Female: >= 50 mg/dL   Male: >= 40 mg/dL    LDL Cholesterol  Desirable: < 100 mg/dL  Above Desirable: 100 - 129 mg/dL   Borderline High: 130 - 159 mg/dL   High:  160 - 189 mg/dL   Very High: >= 190 mg/dL    Non HDL Cholesterol  Desirable: < 130 mg/dL  Above Desirable: 130 - 159 mg/dL  Borderline High: 160 - 189 mg/dL  High: 190 - 219 mg/dL  Very High: >= 220 mg/dL   Fecal colorectal cancer screen FIT - Future (S+30)   Result Value Ref Range    Occult Blood Screen FIT Negative Negative   Comprehensive metabolic panel   Result Value Ref Range    Sodium 142 135 - 145 mmol/L    Potassium 4.1 3.4 - 5.3 mmol/L    Carbon Dioxide (CO2) 25 22 - 29 mmol/L    Anion Gap 11 7 - 15 mmol/L    Urea Nitrogen 13.2 8.0 - 23.0 mg/dL    Creatinine 0.64 0.51 - 0.95 mg/dL    GFR Estimate >90 >60 mL/min/1.73m2      Comment:      eGFR calculated using 2021 CKD-EPI equation.    Calcium 9.5 8.8 - 10.4 mg/dL      Comment:      Reference intervals for this test were updated on 7/16/2024 to reflect our healthy population more accurately. There may be differences in the flagging of prior results with similar  values performed with this method. Those prior results can be interpreted in the context of the updated reference intervals.    Chloride 106 98 - 107 mmol/L    Glucose 77 70 - 99 mg/dL    Alkaline Phosphatase 84 40 - 150 U/L    AST 24 0 - 45 U/L    ALT 16 0 - 50 U/L    Protein Total 7.1 6.4 - 8.3 g/dL    Albumin 4.1 3.5 - 5.2 g/dL    Bilirubin Total 0.5 <=1.2 mg/dL    Patient Fasting > 8hrs? No    Hemoglobin A1c   Result Value Ref Range    Estimated Average Glucose 120 (H) <117 mg/dL    Hemoglobin A1C 5.8 (H) 0.0 - 5.6 %      Comment:      Normal <5.7%   Prediabetes 5.7-6.4%    Diabetes 6.5% or higher     Note: Adopted from ADA consensus guidelines.   TSH with free T4 reflex   Result Value Ref Range    TSH 1.25 0.30 - 4.20 uIU/mL   Vitamin D Deficiency   Result Value Ref Range    Vitamin D, Total (25-Hydroxy) 25 20 - 50 ng/mL      Comment:      optimum levels    Narrative    Season, race, dietary intake, and treatment affect the concentration of 25-hydroxy-Vitamin D. Values may decrease during winter months and increase during summer months.    Vitamin D determination is routinely performed by an immunoassay specific for 25 hydroxyvitamin D3.  If an individual is on vitamin D2(ergocalciferol) supplementation, please specify 25 OH vitamin D2 and D3 level determination by LCMSMS test VITD23.     CBC with platelets and differential   Result Value Ref Range    WBC Count 7.5 4.0 - 11.0 10e3/uL    RBC Count 4.97 3.80 - 5.20 10e6/uL    Hemoglobin 14.2 11.7 - 15.7 g/dL    Hematocrit 44.2 35.0 - 47.0 %    MCV 89 78 - 100 fL    MCH 28.6 26.5 - 33.0 pg    MCHC 32.1 31.5 - 36.5 g/dL    RDW 14.1 10.0 - 15.0 %    Platelet Count 201 150 - 450 10e3/uL    % Neutrophils 36 %    % Lymphocytes 51 %    % Monocytes 10 %    % Eosinophils 2 %    % Basophils 1 %    % Immature Granulocytes 0 %    NRBCs per 100 WBC 0 <1 /100    Absolute Neutrophils 2.7 1.6 - 8.3 10e3/uL    Absolute Lymphocytes 3.8 0.8 - 5.3 10e3/uL    Absolute Monocytes 0.8  0.0 - 1.3 10e3/uL    Absolute Eosinophils 0.2 0.0 - 0.7 10e3/uL    Absolute Basophils 0.1 0.0 - 0.2 10e3/uL    Absolute Immature Granulocytes 0.0 <=0.4 10e3/uL    Absolute NRBCs 0.0 10e3/uL     If you have any questions or concerns, please call the clinic at the number listed above.     Sincerely,        Esperanza Buck MD/bmc

## 2024-11-05 NOTE — PROGRESS NOTES
Assessment & Plan     Hypertension goal BP (blood pressure) < 140/90  Patient has longstanding history of HTN , currently denies any symptoms referable to elevated blood pressure. Specifically denies chest pain, palpitations, dyspnea, orthopnea, PND or peripheral edema. Blood pressure readings have been in normal range.   Current  medication regimen : Norvasc 10 mg, lisinopril 20 mg  Patient denies any side effects of medication.   Continue the same medications    - Lipid panel reflex to direct LDL Non-fasting; Future  - Lipid panel reflex to direct LDL Non-fasting    Fatigue, unspecified type  Unclear etiology.  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  Check thyroid labs and CBC  - CBC with Platelets & Differential; Future  - Comprehensive metabolic panel; Future  - TSH with free T4 reflex; Future  - CBC with Platelets & Differential  - Comprehensive metabolic panel  - TSH with free T4 reflex    Encounter for screening mammogram for breast cancer    - MA Screening Bilateral w/ Isaias; Future    Colon cancer screening    - Fecal colorectal cancer screen FIT - Future (S+30); Future  - Fecal colorectal cancer screen FIT - Future (S+30)    Prediabetes    - Hemoglobin A1c; Future  - Hemoglobin A1c    Vitamin D deficiency    - Vitamin D Deficiency; Future  - Vitamin D Deficiency        The longitudinal plan of care for the diagnosis(es)/condition(s) as documented were addressed during this visit. Due to the added complexity in care, I will continue to support Melissa in the subsequent management and with ongoing continuity of care.        Subjective   Melissa is a 69 year old, presenting for the following health issues:  Fatigue, Dizziness, and Pain (Back of head, neck, LFT arm )        11/5/2024     2:26 PM   Additional Questions   Roomed by John GOINS LPN   Accompanied by Self         11/5/2024     2:26 PM   Patient Reported Additional Medications   Patient reports taking the following new medications None  "    History of Present Illness       Reason for visit:  RT arm pain, back of head, neck  Symptom onset:  3-4 weeks ago  Symptoms include:  Pain bending to pick something up but goes away when sitting up. Had xray and couldnt find anything  Symptom intensity:  Mild  Symptom progression:  Staying the same  Had these symptoms before:  No   She is taking medications regularly.             CVA:  Had stroke 04/07/2022   presents with a 3-day hx of left-sided RLE>RUE weakness and then a mechanical fall. In the ED, bp up to 229/86, HR 50-60s, CTA head revealed  moderate stenosis of the M2 branch of the left middle cerebral artery and High-grade stenosis of the P1 segment of the left posterior cerebral artery.  MRI revealed Acute/subacute infarct adjacent to the body of the right lateral ventricle. Neurology was consulted, started pt on  to continue and clopidogrel 75 mg x 21 days only  Residual left side weakness         Hypertension:  Norvasc 5 mg   Lisinopril 20 mg      Prediabetes            Lab Results   Component Value Date     A1C 6.1 01/08/2019     A1C 6.1 03/16/2018      Hyperlipidemia   on Atorvastatin 40 mg daily  - Goal LDL <70                   Objective    BP (!) 142/84   Pulse 50   Temp 97.4  F (36.3  C) (Tympanic)   Resp 17   Ht 1.575 m (5' 2\")   Wt 86.3 kg (190 lb 4.8 oz)   LMP  (LMP Unknown)   SpO2 96%   BMI 34.81 kg/m    Body mass index is 34.81 kg/m .  Physical Exam  Vitals and nursing note reviewed.   Constitutional:       General: She is not in acute distress.     Appearance: She is not ill-appearing, toxic-appearing or diaphoretic.   HENT:      Head: Normocephalic and atraumatic.   Cardiovascular:      Rate and Rhythm: Regular rhythm. Bradycardia present.      Pulses: Normal pulses.      Heart sounds: Normal heart sounds. No murmur heard.     No friction rub. No gallop.   Pulmonary:      Effort: No respiratory distress.      Breath sounds: No stridor. No wheezing or rhonchi.   Skin:     " Capillary Refill: Capillary refill takes less than 2 seconds.   Neurological:      Mental Status: She is alert.      Sensory: Sensory deficit present.      Motor: Weakness present.      Gait: Gait abnormal.                    Signed Electronically by: Esperanza Buck MD

## 2024-11-06 DIAGNOSIS — I10 HYPERTENSION GOAL BP (BLOOD PRESSURE) < 140/90: ICD-10-CM

## 2024-11-06 LAB
ALBUMIN SERPL BCG-MCNC: 4.1 G/DL (ref 3.5–5.2)
ALP SERPL-CCNC: 84 U/L (ref 40–150)
ALT SERPL W P-5'-P-CCNC: 16 U/L (ref 0–50)
ANION GAP SERPL CALCULATED.3IONS-SCNC: 11 MMOL/L (ref 7–15)
AST SERPL W P-5'-P-CCNC: 24 U/L (ref 0–45)
BILIRUB SERPL-MCNC: 0.5 MG/DL
BUN SERPL-MCNC: 13.2 MG/DL (ref 8–23)
CALCIUM SERPL-MCNC: 9.5 MG/DL (ref 8.8–10.4)
CHLORIDE SERPL-SCNC: 106 MMOL/L (ref 98–107)
CHOLEST SERPL-MCNC: 142 MG/DL
CREAT SERPL-MCNC: 0.64 MG/DL (ref 0.51–0.95)
EGFRCR SERPLBLD CKD-EPI 2021: >90 ML/MIN/1.73M2
FASTING STATUS PATIENT QL REPORTED: NO
FASTING STATUS PATIENT QL REPORTED: NO
GLUCOSE SERPL-MCNC: 77 MG/DL (ref 70–99)
HCO3 SERPL-SCNC: 25 MMOL/L (ref 22–29)
HDLC SERPL-MCNC: 67 MG/DL
HEMOCCULT STL QL IA: NEGATIVE
LDLC SERPL CALC-MCNC: 66 MG/DL
NONHDLC SERPL-MCNC: 75 MG/DL
POTASSIUM SERPL-SCNC: 4.1 MMOL/L (ref 3.4–5.3)
PROT SERPL-MCNC: 7.1 G/DL (ref 6.4–8.3)
SODIUM SERPL-SCNC: 142 MMOL/L (ref 135–145)
TRIGL SERPL-MCNC: 46 MG/DL
TSH SERPL DL<=0.005 MIU/L-ACNC: 1.25 UIU/ML (ref 0.3–4.2)
VIT D+METAB SERPL-MCNC: 25 NG/ML (ref 20–50)

## 2024-11-06 RX ORDER — LISINOPRIL 20 MG/1
20 TABLET ORAL DAILY
Qty: 90 TABLET | Refills: 1 | Status: SHIPPED | OUTPATIENT
Start: 2024-11-06

## 2024-11-16 DIAGNOSIS — I63.311 CEREBROVASCULAR ACCIDENT (CVA) DUE TO THROMBOSIS OF RIGHT MIDDLE CEREBRAL ARTERY (H): ICD-10-CM

## 2024-11-18 RX ORDER — CALCIUM CARBONATE/VITAMIN D3 600 MG-20
325 TABLET ORAL DAILY
Qty: 100 TABLET | Refills: 0 | Status: SHIPPED | OUTPATIENT
Start: 2024-11-18

## 2024-12-04 ENCOUNTER — OFFICE VISIT (OUTPATIENT)
Dept: FAMILY MEDICINE | Facility: CLINIC | Age: 69
End: 2024-12-04
Payer: COMMERCIAL

## 2024-12-04 ENCOUNTER — ANCILLARY PROCEDURE (OUTPATIENT)
Dept: CT IMAGING | Facility: CLINIC | Age: 69
End: 2024-12-04
Attending: PHYSICIAN ASSISTANT
Payer: COMMERCIAL

## 2024-12-04 ENCOUNTER — NURSE TRIAGE (OUTPATIENT)
Dept: FAMILY MEDICINE | Facility: CLINIC | Age: 69
End: 2024-12-04

## 2024-12-04 VITALS
SYSTOLIC BLOOD PRESSURE: 148 MMHG | BODY MASS INDEX: 34.96 KG/M2 | TEMPERATURE: 98 F | RESPIRATION RATE: 16 BRPM | HEIGHT: 62 IN | HEART RATE: 55 BPM | DIASTOLIC BLOOD PRESSURE: 72 MMHG | OXYGEN SATURATION: 98 % | WEIGHT: 190 LBS

## 2024-12-04 DIAGNOSIS — R51.9 ACUTE NONINTRACTABLE HEADACHE, UNSPECIFIED HEADACHE TYPE: ICD-10-CM

## 2024-12-04 DIAGNOSIS — M62.81 GENERALIZED MUSCLE WEAKNESS: Primary | ICD-10-CM

## 2024-12-04 DIAGNOSIS — I10 HYPERTENSION GOAL BP (BLOOD PRESSURE) < 140/90: ICD-10-CM

## 2024-12-04 LAB
BASOPHILS # BLD AUTO: 0.1 10E3/UL (ref 0–0.2)
BASOPHILS NFR BLD AUTO: 1 %
EOSINOPHIL # BLD AUTO: 0.2 10E3/UL (ref 0–0.7)
EOSINOPHIL NFR BLD AUTO: 3 %
ERYTHROCYTE [DISTWIDTH] IN BLOOD BY AUTOMATED COUNT: 13.8 % (ref 10–15)
HCT VFR BLD AUTO: 42.6 % (ref 35–47)
HGB BLD-MCNC: 14 G/DL (ref 11.7–15.7)
IMM GRANULOCYTES # BLD: 0 10E3/UL
IMM GRANULOCYTES NFR BLD: 0 %
LYMPHOCYTES # BLD AUTO: 2.5 10E3/UL (ref 0.8–5.3)
LYMPHOCYTES NFR BLD AUTO: 41 %
MCH RBC QN AUTO: 29.6 PG (ref 26.5–33)
MCHC RBC AUTO-ENTMCNC: 32.9 G/DL (ref 31.5–36.5)
MCV RBC AUTO: 90 FL (ref 78–100)
MONOCYTES # BLD AUTO: 0.5 10E3/UL (ref 0–1.3)
MONOCYTES NFR BLD AUTO: 9 %
NEUTROPHILS # BLD AUTO: 2.9 10E3/UL (ref 1.6–8.3)
NEUTROPHILS NFR BLD AUTO: 47 %
PLATELET # BLD AUTO: 193 10E3/UL (ref 150–450)
RBC # BLD AUTO: 4.73 10E6/UL (ref 3.8–5.2)
WBC # BLD AUTO: 6.1 10E3/UL (ref 4–11)

## 2024-12-04 PROCEDURE — 85025 COMPLETE CBC W/AUTO DIFF WBC: CPT | Performed by: PHYSICIAN ASSISTANT

## 2024-12-04 PROCEDURE — 99214 OFFICE O/P EST MOD 30 MIN: CPT | Performed by: PHYSICIAN ASSISTANT

## 2024-12-04 PROCEDURE — 84443 ASSAY THYROID STIM HORMONE: CPT | Performed by: PHYSICIAN ASSISTANT

## 2024-12-04 PROCEDURE — 70450 CT HEAD/BRAIN W/O DYE: CPT | Mod: TC | Performed by: RADIOLOGY

## 2024-12-04 PROCEDURE — 80048 BASIC METABOLIC PNL TOTAL CA: CPT | Performed by: PHYSICIAN ASSISTANT

## 2024-12-04 PROCEDURE — 36415 COLL VENOUS BLD VENIPUNCTURE: CPT | Performed by: PHYSICIAN ASSISTANT

## 2024-12-04 RX ORDER — AMLODIPINE BESYLATE 2.5 MG/1
2.5 TABLET ORAL DAILY
Qty: 90 TABLET | Refills: 0 | Status: SHIPPED | OUTPATIENT
Start: 2024-12-04

## 2024-12-04 NOTE — TELEPHONE ENCOUNTER
Addendum.    Patient is alert and oriented x3.   Patient stated she is at baseline for her.    Concepción ANTHONY RN  Triage Nurse  [facility]

## 2024-12-04 NOTE — TELEPHONE ENCOUNTER
"Reason for Disposition   Systolic BP >= 130 OR Diastolic >= 80, and history of heart problems, kidney disease, or diabetes mellitus     History of CVA    Additional Information   Negative: Systolic BP >= 160 OR Diastolic >= 100, and any cardiac (e.g., breathing difficulty, chest pain) or neurologic symptoms (e.g., new-onset blurred or double vision)   Negative: Systolic BP >= 200 OR Diastolic >= 120 and having NO cardiac or neurologic symptoms   Negative: Systolic BP >= 180 OR Diastolic >= 110, and missed most recent dose of blood pressure medication   Negative: Systolic BP >= 180 OR Diastolic >= 110   Negative: Ran out of BP medications   Negative: Taking BP medications and feels is having side effects (e.g., impotence, cough, dizziness)   Negative: Sounds like a life-threatening emergency to the triager   Negative: Symptom is main concern (e.g., headache, chest pain)   Negative: Patient sounds very sick or weak to the triager    Answer Assessment - Initial Assessment Questions  Patient walked into clinic and stated that her BPs have been running around 140s/80s. And she is feeling more weak.    1. BLOOD PRESSURE: \"What is your blood pressure?\" \"Did you take at least two measurements 5 minutes apart?\"      In clinic vitals: 136/78, resp 16, pulse 55, sats 98%  2. ONSET: \"When did you take your blood pressure?\"      In clinic  3. HOW: \"How did you take your blood pressure?\" (e.g., automatic home BP monitor, visiting nurse)      NA  4. HISTORY: \"Do you have a history of high blood pressure?\"      yes  5. MEDICINES: \"Are you taking any medicines for blood pressure?\" \"Have you missed any doses recently?\"     I am on meds for BP, and have not missed any doses  6. OTHER SYMPTOMS: \"Do you have any symptoms?\" (e.g., blurred vision, chest pain, difficulty breathing, headache, weakness)      Increased weakness all over body. She stated that it is not new since her CVS a couple of years    Patient does have history of CVA "      Appointment scheduled with Christopher Sarah PA-C, at 10:100, with arrival at 0940    Protocols used: Blood Pressure - High-A-OH

## 2024-12-04 NOTE — PROGRESS NOTES
"    Subjective   Melissa is a 69 year old, presenting for the following health issues:  Illness        12/4/2024     9:28 AM   Additional Questions   Roomed by MP         12/4/2024     9:28 AM   Patient Reported Additional Medications   Patient reports taking the following new medications None per patient     HPI     Walk in-weak and dizziness  No cough or cold symptoms.  No fevers.  No chest pain or sob.  Ha x 2 weeks  Some vertigo x 2 weeks.   No palpitations.   No n/v/d.   No hearing changes.   No blurred or double vision.           Review of Systems  Constitutional, HEENT, cardiovascular, pulmonary, GI, , musculoskeletal, neuro, skin, endocrine and psych systems are negative, except as otherwise noted.      Objective    BP (!) 148/72   Pulse 55   Temp 98  F (36.7  C)   Resp 16   Ht 1.575 m (5' 2\")   Wt 86.2 kg (190 lb)   LMP  (LMP Unknown)   SpO2 98%   BMI 34.75 kg/m    Body mass index is 34.75 kg/m .  Physical Exam   Eye exam - right eye normal lid, conjunctiva, cornea, pupil and fundus, left eye normal lid, conjunctiva, cornea, pupil and fundus.  ENT exam reveals - ENT exam normal, no neck nodes or sinus tenderness.  CHEST:chest clear to IPPA, no tachypnea, retractions or cyanosis, and S1, S2 normal, no murmur, no gallop, rate regular.  Let sided hemiparesis due to her previous stroke, her neurology exam was otherwise negative.    Melissa was seen today for illness.    Diagnoses and all orders for this visit:    Generalized muscle weakness  -     CBC with platelets and differential; Future  -     Basic metabolic panel  (Ca, Cl, CO2, Creat, Gluc, K, Na, BUN); Future  -     TSH; Future  -     TSH  -     Basic metabolic panel  (Ca, Cl, CO2, Creat, Gluc, K, Na, BUN)  -     CBC with platelets and differential    Acute nonintractable headache, unspecified headache type  -     CT Head w/o Contrast; Future    Hypertension goal BP (blood pressure) < 140/90  -     amLODIPine (NORVASC) 2.5 MG tablet; Take 1 tablet " (2.5 mg) by mouth daily.    Other orders  -     REVIEW OF HEALTH MAINTENANCE PROTOCOL ORDERS    Cbc was normal.  Ct results:1. No evidence of acute intracranial hemorrhage, mass, or herniation.  2. Marked periventricular white matter hypodensities which appear  increased since 10/23/2018. These may be due to chronic microvascular  ischemic disease, however infarcts are not excluded. Possibility of  acute infarct would be better assessed by brain MR.    Due to her ongoing symptoms and to rule out more acute ischemic changes, we have contacted patient and advised her to go to the ER for additional evaluation (to include a stat mri)    Signed Electronically by: Christopher Sarah PA-C

## 2024-12-05 LAB
ANION GAP SERPL CALCULATED.3IONS-SCNC: 6 MMOL/L (ref 7–15)
BUN SERPL-MCNC: 11 MG/DL (ref 8–23)
CALCIUM SERPL-MCNC: 9.4 MG/DL (ref 8.8–10.4)
CHLORIDE SERPL-SCNC: 106 MMOL/L (ref 98–107)
CREAT SERPL-MCNC: 0.63 MG/DL (ref 0.51–0.95)
EGFRCR SERPLBLD CKD-EPI 2021: >90 ML/MIN/1.73M2
GLUCOSE SERPL-MCNC: 95 MG/DL (ref 70–99)
HCO3 SERPL-SCNC: 28 MMOL/L (ref 22–29)
POTASSIUM SERPL-SCNC: 4.3 MMOL/L (ref 3.4–5.3)
SODIUM SERPL-SCNC: 140 MMOL/L (ref 135–145)
TSH SERPL DL<=0.005 MIU/L-ACNC: 1.05 UIU/ML (ref 0.3–4.2)

## 2024-12-10 ENCOUNTER — PATIENT OUTREACH (OUTPATIENT)
Dept: GERIATRIC MEDICINE | Facility: CLINIC | Age: 69
End: 2024-12-10
Payer: COMMERCIAL

## 2024-12-10 NOTE — PROGRESS NOTES
[facility] Care Coordination Contact    Member became effective with  [facility] on 12/1/2024 with [facility]  Previous Health Plan: [facility]+  Previous Care System: [facility]  Previous care coordinators name and number: Lesliemary Vines ( [email or web address])  [place]  W[place] Type: EW  [facility] received: No: Requested on 12/10/24  Mailed welcome letter and [facility] When to Contact Your Care Coordinator  Address/Phone discrepancy: N/A  [place]: [place] in [place] [employer] but not prepped for visit, please task CMS if needed.    Aracelis Lee  Case Management Specialist   [facility]  [phone]

## 2024-12-10 NOTE — LETTER
December 10, 2024    MELISSA ADAM  [address]   [place] [address]      Dear Melissa:    As a member of [facility] ([facility] you are provided a care coordinator. I will be your new care coordinator as of 12/1/2024. I will be calling you soon to see how you are doing and determine your needs.    If you have any questions, please feel free to call me at [phone]. If you reach my voice mail, please leave a message and your phone number. If you are hearing impaired, please call the [place] [employer] at [phone] or [phone] ([id number] relay service).    I look forward to speaking with you soon.    Sincerely,        Renan Castellanos, [facility]  [phone]  [email or web address]          [facility]  [id number] DHS Approved ([id number])  [id number] (11/18)

## 2024-12-12 ENCOUNTER — OFFICE VISIT (OUTPATIENT)
Dept: FAMILY MEDICINE | Facility: CLINIC | Age: 69
End: 2024-12-12
Attending: FAMILY MEDICINE
Payer: COMMERCIAL

## 2024-12-12 VITALS
BODY MASS INDEX: 36.13 KG/M2 | SYSTOLIC BLOOD PRESSURE: 140 MMHG | OXYGEN SATURATION: 96 % | WEIGHT: 191.38 LBS | HEIGHT: 61 IN | DIASTOLIC BLOOD PRESSURE: 58 MMHG | TEMPERATURE: 97.2 F | RESPIRATION RATE: 16 BRPM | HEART RATE: 54 BPM

## 2024-12-12 DIAGNOSIS — I10 HYPERTENSION GOAL BP (BLOOD PRESSURE) < 140/90: ICD-10-CM

## 2024-12-12 DIAGNOSIS — M17.0 PRIMARY OSTEOARTHRITIS OF BOTH KNEES: ICD-10-CM

## 2024-12-12 DIAGNOSIS — Z23 NEED FOR PROPHYLACTIC VACCINATION AND INOCULATION AGAINST INFLUENZA: ICD-10-CM

## 2024-12-12 DIAGNOSIS — Z86.73 HISTORY OF STROKE: ICD-10-CM

## 2024-12-12 DIAGNOSIS — Z00.00 ROUTINE GENERAL MEDICAL EXAMINATION AT A HEALTH CARE FACILITY: Primary | ICD-10-CM

## 2024-12-12 DIAGNOSIS — M62.81 GENERALIZED MUSCLE WEAKNESS: ICD-10-CM

## 2024-12-12 DIAGNOSIS — R51.9 NONINTRACTABLE HEADACHE, UNSPECIFIED CHRONICITY PATTERN, UNSPECIFIED HEADACHE TYPE: ICD-10-CM

## 2024-12-12 RX ORDER — ACETAMINOPHEN 500 MG
1000 TABLET ORAL EVERY 8 HOURS PRN
Qty: 270 TABLET | Refills: 1 | Status: SHIPPED | OUTPATIENT
Start: 2024-12-12

## 2024-12-12 RX ORDER — METHOCARBAMOL 500 MG/1
500 TABLET, FILM COATED ORAL 3 TIMES DAILY PRN
Qty: 90 TABLET | Refills: 1 | Status: SHIPPED | OUTPATIENT
Start: 2024-12-12

## 2024-12-12 SDOH — HEALTH STABILITY: PHYSICAL HEALTH: ON AVERAGE, HOW MANY DAYS PER WEEK DO YOU ENGAGE IN MODERATE TO STRENUOUS EXERCISE (LIKE A BRISK WALK)?: 1 DAY

## 2024-12-12 ASSESSMENT — SOCIAL DETERMINANTS OF HEALTH (SDOH): HOW OFTEN DO YOU GET TOGETHER WITH FRIENDS OR RELATIVES?: MORE THAN THREE TIMES A WEEK

## 2024-12-12 ASSESSMENT — PAIN SCALES - GENERAL: PAINLEVEL_OUTOF10: NO PAIN (0)

## 2024-12-12 NOTE — PATIENT INSTRUCTIONS
Patient Education   Preventive Care Advice   This is general advice given by our system to help you stay healthy. However, your care team may have specific advice just for you. Please talk to your care team about your preventive care needs.  Nutrition  Eat 5 or more servings of fruits and vegetables each day.  Try wheat bread, brown rice and whole grain pasta (instead of white bread, rice, and pasta).  Get enough calcium and vitamin D. Check the label on foods and aim for 100% of the RDA (recommended daily allowance).  Lifestyle  Exercise at least 150 minutes each week  (30 minutes a day, 5 days a week).  Do muscle strengthening activities 2 days a week. These help control your weight and prevent disease.  No smoking.  Wear sunscreen to prevent skin cancer.  Have a dental exam and cleaning every 6 months.  Yearly exams  See your health care team every year to talk about:  Any changes in your health.  Any medicines your care team has prescribed.  Preventive care, family planning, and ways to prevent chronic diseases.  Shots (vaccines)   HPV shots (up to age 26), if you've never had them before.  Hepatitis B shots (up to age 59), if you've never had them before.  COVID-19 shot: Get this shot when it's due.  Flu shot: Get a flu shot every year.  Tetanus shot: Get a tetanus shot every 10 years.  Pneumococcal, hepatitis A, and RSV shots: Ask your care team if you need these based on your risk.  Shingles shot (for age 50 and up)  General health tests  Diabetes screening:  Starting at age 35, Get screened for diabetes at least every 3 years.  If you are younger than age 35, ask your care team if you should be screened for diabetes.  Cholesterol test: At age 39, start having a cholesterol test every 5 years, or more often if advised.  Bone density scan (DEXA): At age 50, ask your care team if you should have this scan for osteoporosis (brittle bones).  Hepatitis C: Get tested at least once in your life.  STIs (sexually  transmitted infections)  Before age 24: Ask your care team if you should be screened for STIs.  After age 24: Get screened for STIs if you're at risk. You are at risk for STIs (including HIV) if:  You are sexually active with more than one person.  You don't use condoms every time.  You or a partner was diagnosed with a sexually transmitted infection.  If you are at risk for HIV, ask about PrEP medicine to prevent HIV.  Get tested for HIV at least once in your life, whether you are at risk for HIV or not.  Cancer screening tests  Cervical cancer screening: If you have a cervix, begin getting regular cervical cancer screening tests starting at age 21.  Breast cancer scan (mammogram): If you've ever had breasts, begin having regular mammograms starting at age 40. This is a scan to check for breast cancer.  Colon cancer screening: It is important to start screening for colon cancer at age 45.  Have a colonoscopy test every 10 years (or more often if you're at risk) Or, ask your provider about stool tests like a FIT test every year or Cologuard test every 3 years.  To learn more about your testing options, visit:   .  For help making a decision, visit:   https://bit.ly/mu18617.  Prostate cancer screening test: If you have a prostate, ask your care team if a prostate cancer screening test (PSA) at age 55 is right for you.  Lung cancer screening: If you are a current or former smoker ages 50 to 80, ask your care team if ongoing lung cancer screenings are right for you.  For informational purposes only. Not to replace the advice of your health care provider. Copyright   2023 [employer]. All rights reserved. Clinically reviewed by the [facility] Transitions Program. [facility] ([facility]) 513807 - REV 01/24.

## 2024-12-12 NOTE — PROGRESS NOTES
"Preventive Care Visit  [facility]  Esperanza Buck MD, Family Medicine  Dec 12, 2024      Assessment & Plan     Routine general medical examination at a health care facility  Pt here for routine physical and follow up from hospitalization from headache  Preventive care reviewed and updated.    Hypertension goal BP (blood pressure) < 140/90  Continue medications as prescribed    Nonintractable headache, unspecified chronicity pattern, unspecified headache type  Pt unable to determine onset, described as achy in occiput/posterior  - acetaminophen (TYLENOL) 500 MG tablet; Take 2 tablets (1,000 mg) by mouth every 8 hours as needed for mild pain.    History of stroke  Pt requesting refill for general pain and muscle tension  - methocarbamol (ROBAXIN) 500 MG tablet; Take 1 tablet (500 mg) by mouth 3 times daily as needed for muscle spasms.    Generalized muscle weakness  Pt reporting generalized weakness and low back pain   - Physical Therapy  Referral; Future    Primary osteoarthritis of both knees  Bilateral steroid injections 7/5/2024 knee pain has returned   - Orthopedic  Referral; Future        BMI  Estimated body mass index is 36.16 kg/m  as calculated from the following:    Height as of this encounter: 1.549 m (5' 1\").    Weight as of this encounter: 86.8 kg (191 lb 6 oz).   Weight management plan: Discussed healthy diet and exercise guidelines    Counseling  Appropriate preventive services were addressed with this patient via screening, questionnaire, or discussion as appropriate for fall prevention, nutrition, physical activity, Tobacco-use cessation, social engagement, weight loss and cognition.  Checklist reviewing preventive services available has been given to the patient.  Reviewed patient's diet, addressing concerns and/or questions.   She is at risk for lack of exercise and has been provided with information to increase physical activity for the benefit of her " well-being.   The patient was instructed to see the dentist every 6 months.     Pt given referrals for physical therapy and Ortho and instructed to schedule with them. Return for follow up in 1 year for annual physical or sooner if new or worsening problems arise.       Madison Torres is a 69 year old, presenting for the following:  Physical, Flu Shot, and Imm/Inj (Flu Shot)        12/12/2024     7:46 AM   Additional Questions   Roomed by Anjelica   Accompanied by Daughter          HPI    Pt presents for follow up from ED visit for headache and weakness. Head CT and MRI done:   MR HEAD BRAIN WO   Impression    1.  No significant change since 12/5/2022. No acute intracranial pathology.  2.  Stable chronic region of infarction in the posterior right frontal lobe and dorsal aspect of the right basal ganglia with associated right-sided Wallerian degeneration.  3.  Stable moderate chronic small vessel skin disease and mild to moderate generalized brain parenchymal volume loss.    No new neurologic changes were noted, weakness noted to be at baseline. Pt discharged home and advised to follow up with PCP. Imaging and labs reviewed today, no additional labs or tests ordered. Pt reports headache still present, denies aura, N/V, LOC, or any other new symptoms. Has not tried any OTC medications or anything at home to relieve headache. Unsure of alleviating or aggravating factors, discussed pharmacologic and nonpharmacologic options, and warning signs or red flags that would prompt ED visit.   Pt reporting bilateral knee pain returned after steroid injection in July, would like another referral for Ortho. Requested refill on methocarbamol for muscle tension and pain. No recent illness, SOB, or chest pain.    Preventive - advised to get Shingrix from our pharmacy. Declined    Immunization History   Administered Date(s) Administered    COVID-19 Monovalent 18+ (Moderna) 07/08/2021, 08/06/2021, 04/28/2022    Influenza (High Dose)  Trivalent,PF (Fluzone) 12/12/2024    Influenza Vaccine 65+ (Fluzone HD) 09/29/2020, 10/04/2022    Influenza Vaccine >6 months,quad, PF 01/18/2018, 01/08/2019    Mantoux Tuberculin Skin Test 03/20/2018    Pneumococcal 20 valent Conjugate (Prevnar 20) 05/01/2022    TDAP Vaccine (Adacel) 03/22/2017       -HIV/Hep C screen: declines    -Mammogram: scheduled for 12/24/2024       Lab Results   Component Value Date    PAP NIL 03/22/2017       - Colon CA screen: Colonoscopy, age 45-75 every 10 years or FIT every year or Cologuard every 3 years due 11/05/2027      - AAA screen: Medicare pays for one time u/s for males at age 65 + family history or 100 cigarettes in lifetime. < 3 cm - no further testing, 3.0 to 3.9 cm - every 2-3 years, 1.0 to 5.4 cm - every 6-12 months, > 5.4 refer to surgery. But actually better to refer at 5.0 cm or if expanding. U/S or CT is fine. They don't say CT is preferred. Guidelines from Society for Vascular Surgery (SVS).    - Advanced Directive: referred today declined previously.    -lipids screen: completed 11/05/2024    Diabetes screen: completed 11/05/2024          Health Care Directive  Patient does not have a Health Care Directive: Discussed advance care planning with patient; however, patient declined at this time.      12/12/2024   General Health   How would you rate your overall physical health? Good   Feel stress (tense, anxious, or unable to sleep) Not at all            12/12/2024   Nutrition   Diet: I don't know            12/12/2024   Exercise   Days per week of moderate/strenous exercise 1 day      (!) EXERCISE CONCERN      12/12/2024   Social Factors   Frequency of gathering with friends or relatives More than three times a week   Worry food won't last until get money to buy more No   Food not last or not have enough money for food? No   Do you have housing? (Housing is defined as stable permanent housing and does not include staying ouside in a car, in a tent, in an abandoned  building, in an overnight shelter, or couch-surfing.) Yes   Are you worried about losing your housing? No   Lack of transportation? No   Unable to get utilities (heat,electricity)? No            12/12/2024   Fall Risk   Fallen 2 or more times in the past year? No    Trouble with walking or balance? Yes    Gait Speed Test (Document in seconds) 7   Gait Speed Test Interpretation Greater than 5.01 seconds - ABNORMAL       Patient-reported          12/12/2024   Activities of Daily Living- Home Safety   Needs help with the following daily activites None of the above   Safety concerns in the home None of the above            12/12/2024   Dental   Dentist two times every year? (!) NO            12/12/2024   Hearing Screening   Hearing concerns? None of the above            12/12/2024   Driving Risk Screening   Patient/family members have concerns about driving (!) DECLINE            12/12/2024   General Alertness/Fatigue Screening   Have you been more tired than usual lately? No            12/12/2024   Urinary Incontinence Screening   Bothered by leaking urine in past 6 months No               Today's PHQ-2 Score:       12/12/2024     7:38 AM   PHQ-2 ( 1999 Pfizer)   PHQ-2 Score Incomplete           12/12/2024   Substance Use   Alcohol more than 3/day or more than 7/wk No   Do you have a current opioid prescription? No   How severe/bad is pain from 1 to 10? 5/10   Do you use any other substances recreationally? No        Social History     Tobacco Use    Smoking status: Never     Passive exposure: Never    Smokeless tobacco: Never   Vaping Use    Vaping status: Never Used   Substance Use Topics    Alcohol use: Yes     Comment: small amt    Drug use: No           1/4/2022   LAST FHS-7 RESULTS   1st degree relative breast or ovarian cancer No   Any relative bilateral breast cancer No   Any male have breast cancer No   Any ONE woman have BOTH breast AND ovarian cancer No   Any woman with breast cancer before 50yrs No   2 or  more relatives with breast AND/OR ovarian cancer No   2 or more relatives with breast AND/OR bowel cancer No           Mammogram Screening - Mammogram every 1-2 years updated in Health Maintenance based on mutual decision making      History of abnormal Pap smear: No - age 65 or older with adequate negative prior screening test results (3 consecutive negative cytology results, 2 consecutive negative cotesting results, or 2 consecutive negative HrHPV test results within 10 years, with the most recent test occurring within the recommended screening interval for the test used)        Latest Ref Rng & Units 3/22/2017    10:10 AM 3/22/2017    10:00 AM   PAP / HPV   PAP (Historical)  NIL     HPV 16 DNA NEG  Negative    HPV 18 DNA NEG  Negative    Other HR HPV NEG  Negative      ASCVD Risk   The ASCVD Risk score (Sandy DK, et al., 2019) failed to calculate for the following reasons:    Risk score cannot be calculated because patient has a medical history suggesting prior/existing ASCVD            Reviewed and updated as needed this visit by Provider     Meds   Med Hx  Surg Hx             PMH of Prediabetes, HTN, CVA 4/2022, bilateral knee pain, latent TB, Obesity, glaucoma.   Past Surgical History:   Procedure Laterality Date    CATARACT EXTRACTION, BILATERAL  2023     Current providers sharing in care for this patient include:  Patient Care Team:  Esperanza Buck MD as PCP - General (Family Medicine)  Esperanza Buck MD as Assigned PCP  Casey Bailey DO as Assigned Neuroscience Provider  Renan Castellanos LSW as Lead Care Coordinator (Primary Care - CC)    The following health maintenance items are reviewed in Epic and correct as of today:  Health Maintenance   Topic Date Due    DEXA  Never done    ZOSTER IMMUNIZATION (1 of 2) Never done    MAMMO SCREENING  01/04/2024    COVID-19 Vaccine (4 - 2024-25 season) 09/01/2024    LIPID  11/05/2025    COLORECTAL CANCER SCREENING  11/05/2025    BMP  12/04/2025     "ANNUAL REVIEW OF HM ORDERS  12/04/2025    MEDICARE ANNUAL WELLNESS VISIT  12/12/2025    FALL RISK ASSESSMENT  12/12/2025    DTAP/TDAP/TD IMMUNIZATION (2 - Td or Tdap) 03/22/2027    GLUCOSE  12/04/2027    RSV VACCINE (1 - 1-dose 75+ series) 02/11/2029    ADVANCE CARE PLANNING  12/12/2029    HEPATITIS C SCREENING  Completed    PHQ-2 (once per calendar year)  Completed    INFLUENZA VACCINE  Completed    Pneumococcal Vaccine: 65+ Years  Completed    HPV IMMUNIZATION  Aged Out    MENINGITIS IMMUNIZATION  Aged Out    RSV MONOCLONAL ANTIBODY  Aged Out         Review of Systems  Constitutional, HEENT, cardiovascular, pulmonary, gi and gu systems are negative, except as otherwise noted.     Objective    Exam  BP (!) 140/58   Pulse 54   Temp 97.2  F (36.2  C) (Tympanic)   Resp 16   Ht 1.549 m (5' 1\")   Wt 86.8 kg (191 lb 6 oz)   LMP  (LMP Unknown)   SpO2 96%   BMI 36.16 kg/m     Estimated body mass index is 36.16 kg/m  as calculated from the following:    Height as of this encounter: 1.549 m (5' 1\").    Weight as of this encounter: 86.8 kg (191 lb 6 oz).    Physical Exam  GENERAL: alert and no distress  EYES: Eyes grossly normal to inspection, sclera clear/white,  HENT: ear canals and TM's normal, mild cerumen accumulation, nose and mouth without ulcers or lesions  NECK: no adenopathy, no asymmetry, masses, or scars  RESP: lungs clear to auscultation - no rales, rhonchi or wheezes  CV: regular rate and rhythm, normal S1 S2, no S3 or S4, no murmur, click or rub, no peripheral edema  ABDOMEN: soft, nontender, no hepatosplenomegaly, no masses and bowel sounds normal  MS: normal muscle tone, normal range of motion, LUE exam shows weakness, and LLE exam shows weakness  SKIN: no suspicious lesions or rashes and skin dry/ashy   NEURO: sensory deficit numbness/tingling bilateral hands and feet, mentation intact, and Left side weaker than Right, walker required for gait stability  PSYCH: mentation appears normal, affect " normal/bright        12/12/2024   Mini Cog   Mini-Cog Not Completed (choose reason) Patient declines          Patient declines, there are NO concerns for cognitive deficits.    Martha Diana, RN, BSN, DNP Student       The student acted as a scribe and the encounter documented above was completely performed by myself and the documentation reflects the work I have performed today.    Signed Electronically by: Esperanza Buck MD

## 2024-12-20 ENCOUNTER — PATIENT OUTREACH (OUTPATIENT)
Dept: GERIATRIC MEDICINE | Facility: CLINIC | Age: 69
End: 2024-12-20
Payer: COMMERCIAL

## 2024-12-20 NOTE — PROGRESS NOTES
[facility] Care Coordination Contact    [facility] Change (Transfer)    Member is new enrollee to [facility] effective 12/01/24 with [facility]. Member transferred from [facility].    No home visit required because this care coordinator (CC) has received all required documentation from the previous CC.  Member currently approved for PERS, attendance of adult day care 2 days per week, transportation, homemaking and CFSS services.  Melissa recently approved for 5 units daily of CFSS services but awaiting on completion of CFSS consultation before CFSS services can be authorized.    Called member and introduced self as member's new CC. Confirmed with member that the welcome letter with writer's name and contact information has been received.  [place]ed [place]Health Risk Assessment (HRA) and Support Plan with member. No changes noted.  Transitional HRA completed. Support Plan updated and reflects current services.  Required referral authorization information communicated to CMS: Yes    Writer reviewed the following with member:    ER visits: No  Hospitalizations: No  TCU stays: No  Significant health status changes: n/a  Falls/Injuries: No  ADL/IADL changes: No  Changes in services: No    Follow-Up Plan: Member informed of future contact, plan to f/u with member with at next regularly scheduled contact.  Contact information shared with member and family, encouraged member to call with any questions or concerns.    Renan Castellanos, [facility]  [phone]

## 2025-01-02 ENCOUNTER — PATIENT OUTREACH (OUTPATIENT)
Dept: GERIATRIC MEDICINE | Facility: CLINIC | Age: 70
End: 2025-01-02
Payer: COMMERCIAL

## 2025-01-02 NOTE — PROGRESS NOTES
[facility] Care Coordination Contact    Sent copy Service delivery Plan to member and CS services. Unable to submit Referral and send to CFSS agency- agency not yet enrolled.     Terra Eddy  Care Management Specialist  [facility]  [phone]

## 2025-01-08 ENCOUNTER — MEDICAL CORRESPONDENCE (OUTPATIENT)
Dept: HEALTH INFORMATION MANAGEMENT | Facility: CLINIC | Age: 70
End: 2025-01-08
Payer: COMMERCIAL

## 2025-01-15 ENCOUNTER — TRANSFERRED RECORDS (OUTPATIENT)
Dept: HEALTH INFORMATION MANAGEMENT | Facility: CLINIC | Age: 70
End: 2025-01-15
Payer: COMMERCIAL

## 2025-01-21 ENCOUNTER — PATIENT OUTREACH (OUTPATIENT)
Dept: GERIATRIC MEDICINE | Facility: CLINIC | Age: 70
End: 2025-01-21
Payer: COMMERCIAL

## 2025-01-21 NOTE — PROGRESS NOTES
[facility] Care Coordination Contact    Received notice that member is not using her PERS unit. [facility] mailing a box to member to return unit. Member was set up with PERS by previous care coordinator but never activated unit.  Member reported to [facility] that she does not want service.     Support plan updated and DTR request sent to utilization team for processing.    Renan Castellanos, [facility]   [facility]  [phone]

## 2025-01-21 NOTE — PROGRESS NOTES
[facility] Care Coordination Contact    Received a request to submit a DTR for the terminated of [employer]. Documentation completed and faxed to the health plan. Care Coordinator aware.    Oma Jiang RN  Utilization   [facility]  [phone]

## 2025-01-23 ENCOUNTER — ANCILLARY PROCEDURE (OUTPATIENT)
Dept: MAMMOGRAPHY | Facility: CLINIC | Age: 70
End: 2025-01-23
Attending: FAMILY MEDICINE
Payer: COMMERCIAL

## 2025-01-23 DIAGNOSIS — Z12.31 ENCOUNTER FOR SCREENING MAMMOGRAM FOR BREAST CANCER: ICD-10-CM

## 2025-02-17 DIAGNOSIS — I10 HYPERTENSION GOAL BP (BLOOD PRESSURE) < 140/90: ICD-10-CM

## 2025-02-17 DIAGNOSIS — Z86.73 HISTORY OF STROKE: ICD-10-CM

## 2025-02-17 RX ORDER — METHOCARBAMOL 500 MG/1
500 TABLET, FILM COATED ORAL 3 TIMES DAILY PRN
Qty: 90 TABLET | Refills: 1 | Status: SHIPPED | OUTPATIENT
Start: 2025-02-17

## 2025-02-17 RX ORDER — AMLODIPINE BESYLATE 2.5 MG/1
2.5 TABLET ORAL DAILY
Qty: 90 TABLET | Refills: 0 | Status: SHIPPED | OUTPATIENT
Start: 2025-02-17

## 2025-02-26 ENCOUNTER — TRANSFERRED RECORDS (OUTPATIENT)
Dept: HEALTH INFORMATION MANAGEMENT | Facility: CLINIC | Age: 70
End: 2025-02-26
Payer: COMMERCIAL

## 2025-03-18 ENCOUNTER — TRANSFERRED RECORDS (OUTPATIENT)
Dept: HEALTH INFORMATION MANAGEMENT | Facility: CLINIC | Age: 70
End: 2025-03-18
Payer: COMMERCIAL

## 2025-04-07 ENCOUNTER — PATIENT OUTREACH (OUTPATIENT)
Dept: GERIATRIC MEDICINE | Facility: CLINIC | Age: 70
End: 2025-04-07
Payer: COMMERCIAL

## 2025-04-07 NOTE — PROGRESS NOTES
[facility] Care Coordination Contact     [facility]: Member new enrollee with CFSS.  Emailed CFSS assessment to [facility] for authorization.  Sent copy of [facility] to member and agency., Submitted referrals/auths for CFSS, and Updated services in Database.     Terra Eddy  Care Management Specialist  [facility]  [phone]

## 2025-05-14 ENCOUNTER — OFFICE VISIT (OUTPATIENT)
Dept: FAMILY MEDICINE | Facility: CLINIC | Age: 70
End: 2025-05-14
Payer: COMMERCIAL

## 2025-05-14 ENCOUNTER — PATIENT OUTREACH (OUTPATIENT)
Dept: GERIATRIC MEDICINE | Facility: CLINIC | Age: 70
End: 2025-05-14

## 2025-05-14 VITALS
HEIGHT: 61 IN | WEIGHT: 183.5 LBS | RESPIRATION RATE: 21 BRPM | SYSTOLIC BLOOD PRESSURE: 134 MMHG | BODY MASS INDEX: 34.64 KG/M2 | DIASTOLIC BLOOD PRESSURE: 78 MMHG | TEMPERATURE: 98.6 F | HEART RATE: 62 BPM | OXYGEN SATURATION: 98 %

## 2025-05-14 DIAGNOSIS — E66.01 CLASS 2 SEVERE OBESITY WITH BODY MASS INDEX (BMI) OF 35 TO 39.9 WITH SERIOUS COMORBIDITY (H): ICD-10-CM

## 2025-05-14 DIAGNOSIS — Z86.73 HISTORY OF STROKE: ICD-10-CM

## 2025-05-14 DIAGNOSIS — E66.812 CLASS 2 SEVERE OBESITY WITH BODY MASS INDEX (BMI) OF 35 TO 39.9 WITH SERIOUS COMORBIDITY (H): ICD-10-CM

## 2025-05-14 DIAGNOSIS — M17.0 PRIMARY OSTEOARTHRITIS OF BOTH KNEES: Primary | ICD-10-CM

## 2025-05-14 DIAGNOSIS — I10 HYPERTENSION GOAL BP (BLOOD PRESSURE) < 140/90: ICD-10-CM

## 2025-05-14 PROCEDURE — 3078F DIAST BP <80 MM HG: CPT | Performed by: FAMILY MEDICINE

## 2025-05-14 PROCEDURE — 1125F AMNT PAIN NOTED PAIN PRSNT: CPT | Performed by: FAMILY MEDICINE

## 2025-05-14 PROCEDURE — 3075F SYST BP GE 130 - 139MM HG: CPT | Performed by: FAMILY MEDICINE

## 2025-05-14 PROCEDURE — 99214 OFFICE O/P EST MOD 30 MIN: CPT | Performed by: FAMILY MEDICINE

## 2025-05-14 RX ORDER — LISINOPRIL 20 MG/1
20 TABLET ORAL DAILY
Qty: 90 TABLET | Refills: 3 | Status: SHIPPED | OUTPATIENT
Start: 2025-05-14

## 2025-05-14 RX ORDER — AMLODIPINE BESYLATE 2.5 MG/1
2.5 TABLET ORAL DAILY
Qty: 90 TABLET | Refills: 3 | Status: SHIPPED | OUTPATIENT
Start: 2025-05-14

## 2025-05-14 RX ORDER — ATORVASTATIN CALCIUM 40 MG/1
40 TABLET, FILM COATED ORAL DAILY
Qty: 90 TABLET | Refills: 3 | Status: SHIPPED | OUTPATIENT
Start: 2025-05-14

## 2025-05-14 ASSESSMENT — PAIN SCALES - GENERAL: PAINLEVEL_OUTOF10: SEVERE PAIN (10)

## 2025-05-14 NOTE — PROGRESS NOTES
[facility] Care Coordination Contact      [facility] Six-Month Telephone Assessment    6 month telephone assessment completed on 05/14/25.    ER visits: No  Hospitalizations: No  TCU stays: No  Significant health status changes: n/a  Falls/Injuries: No  ADL/IADL changes: No  Changes in services: No    Daughter asked if [facility] is approved to provide CFSS services. CC noted that they were approved and authorization request was sent to [facility] a couple weeks ago. CC noted we are waiting to hear back from [facility] noting official approval for 5 units daily.     Caregiver Assessment follow up:  n/a    Goals: See Support Plan for goal progress documentation.  [place].     Will see member in 6 months for an annual health risk assessment.   Encouraged member to call CC with any questions or concerns in the meantime.       Renan Castellanos, [facility]  [phone]

## 2025-05-14 NOTE — PROGRESS NOTES
Assessment & Plan     Primary osteoarthritis of both knees  Bilateral knee pain.  Previously tried physical therapy and injection.  She would like referral to repeat knee injection.  - Orthopedic  Referral; Future    Hypertension goal BP (blood pressure) < 140/90  Longstanding history of hypertension.  Currently on lisinopril, and Norvasc.  Blood pressure is well-controlled, denies any side effects from medication.  Continue the same treatment.  - lisinopril (ZESTRIL) 20 MG tablet; Take 1 tablet (20 mg) by mouth daily.  - amLODIPine (NORVASC) 2.5 MG tablet; Take 1 tablet (2.5 mg) by mouth daily.    History of stroke  Patient has a history of stroke with residual left-sided weakness.  Currently taking Lipitor 40 mg, tolerating well without side effects.  Continue the same treatment.  - atorvastatin (LIPITOR) 40 MG tablet; Take 1 tablet (40 mg) by mouth daily.    Class 2 severe obesity with body mass index (BMI) of 35 to 39.9 with serious comorbidity (H)  Body mass index is 34.67 kg/m .  Wt Readings from Last 4 Encounters:   05/14/25 83.2 kg (183 lb 8 oz)   12/12/24 86.8 kg (191 lb 6 oz)   12/04/24 86.2 kg (190 lb)   11/05/24 86.3 kg (190 lb 4.8 oz)     Contributes to hypertension, and hyperlipidemia  Recommended diet and exercise.          Follow-up       Madison Torres is a 70 year old, presenting for the following health issues:  Leg Pain and Back Pain        5/14/2025     8:07 AM   Additional Questions   Roomed by John   Accompanied by Self         5/14/2025     8:07 AM   Patient Reported Additional Medications   Patient reports taking the following new medications None     History of Present Illness       Reason for visit:  See jason   She is taking medications regularly.          Bilateral knee pain , back pain     Long time no injiry or trauma     Had injection in 03/2025 - did not help     Had injection iunb back 03/2025     Wt Readings from Last 4 Encounters:   05/14/25 83.2 kg (183 lb 8 oz)  "  12/12/24 86.8 kg (191 lb 6 oz)   12/04/24 86.2 kg (190 lb)   11/05/24 86.3 kg (190 lb 4.8 oz)             Review of Systems  Constitutional, HEENT, cardiovascular, pulmonary, gi and gu systems are negative, except as otherwise noted.      Objective    /78   Pulse 62   Temp 98.6  F (37  C) (Tympanic)   Resp 21   Ht 1.549 m (5' 1\")   Wt 83.2 kg (183 lb 8 oz)   LMP  (LMP Unknown)   SpO2 98%   BMI 34.67 kg/m    Body mass index is 34.67 kg/m .  Physical Exam  Vitals and nursing note reviewed.   Constitutional:       General: She is not in acute distress.     Appearance: Normal appearance. She is not ill-appearing, toxic-appearing or diaphoretic.   Neurological:      Mental Status: She is alert.      Cranial Nerves: No cranial nerve deficit or dysarthria.      Motor: Weakness and atrophy present. No tremor.      Gait: Gait abnormal.                    Signed Electronically by: Esperanza Buck MD    "

## 2025-05-15 ENCOUNTER — PATIENT OUTREACH (OUTPATIENT)
Dept: CARE COORDINATION | Facility: CLINIC | Age: 70
End: 2025-05-15
Payer: COMMERCIAL

## 2025-06-16 ENCOUNTER — PATIENT OUTREACH (OUTPATIENT)
Dept: CARE COORDINATION | Facility: CLINIC | Age: 70
End: 2025-06-16
Payer: COMMERCIAL

## 2025-07-03 ENCOUNTER — OFFICE VISIT (OUTPATIENT)
Dept: FAMILY MEDICINE | Facility: CLINIC | Age: 70
End: 2025-07-03
Payer: COMMERCIAL

## 2025-07-03 VITALS
HEART RATE: 80 BPM | WEIGHT: 185.6 LBS | BODY MASS INDEX: 35.04 KG/M2 | SYSTOLIC BLOOD PRESSURE: 110 MMHG | HEIGHT: 61 IN | DIASTOLIC BLOOD PRESSURE: 54 MMHG | OXYGEN SATURATION: 96 %

## 2025-07-03 DIAGNOSIS — G89.29 CHRONIC MIDLINE LOW BACK PAIN WITHOUT SCIATICA: Primary | ICD-10-CM

## 2025-07-03 DIAGNOSIS — M54.50 CHRONIC MIDLINE LOW BACK PAIN WITHOUT SCIATICA: Primary | ICD-10-CM

## 2025-07-03 RX ORDER — CYCLOBENZAPRINE HCL 10 MG
10 TABLET ORAL 3 TIMES DAILY PRN
Qty: 30 TABLET | Refills: 0 | Status: SHIPPED | OUTPATIENT
Start: 2025-07-03

## 2025-07-03 RX ORDER — GABAPENTIN 100 MG/1
100 CAPSULE ORAL AT BEDTIME
Qty: 90 CAPSULE | Refills: 0 | Status: SHIPPED | OUTPATIENT
Start: 2025-07-03

## 2025-07-03 ASSESSMENT — PAIN SCALES - GENERAL: PAINLEVEL_OUTOF10: NO PAIN (0)

## 2025-07-03 NOTE — PROGRESS NOTES
"  Assessment & Plan     Chronic midline low back pain without sciatica  Melissa Coronado is a 70 year old female who presents today for back pain.  She has a history of chronic back pain and was seen by [facility] on 02/26/2025 and had bilateral L4-L5 transforaminal epidural steroid injection TFE.  Patient reports injection helped initially, however the pain is back.  She does not have any red flag symptoms including bowel or bladder incontinence, saddle anesthesia, or lower extremity weakness     Recommended referral to physical therapy and spine specialist for definitive management trial of gabapentin and Flexeril.  - Physical Therapy  Referral; Future  - cyclobenzaprine (FLEXERIL) 10 MG tablet; Take 1 tablet (10 mg) by mouth 3 times daily as needed for muscle spasms.  - Spine  Referral; Future  - gabapentin (NEURONTIN) 100 MG capsule; Take 1 capsule (100 mg) by mouth at bedtime.        BMI  Estimated body mass index is 35.07 kg/m  as calculated from the following:    Height as of this encounter: 1.549 m (5' 1\").    Weight as of this encounter: 84.2 kg (185 lb 9.6 oz).             Madison Torres is a 70 year old, presenting for the following health issues:  Follow Up      7/3/2025     2:10 PM   Additional Questions   Roomed by April   Accompanied by self     History of Present Illness       Back Pain:  She presents for follow up of back pain. Patient's back pain is a recurring problem.  Location of back pain:  Right lower back, left lower back and right middle of back  Description of back pain: sharp  Back pain spreads: right knee and left knee    Since patient first noticed back pain, pain is: unchanged  Does back pain interfere with her job:  No       Headaches:   Since the patient's last clinic visit, headaches are: worsened  The patient is getting headaches:  Very often  She is not able to do normal daily activities when she has a migraine.  The patient is taking the following " "rescue/relief medications:  Tylenol   Patient states \"I get no relief\" from the rescue/relief medications.   The patient is taking the following medications to prevent migraines:  No medications to prevent migraines  In the past 4 weeks, the patient has gone to an Urgent Care or Emergency Room 0 times times due to headaches.    Reason for visit:  Knee and back pain    She eats 0-1 servings of fruits and vegetables daily.She consumes 0 sweetened beverage(s) daily.She exercises with enough effort to increase her heart rate 9 or less minutes per day.  She exercises with enough effort to increase her heart rate 7 days per week.   She is taking medications regularly.          Review of Systems  Constitutional, HEENT, cardiovascular, pulmonary, gi and gu systems are negative, except as otherwise noted.      Objective    /54   Pulse 80   Ht 1.549 m (5' 1\")   Wt 84.2 kg (185 lb 9.6 oz)   LMP  (LMP Unknown)   SpO2 96%   BMI 35.07 kg/m    Body mass index is 35.07 kg/m .  Physical Exam  Vitals and nursing note reviewed.   Constitutional:       General: She is not in acute distress.     Appearance: Normal appearance. She is not ill-appearing, toxic-appearing or diaphoretic.   Musculoskeletal:        Back:    Neurological:      Mental Status: She is alert.                    Signed Electronically by: Esperanza Buck MD    "

## 2025-07-05 ENCOUNTER — PATIENT OUTREACH (OUTPATIENT)
Dept: CARE COORDINATION | Facility: CLINIC | Age: 70
End: 2025-07-05
Payer: COMMERCIAL

## 2025-07-07 ENCOUNTER — PATIENT OUTREACH (OUTPATIENT)
Dept: CARE COORDINATION | Facility: CLINIC | Age: 70
End: 2025-07-07
Payer: COMMERCIAL

## 2025-08-05 ENCOUNTER — OFFICE VISIT (OUTPATIENT)
Dept: PALLIATIVE MEDICINE | Facility: OTHER | Age: 70
End: 2025-08-05
Attending: FAMILY MEDICINE
Payer: COMMERCIAL

## 2025-08-05 VITALS — OXYGEN SATURATION: 96 % | DIASTOLIC BLOOD PRESSURE: 65 MMHG | HEART RATE: 66 BPM | SYSTOLIC BLOOD PRESSURE: 140 MMHG

## 2025-08-05 DIAGNOSIS — M47.817 LUMBOSACRAL SPONDYLOSIS WITHOUT MYELOPATHY: ICD-10-CM

## 2025-08-05 DIAGNOSIS — G89.29 CHRONIC MIDLINE LOW BACK PAIN WITHOUT SCIATICA: ICD-10-CM

## 2025-08-05 DIAGNOSIS — M54.50 CHRONIC MIDLINE LOW BACK PAIN WITHOUT SCIATICA: ICD-10-CM

## 2025-08-05 DIAGNOSIS — M54.16 LUMBAR RADICULOPATHY: Primary | ICD-10-CM

## 2025-08-05 PROCEDURE — 1126F AMNT PAIN NOTED NONE PRSNT: CPT | Performed by: STUDENT IN AN ORGANIZED HEALTH CARE EDUCATION/TRAINING PROGRAM

## 2025-08-05 PROCEDURE — G0463 HOSPITAL OUTPT CLINIC VISIT: HCPCS | Performed by: STUDENT IN AN ORGANIZED HEALTH CARE EDUCATION/TRAINING PROGRAM

## 2025-08-05 PROCEDURE — 3077F SYST BP >= 140 MM HG: CPT | Performed by: STUDENT IN AN ORGANIZED HEALTH CARE EDUCATION/TRAINING PROGRAM

## 2025-08-05 PROCEDURE — 99204 OFFICE O/P NEW MOD 45 MIN: CPT | Performed by: STUDENT IN AN ORGANIZED HEALTH CARE EDUCATION/TRAINING PROGRAM

## 2025-08-05 PROCEDURE — 3078F DIAST BP <80 MM HG: CPT | Performed by: STUDENT IN AN ORGANIZED HEALTH CARE EDUCATION/TRAINING PROGRAM

## 2025-08-05 RX ORDER — GABAPENTIN 300 MG/1
300 CAPSULE ORAL 3 TIMES DAILY
Qty: 270 CAPSULE | Refills: 0 | Status: SHIPPED | OUTPATIENT
Start: 2025-08-05

## 2025-08-05 ASSESSMENT — PAIN SCALES - GENERAL: PAINLEVEL_OUTOF10: NO PAIN (0)
